# Patient Record
Sex: FEMALE | Race: WHITE | NOT HISPANIC OR LATINO | Employment: OTHER | URBAN - METROPOLITAN AREA
[De-identification: names, ages, dates, MRNs, and addresses within clinical notes are randomized per-mention and may not be internally consistent; named-entity substitution may affect disease eponyms.]

---

## 2017-01-05 ENCOUNTER — TRANSCRIBE ORDERS (OUTPATIENT)
Dept: ADMINISTRATIVE | Facility: HOSPITAL | Age: 82
End: 2017-01-05

## 2017-01-05 DIAGNOSIS — R05.9 COUGH: Primary | ICD-10-CM

## 2017-01-10 ENCOUNTER — HOSPITAL ENCOUNTER (OUTPATIENT)
Dept: CT IMAGING | Facility: HOSPITAL | Age: 82
Discharge: HOME | End: 2017-01-10
Attending: INTERNAL MEDICINE | Admitting: INTERNAL MEDICINE

## 2017-01-10 DIAGNOSIS — R05.9 COUGH: ICD-10-CM

## 2017-01-10 PROCEDURE — 71250 CT THORAX DX C-: CPT

## 2017-03-31 ENCOUNTER — APPOINTMENT (OUTPATIENT)
Dept: WOMENS IMAGING | Facility: HOSPITAL | Age: 82
End: 2017-03-31

## 2017-03-31 PROCEDURE — G0202 SCR MAMMO BI INCL CAD: HCPCS | Performed by: RADIOLOGY

## 2017-03-31 PROCEDURE — 77063 BREAST TOMOSYNTHESIS BI: CPT | Performed by: RADIOLOGY

## 2017-04-20 ENCOUNTER — OFFICE VISIT (OUTPATIENT)
Dept: ONCOLOGY | Facility: CLINIC | Age: 82
End: 2017-04-20
Attending: INTERNAL MEDICINE

## 2017-04-20 ENCOUNTER — LAB (OUTPATIENT)
Dept: LAB | Facility: HOSPITAL | Age: 82
End: 2017-04-20
Attending: INTERNAL MEDICINE

## 2017-04-20 VITALS
HEIGHT: 64 IN | DIASTOLIC BLOOD PRESSURE: 70 MMHG | BODY MASS INDEX: 28.85 KG/M2 | RESPIRATION RATE: 16 BRPM | WEIGHT: 169 LBS | SYSTOLIC BLOOD PRESSURE: 122 MMHG | HEART RATE: 76 BPM | TEMPERATURE: 98.6 F

## 2017-04-20 DIAGNOSIS — C91.10 CLL (CHRONIC LYMPHOCYTIC LEUKEMIA) (HCC): Primary | ICD-10-CM

## 2017-04-20 LAB
BASOPHILS # BLD AUTO: 0.05 10*3/MM3 (ref 0–0.1)
BASOPHILS NFR BLD AUTO: 0.3 % (ref 0–1.1)
DEPRECATED RDW RBC AUTO: 49.7 FL (ref 37–49)
EOSINOPHIL # BLD AUTO: 0.15 10*3/MM3 (ref 0–0.36)
EOSINOPHIL NFR BLD AUTO: 1 % (ref 1–5)
ERYTHROCYTE [DISTWIDTH] IN BLOOD BY AUTOMATED COUNT: 14.3 % (ref 11.7–14.5)
HCT VFR BLD AUTO: 39.5 % (ref 34–45)
HGB BLD-MCNC: 12.6 G/DL (ref 11.5–14.9)
IMM GRANULOCYTES # BLD: 0.07 10*3/MM3 (ref 0–0.03)
IMM GRANULOCYTES NFR BLD: 0.4 % (ref 0–0.5)
LYMPHOCYTES # BLD AUTO: 7.55 10*3/MM3 (ref 1–3.5)
LYMPHOCYTES NFR BLD AUTO: 48.4 % (ref 20–49)
MCH RBC QN AUTO: 30.1 PG (ref 27–33)
MCHC RBC AUTO-ENTMCNC: 31.9 G/DL (ref 32–35)
MCV RBC AUTO: 94.5 FL (ref 83–97)
MONOCYTES # BLD AUTO: 1.34 10*3/MM3 (ref 0.25–0.8)
MONOCYTES NFR BLD AUTO: 8.6 % (ref 4–12)
NEUTROPHILS # BLD AUTO: 6.43 10*3/MM3 (ref 1.5–7)
NEUTROPHILS NFR BLD AUTO: 41.3 % (ref 39–75)
NRBC BLD MANUAL-RTO: 0 /100 WBC (ref 0–0)
PLATELET # BLD AUTO: 224 10*3/MM3 (ref 150–375)
PMV BLD AUTO: 10.4 FL (ref 8.9–12.1)
RBC # BLD AUTO: 4.18 10*6/MM3 (ref 3.9–5)
WBC NRBC COR # BLD: 15.59 10*3/MM3 (ref 4–10)

## 2017-04-20 PROCEDURE — 85025 COMPLETE CBC W/AUTO DIFF WBC: CPT | Performed by: INTERNAL MEDICINE

## 2017-04-20 PROCEDURE — 36415 COLL VENOUS BLD VENIPUNCTURE: CPT | Performed by: INTERNAL MEDICINE

## 2017-04-20 PROCEDURE — 99214 OFFICE O/P EST MOD 30 MIN: CPT | Performed by: INTERNAL MEDICINE

## 2017-04-20 NOTE — PROGRESS NOTES
Subjective .     REASON FOR FOLLOWUP:  New diagnosis of CLL.                             HISTORY OF PRESENT ILLNESS:  The patient is a 83 y.o. year old female  who is here for follow-up with the above-mentioned history.  Denies fever or chills.  Denies unintentional significant weight loss    Denies lymphadenopathy.    Has had 10 or so episodes over the past month of systems sweating only on her chest at night.  These do not sound like drenching night sweats.  But they are new for her.    Past Medical History:   Diagnosis Date   • Asthma    • CKD (chronic kidney disease)    • CLL (chronic lymphocytic leukemia)     Stage 0, trisomy 12   • GERD (gastroesophageal reflux disease)    • H/O Frequent PACs and PVCs    • H/O Lymphocytosis    • H/O Transient amnesia    • HTN (hypertension)    • Hyperlipidemia    • Hypothyroid    • Lumbar stenosis    • Osteopenia    • Pain     H/O diffuse chest, epigastric and lower abdominal pain   • Ulcerative colitis        HEMATOLOGIC/ONCOLOGIC HISTORY:  (History from previous dates can be found in the separate document.)    MEDICATIONS    Current Outpatient Prescriptions:   •  Albuterol (PROVENTIL IN), Inhale As Needed., Disp: , Rfl:   •  amLODIPine (NORVASC) 2.5 MG tablet, Take 2.5 mg by mouth daily., Disp: , Rfl:   •  amLODIPine-valsartan (EXFORGE) 5-320 MG per tablet, Take 0.5 tablets by mouth Daily., Disp: , Rfl:   •  BABY ASPIRIN PO, Take 81 mg by mouth daily., Disp: , Rfl:   •  celecoxib (CELEBREX) 200 MG capsule, Take  by mouth Daily., Disp: , Rfl:   •  Cholecalciferol (VITAMIN D-3 PO), Take 2,000 Units by mouth daily., Disp: , Rfl:   •  dextromethorphan-guaifenesin (ROBITUSSIN-DM)  MG/5ML syrup, Take 5 mL by mouth Every 4 (Four) Hours As Needed., Disp: , Rfl:   •  esomeprazole (NEXIUM) 40 MG capsule, Take  by mouth Daily., Disp: , Rfl:   •  folic acid (FOLVITE) 1 MG tablet, Take 1 mg by mouth daily., Disp: , Rfl:   •  levothyroxine (SYNTHROID, LEVOTHROID) 50 MCG tablet,  Take 50 mcg by mouth daily., Disp: , Rfl:   •  Loratadine 10 MG capsule, Take 10 mg by mouth daily., Disp: , Rfl:   •  meloxicam (MOBIC) 15 MG tablet, Take 15 mg by mouth daily., Disp: , Rfl:   •  metoprolol succinate XL (TOPROL-XL) 25 MG 24 hr tablet, TAKE ONE TABLET BY MOUTH ONE TIME DAILY , Disp: 30 tablet, Rfl: 0  •  Multiple Vitamins-Minerals (MULTI COMPLETE) capsule, Take  by mouth Daily., Disp: , Rfl:   •  omeprazole (PriLOSEC) 20 MG capsule, Take 20 mg by mouth daily., Disp: , Rfl:   •  rosuvastatin (CRESTOR) 20 MG tablet, Take  by mouth Daily., Disp: , Rfl:   •  sulfaSALAzine (AZULFIDINE) 500 MG tablet, Take 500 mg by mouth 4 (four) times a day., Disp: , Rfl:   •  valsartan (DIOVAN) 320 MG tablet, Take 320 mg by mouth daily., Disp: , Rfl:   •  zolpidem (AMBIEN) 5 MG tablet, Take  by mouth., Disp: , Rfl:     ALLERGIES:     Allergies   Allergen Reactions   • Penicillins        SOCIAL HISTORY:       Social History     Social History   • Marital status:      Spouse name: N/A   • Number of children: N/A   • Years of education: N/A     Occupational History   •  Retired     Social History Main Topics   • Smoking status: Former Smoker     Packs/day: 1.50     Years: 40.00     Types: Cigarettes     Quit date: 1978   • Smokeless tobacco: Former User   • Alcohol use 4.2 oz/week     7 Glasses of wine per week      Comment: 1 drink daily   • Drug use: Not on file   • Sexual activity: Not on file     Other Topics Concern   • Not on file     Social History Narrative    housewife         FAMILY HISTORY:  Cancer-related family history is not on file.    REVIEW OF SYSTEMS:  GENERAL: No change in appetite or weight;   No fevers, chills.    SKIN: No nonhealing lesions.   No rashes.  HEME/LYMPH: No easy bruising, bleeding.   No swollen nodes.   EYES: No vision changes or diplopia.   ENT: No tinnitus, hearing loss, gum bleeding, epistaxis, hoarseness or dysphagia.   RESPIRATORY: No cough, shortness of breath, hemoptysis  "or wheezing.   CVS: No chest pain, palpitations, orthopnea, dyspnea on exertion or PND.   GI: No melena or hematochezia.   No abdominal pain.  No nausea, vomiting, constipation, diarrhea  : No lower tract obstructive symptoms, dysuria or hematuria.   MUSCULOSKELETAL: No bone pain.  No joint stiffness.   NEUROLOGICAL: No global weakness, loss of consciousness or seizures.   PSYCHIATRIC: No increased nervousness, mood changes or depression.        Objective    Vitals:    04/20/17 1024   BP: 122/70   Pulse: 76   Resp: 16   Temp: 98.6 °F (37 °C)   Weight: 169 lb (76.7 kg)   Height: 63.78\" (162 cm)  Comment: new ht.   PainSc: 0-No pain     Current Status 4/20/2017   ECOG score 0      PHYSICAL EXAM:    GENERAL:  Well-developed, well-nourished in no acute distress.   SKIN:  Warm, dry without rashes, purpura or petechiae.  HEAD:  Normocephalic.  EYES:  Pupils equal, round and reactive to light.  EOMs intact.  Conjunctivae normal.  EARS:  Hearing intact.  NOSE:  Septum midline.  No excoriations or nasal discharge.  MOUTH:  Tongue is well-papillated; no stomatitis or ulcers.  Lips normal.  THROAT:  Oropharynx without lesions or exudates.  NECK:  Supple with good range of motion; no thyromegaly or masses, no JVD.  LYMPHATICS:  No cervical, supraclavicular, axillary or inguinal adenopathy.  CHEST:  Lungs clear to percussion and auscultation. Good airflow.  CARDIAC:  Regular rate and rhythm without murmurs, rubs or gallops. Normal S1,S2.  ABDOMEN:  Soft, nontender with no organomegaly or masses.  EXTREMITIES:  No clubbing, cyanosis or edema.  NEUROLOGICAL:  Cranial Nerves II-XII grossly intact.  No focal neurological deficits.  PSYCHIATRIC:  Normal affect and mood.      RECENT LABS:        WBC   Date/Time Value Ref Range Status   04/20/2017 10:17 AM 15.59 (H) 4.00 - 10.00 10*3/mm3 Final     Hemoglobin   Date/Time Value Ref Range Status   04/20/2017 10:17 AM 12.6 11.5 - 14.9 g/dL Final     Platelets   Date/Time Value Ref Range " Status   04/20/2017 10:17  150 - 375 10*3/mm3 Final       Assessment/Plan     ASSESSMENT:  CLL (chronic lymphocytic leukemia)  - CBC & Differential      1.  CLL, stage 0, trisomy 12 (prognostic significance not clear).  Has not required any treatment.  White blood count 9 in 2012, 11 2013, 15 in 2014. 13-15 2016.  Numbers remain stable.  No indication to begin treatment.    2.  Lymphocytosis.  Due to CLL.  Stable.  Monitor.    3.  Night sweats occurring 10 times or so over the past month.  Only on her chest.  These do not sound like drenching night sweats.  Numbers are stable.  No lymphadenopathy or hepatosplenomegaly on exam.  She feels good.  No indication to begin treatment.  Maintain same follow-up.  The night sweats were new issue for me to address today.      PLAN:  1.  M.D. CBC 6 months  (I offered annual follow-up but she prefers 6 months)

## 2017-10-20 ENCOUNTER — APPOINTMENT (OUTPATIENT)
Dept: LAB | Facility: HOSPITAL | Age: 82
End: 2017-10-20

## 2017-10-20 ENCOUNTER — APPOINTMENT (OUTPATIENT)
Dept: ONCOLOGY | Facility: CLINIC | Age: 82
End: 2017-10-20

## 2017-11-03 ENCOUNTER — LAB (OUTPATIENT)
Dept: LAB | Facility: HOSPITAL | Age: 82
End: 2017-11-03

## 2017-11-03 ENCOUNTER — OFFICE VISIT (OUTPATIENT)
Dept: ONCOLOGY | Facility: CLINIC | Age: 82
End: 2017-11-03

## 2017-11-03 VITALS
TEMPERATURE: 98.1 F | SYSTOLIC BLOOD PRESSURE: 120 MMHG | DIASTOLIC BLOOD PRESSURE: 70 MMHG | BODY MASS INDEX: 28.17 KG/M2 | WEIGHT: 165 LBS | RESPIRATION RATE: 16 BRPM | HEART RATE: 76 BPM | HEIGHT: 64 IN

## 2017-11-03 DIAGNOSIS — C91.10 CLL (CHRONIC LYMPHOCYTIC LEUKEMIA) (HCC): ICD-10-CM

## 2017-11-03 DIAGNOSIS — C91.10 CLL (CHRONIC LYMPHOCYTIC LEUKEMIA) (HCC): Primary | ICD-10-CM

## 2017-11-03 LAB
BASOPHILS # BLD AUTO: 0.04 10*3/MM3 (ref 0–0.1)
BASOPHILS NFR BLD AUTO: 0.3 % (ref 0–1.1)
DEPRECATED RDW RBC AUTO: 47 FL (ref 37–49)
EOSINOPHIL # BLD AUTO: 0.16 10*3/MM3 (ref 0–0.36)
EOSINOPHIL NFR BLD AUTO: 1 % (ref 1–5)
ERYTHROCYTE [DISTWIDTH] IN BLOOD BY AUTOMATED COUNT: 13.1 % (ref 11.7–14.5)
HCT VFR BLD AUTO: 39.2 % (ref 34–45)
HGB BLD-MCNC: 12.8 G/DL (ref 11.5–14.9)
IMM GRANULOCYTES # BLD: 0.06 10*3/MM3 (ref 0–0.03)
IMM GRANULOCYTES NFR BLD: 0.4 % (ref 0–0.5)
LYMPHOCYTES # BLD AUTO: 8.98 10*3/MM3 (ref 1–3.5)
LYMPHOCYTES NFR BLD AUTO: 56.9 % (ref 20–49)
MCH RBC QN AUTO: 31.4 PG (ref 27–33)
MCHC RBC AUTO-ENTMCNC: 32.7 G/DL (ref 32–35)
MCV RBC AUTO: 96.3 FL (ref 83–97)
MONOCYTES # BLD AUTO: 1.2 10*3/MM3 (ref 0.25–0.8)
MONOCYTES NFR BLD AUTO: 7.6 % (ref 4–12)
NEUTROPHILS # BLD AUTO: 5.35 10*3/MM3 (ref 1.5–7)
NEUTROPHILS NFR BLD AUTO: 33.8 % (ref 39–75)
NRBC BLD MANUAL-RTO: 0 /100 WBC (ref 0–0)
PLATELET # BLD AUTO: 220 10*3/MM3 (ref 150–375)
PMV BLD AUTO: 10.6 FL (ref 8.9–12.1)
RBC # BLD AUTO: 4.07 10*6/MM3 (ref 3.9–5)
WBC NRBC COR # BLD: 15.79 10*3/MM3 (ref 4–10)

## 2017-11-03 PROCEDURE — 99214 OFFICE O/P EST MOD 30 MIN: CPT | Performed by: INTERNAL MEDICINE

## 2017-11-03 PROCEDURE — 85025 COMPLETE CBC W/AUTO DIFF WBC: CPT | Performed by: INTERNAL MEDICINE

## 2017-11-03 PROCEDURE — 36416 COLLJ CAPILLARY BLOOD SPEC: CPT | Performed by: INTERNAL MEDICINE

## 2017-11-03 NOTE — PROGRESS NOTES
Subjective .     REASON FOR FOLLOWUP:  CLL.                             HISTORY OF PRESENT ILLNESS:  The patient is a 83 y.o. year old female  who is here for follow-up with the above-mentioned history.    Denies fevers or chills or weight loss.  Has intermittent night sweats, but these are not drenching.  He was having these most nights in August, but none recently.    No infection since last visit.  She has not found any lymphadenopathy.      Past Medical History:   Diagnosis Date   • Arrhythmia    • Arthritis    • Asthma    • CKD (chronic kidney disease)    • CLL (chronic lymphocytic leukemia)     Stage 0, trisomy 12   • GERD (gastroesophageal reflux disease)    • H/O Frequent PACs and PVCs    • H/O Lymphocytosis    • H/O Transient amnesia    • HTN (hypertension)    • Hyperlipidemia    • Hypothyroid    • Lumbar stenosis    • Neuropathy     Legs   • Osteopenia    • Pain     H/O diffuse chest, epigastric and lower abdominal pain   • Seasonal allergies    • Ulcerative colitis        HEMATOLOGIC/ONCOLOGIC HISTORY:  (History from previous dates can be found in the separate document.)    MEDICATIONS    Current Outpatient Prescriptions:   •  Albuterol (PROVENTIL IN), Inhale As Needed., Disp: , Rfl:   •  amLODIPine (NORVASC) 2.5 MG tablet, Take 2.5 mg by mouth daily., Disp: , Rfl:   •  amLODIPine-valsartan (EXFORGE) 5-320 MG per tablet, Take 0.5 tablets by mouth Daily., Disp: , Rfl:   •  BABY ASPIRIN PO, Take 81 mg by mouth daily., Disp: , Rfl:   •  celecoxib (CELEBREX) 200 MG capsule, Take  by mouth Daily., Disp: , Rfl:   •  Cholecalciferol (VITAMIN D-3 PO), Take 2,000 Units by mouth daily., Disp: , Rfl:   •  dextromethorphan-guaifenesin (ROBITUSSIN-DM)  MG/5ML syrup, Take 5 mL by mouth Every 4 (Four) Hours As Needed., Disp: , Rfl:   •  esomeprazole (NEXIUM) 40 MG capsule, Take  by mouth Daily., Disp: , Rfl:   •  folic acid (FOLVITE) 1 MG tablet, Take 1 mg by mouth daily., Disp: , Rfl:   •  levothyroxine  (SYNTHROID, LEVOTHROID) 50 MCG tablet, Take 50 mcg by mouth daily., Disp: , Rfl:   •  Loratadine 10 MG capsule, Take 10 mg by mouth daily., Disp: , Rfl:   •  meloxicam (MOBIC) 15 MG tablet, Take 15 mg by mouth daily., Disp: , Rfl:   •  metoprolol succinate XL (TOPROL-XL) 25 MG 24 hr tablet, TAKE ONE TABLET BY MOUTH ONE TIME DAILY , Disp: 30 tablet, Rfl: 0  •  Multiple Vitamins-Minerals (MULTI COMPLETE) capsule, Take  by mouth Daily., Disp: , Rfl:   •  omeprazole (PriLOSEC) 20 MG capsule, Take 20 mg by mouth daily., Disp: , Rfl:   •  rosuvastatin (CRESTOR) 20 MG tablet, Take  by mouth Daily., Disp: , Rfl:   •  sulfaSALAzine (AZULFIDINE) 500 MG tablet, Take 500 mg by mouth 4 (four) times a day., Disp: , Rfl:   •  valsartan (DIOVAN) 320 MG tablet, Take 320 mg by mouth daily., Disp: , Rfl:   •  zolpidem (AMBIEN) 5 MG tablet, Take  by mouth., Disp: , Rfl:     ALLERGIES:     Allergies   Allergen Reactions   • Penicillins        SOCIAL HISTORY:       Social History     Social History   • Marital status:      Spouse name: N/A   • Number of children: N/A   • Years of education: N/A     Occupational History   •  Retired     Social History Main Topics   • Smoking status: Former Smoker     Packs/day: 1.50     Years: 40.00     Types: Cigarettes     Quit date: 1978   • Smokeless tobacco: Former User   • Alcohol use 4.2 oz/week     7 Glasses of wine per week      Comment: 1 drink daily   • Drug use: Not on file   • Sexual activity: Not on file     Other Topics Concern   • Not on file     Social History Narrative    housewife         FAMILY HISTORY:  Cancer-related family history includes Colon cancer in her mother.    REVIEW OF SYSTEMS:  GENERAL: No change in appetite or weight;   No fevers, chills.    SKIN: No nonhealing lesions.   No rashes.  HEME/LYMPH: No easy bruising, bleeding.   No swollen nodes.   EYES: No vision changes or diplopia.   ENT: No tinnitus, hearing loss, gum bleeding, epistaxis, hoarseness or  "dysphagia.   RESPIRATORY: No cough, shortness of breath, hemoptysis or wheezing.   CVS: No chest pain, palpitations, orthopnea, dyspnea on exertion or PND.   GI: No melena or hematochezia.   No abdominal pain.  No nausea, vomiting, constipation, diarrhea  : No lower tract obstructive symptoms, dysuria or hematuria.   MUSCULOSKELETAL: No bone pain.  No joint stiffness.   NEUROLOGICAL: No global weakness, loss of consciousness or seizures.   PSYCHIATRIC: No increased nervousness, mood changes or depression.        Objective    Vitals:    11/03/17 0852   BP: 120/70   Pulse: 76   Resp: 16   Temp: 98.1 °F (36.7 °C)   Weight: 165 lb (74.8 kg)   Height: 63.78\" (162 cm)  Comment: new ht.   PainSc: 0-No pain     Current Status 11/3/2017   ECOG score 0      PHYSICAL EXAM:    GENERAL:  Well-developed, well-nourished in no acute distress.   SKIN:  Warm, dry without rashes, purpura or petechiae.  HEAD:  Normocephalic.  EYES:  Pupils equal, round and reactive to light.  EOMs intact.  Conjunctivae normal.  EARS:  Hearing intact.  NOSE:  Septum midline.  No excoriations or nasal discharge.  MOUTH:  Tongue is well-papillated; no stomatitis or ulcers.  Lips normal.  THROAT:  Oropharynx without lesions or exudates.  NECK:  Supple with good range of motion; no thyromegaly or masses, no JVD.  LYMPHATICS:  No cervical, supraclavicular, axillary or inguinal adenopathy.  CHEST:  Lungs clear to percussion and auscultation. Good airflow.  CARDIAC:  Regular rate and rhythm without murmurs, rubs or gallops. Normal S1,S2.  ABDOMEN:  Soft, nontender with no organomegaly or masses.  EXTREMITIES:  No clubbing, cyanosis or edema.  NEUROLOGICAL:  Cranial Nerves II-XII grossly intact.  No focal neurological deficits.  PSYCHIATRIC:  Normal affect and mood.      RECENT LABS:        WBC   Date/Time Value Ref Range Status   11/03/2017 08:45 AM 15.79 (H) 4.00 - 10.00 10*3/mm3 Final     Hemoglobin   Date/Time Value Ref Range Status   11/03/2017 08:45 AM " 12.8 11.5 - 14.9 g/dL Final     Platelets   Date/Time Value Ref Range Status   11/03/2017 08:45  150 - 375 10*3/mm3 Final       Assessment/Plan     ASSESSMENT:  CLL (chronic lymphocytic leukemia)  - CBC & Differential    1.  CLL, stage 0, trisomy 12 (prognostic significance not clear).  Has not required any treatment.  White blood count 9 in 2012, 11 2013, 15 in 2014. 13-15 2016.  Numbers remain stable.  No indication to begin treatment.  WBC stable at 15.8.    2.  Lymphocytosis.  Due to CLL.  Stable.  Monitor.    3.  Non-drenching, intermittent, Night sweats.  This does not appear to be due to CLL since everything else looks stable/asymptomatic.     PLAN:  1.  M.D. CBC 6 months  (I offered annual follow-up but she prefers 6 months)

## 2018-04-17 ENCOUNTER — LAB (OUTPATIENT)
Dept: LAB | Facility: HOSPITAL | Age: 83
End: 2018-04-17

## 2018-04-17 ENCOUNTER — OFFICE VISIT (OUTPATIENT)
Dept: ONCOLOGY | Facility: CLINIC | Age: 83
End: 2018-04-17

## 2018-04-17 VITALS
TEMPERATURE: 98.2 F | BODY MASS INDEX: 28.92 KG/M2 | DIASTOLIC BLOOD PRESSURE: 70 MMHG | SYSTOLIC BLOOD PRESSURE: 120 MMHG | OXYGEN SATURATION: 96 % | HEART RATE: 75 BPM | WEIGHT: 169.4 LBS | RESPIRATION RATE: 12 BRPM | HEIGHT: 64 IN

## 2018-04-17 DIAGNOSIS — C91.10 CLL (CHRONIC LYMPHOCYTIC LEUKEMIA) (HCC): Primary | ICD-10-CM

## 2018-04-17 DIAGNOSIS — C91.10 CLL (CHRONIC LYMPHOCYTIC LEUKEMIA) (HCC): ICD-10-CM

## 2018-04-17 LAB
BASOPHILS # BLD AUTO: 0.05 10*3/MM3 (ref 0–0.1)
BASOPHILS NFR BLD AUTO: 0.2 % (ref 0–1.1)
DEPRECATED RDW RBC AUTO: 44.3 FL (ref 37–49)
EOSINOPHIL # BLD AUTO: 0.09 10*3/MM3 (ref 0–0.36)
EOSINOPHIL NFR BLD AUTO: 0.4 % (ref 1–5)
ERYTHROCYTE [DISTWIDTH] IN BLOOD BY AUTOMATED COUNT: 13 % (ref 11.7–14.5)
HCT VFR BLD AUTO: 38.6 % (ref 34–45)
HGB BLD-MCNC: 12.5 G/DL (ref 11.5–14.9)
IMM GRANULOCYTES # BLD: 0.18 10*3/MM3 (ref 0–0.03)
IMM GRANULOCYTES NFR BLD: 0.8 % (ref 0–0.5)
LYMPHOCYTES # BLD AUTO: 10.95 10*3/MM3 (ref 1–3.5)
LYMPHOCYTES NFR BLD AUTO: 51 % (ref 20–49)
MCH RBC QN AUTO: 30.4 PG (ref 27–33)
MCHC RBC AUTO-ENTMCNC: 32.4 G/DL (ref 32–35)
MCV RBC AUTO: 93.9 FL (ref 83–97)
MONOCYTES # BLD AUTO: 1.92 10*3/MM3 (ref 0.25–0.8)
MONOCYTES NFR BLD AUTO: 8.9 % (ref 4–12)
NEUTROPHILS # BLD AUTO: 8.27 10*3/MM3 (ref 1.5–7)
NEUTROPHILS NFR BLD AUTO: 38.7 % (ref 39–75)
NRBC BLD MANUAL-RTO: 0 /100 WBC (ref 0–0)
PLATELET # BLD AUTO: 249 10*3/MM3 (ref 150–375)
PMV BLD AUTO: 10.5 FL (ref 8.9–12.1)
RBC # BLD AUTO: 4.11 10*6/MM3 (ref 3.9–5)
WBC NRBC COR # BLD: 21.46 10*3/MM3 (ref 4–10)

## 2018-04-17 PROCEDURE — 99213 OFFICE O/P EST LOW 20 MIN: CPT | Performed by: INTERNAL MEDICINE

## 2018-04-17 PROCEDURE — 36416 COLLJ CAPILLARY BLOOD SPEC: CPT | Performed by: INTERNAL MEDICINE

## 2018-04-17 PROCEDURE — 85025 COMPLETE CBC W/AUTO DIFF WBC: CPT | Performed by: INTERNAL MEDICINE

## 2018-04-17 NOTE — PROGRESS NOTES
Subjective .     REASON FOR FOLLOWUP:  CLL.                             HISTORY OF PRESENT ILLNESS:  The patient is a 84 y.o. year old female  who is here for follow-up with the above-mentioned history.    Denies fever or chills.  Denies unintentional significant weight loss  Denies drenching night sweats.     Yesterday, developed swelling of the second digit/knuckles right hand.  Painful.  It difficult to sleep.  Try to see her PCP but was unable to do this so went to urgent care.  Urgent care gave her an antibiotic and set her up to see a hand surgeon tomorrow.    Past Medical History:   Diagnosis Date   • Arrhythmia    • Arthritis    • Asthma    • CKD (chronic kidney disease)    • CLL (chronic lymphocytic leukemia)     Stage 0, trisomy 12   • GERD (gastroesophageal reflux disease)    • H/O Frequent PACs and PVCs    • H/O Lymphocytosis    • H/O Transient amnesia    • HTN (hypertension)    • Hyperlipidemia    • Hypothyroid    • Lumbar stenosis    • Neuropathy     Legs   • Osteopenia    • Pain     H/O diffuse chest, epigastric and lower abdominal pain   • Seasonal allergies    • Ulcerative colitis        HEMATOLOGIC/ONCOLOGIC HISTORY:  (History from previous dates can be found in the separate document.)    MEDICATIONS    Current Outpatient Prescriptions:   •  Albuterol (PROVENTIL IN), Inhale As Needed., Disp: , Rfl:   •  amLODIPine (NORVASC) 2.5 MG tablet, Take 2.5 mg by mouth daily., Disp: , Rfl:   •  amLODIPine-valsartan (EXFORGE) 5-320 MG per tablet, Take 0.5 tablets by mouth Daily., Disp: , Rfl:   •  BABY ASPIRIN PO, Take 81 mg by mouth daily., Disp: , Rfl:   •  celecoxib (CELEBREX) 200 MG capsule, Take  by mouth Daily., Disp: , Rfl:   •  Cholecalciferol (VITAMIN D-3 PO), Take 2,000 Units by mouth daily., Disp: , Rfl:   •  clindamycin (CLEOCIN) 300 MG capsule, Take 1 capsule by mouth 3 (Three) Times a Day., Disp: 30 capsule, Rfl: 0  •  dextromethorphan-guaifenesin (ROBITUSSIN-DM)  MG/5ML syrup, Take 5 mL  by mouth Every 4 (Four) Hours As Needed., Disp: , Rfl:   •  esomeprazole (NEXIUM) 40 MG capsule, Take  by mouth Daily., Disp: , Rfl:   •  folic acid (FOLVITE) 1 MG tablet, Take 1 mg by mouth daily., Disp: , Rfl:   •  levothyroxine (SYNTHROID, LEVOTHROID) 50 MCG tablet, Take 50 mcg by mouth daily., Disp: , Rfl:   •  Loratadine 10 MG capsule, Take 10 mg by mouth daily., Disp: , Rfl:   •  meloxicam (MOBIC) 15 MG tablet, Take 15 mg by mouth daily., Disp: , Rfl:   •  metoprolol succinate XL (TOPROL-XL) 25 MG 24 hr tablet, TAKE ONE TABLET BY MOUTH ONE TIME DAILY , Disp: 30 tablet, Rfl: 0  •  Multiple Vitamins-Minerals (MULTI COMPLETE) capsule, Take  by mouth Daily., Disp: , Rfl:   •  omeprazole (PriLOSEC) 20 MG capsule, Take 20 mg by mouth daily., Disp: , Rfl:   •  rosuvastatin (CRESTOR) 20 MG tablet, Take  by mouth Daily., Disp: , Rfl:   •  sulfaSALAzine (AZULFIDINE) 500 MG tablet, Take 500 mg by mouth 4 (four) times a day., Disp: , Rfl:   •  valsartan (DIOVAN) 320 MG tablet, Take 320 mg by mouth daily., Disp: , Rfl:   •  zolpidem (AMBIEN) 5 MG tablet, Take  by mouth., Disp: , Rfl:     ALLERGIES:     Allergies   Allergen Reactions   • Penicillins Anaphylaxis       SOCIAL HISTORY:       Social History     Social History   • Marital status:      Spouse name: N/A   • Number of children: N/A   • Years of education: N/A     Occupational History   •  Retired     Social History Main Topics   • Smoking status: Former Smoker     Packs/day: 1.50     Years: 40.00     Types: Cigarettes     Quit date: 1978   • Smokeless tobacco: Former User   • Alcohol use 4.2 oz/week     7 Glasses of wine per week      Comment: 1 drink daily   • Drug use: Unknown   • Sexual activity: Not on file     Other Topics Concern   • Not on file     Social History Narrative    housewife         FAMILY HISTORY:  Cancer-related family history includes Colon cancer in her mother.    REVIEW OF SYSTEMS:  Review of Systems   Constitutional: Negative for  "activity change.   HENT: Negative for nosebleeds and trouble swallowing.    Respiratory: Negative for shortness of breath and wheezing.    Cardiovascular: Negative for chest pain and palpitations.   Gastrointestinal: Negative for constipation, diarrhea and nausea.   Genitourinary: Negative for dysuria and hematuria.   Musculoskeletal: Negative for arthralgias and myalgias.   Skin: Negative for rash and wound.   Neurological: Negative for seizures and syncope.   Hematological: Negative for adenopathy. Does not bruise/bleed easily.   Psychiatric/Behavioral: Negative for confusion.           Objective    Vitals:    04/17/18 1337   BP: 120/70   Pulse: 75   Resp: 12   Temp: 98.2 °F (36.8 °C)   SpO2: 96%  Comment: at rest   Weight: 76.8 kg (169 lb 6.4 oz)   Height: 162 cm (63.78\")  Comment: new ht.   PainSc: 5  Comment: rt. hand     Current Status 4/17/2018   ECOG score 1      PHYSICAL EXAM:    CONSTITUTIONAL:  Vital signs reviewed.  No distress, looks comfortable.  EYES:  Conjunctiva and lids unremarkable.  PERRLA  EARS,NOSE,MOUTH,THROAT:  Ears and nose appear unremarkable.  Lips, teeth, gums appear unremarkable.  RESPIRATORY:  Normal respiratory effort.  Lungs clear to auscultation bilaterally.  CARDIOVASCULAR:  Normal S1, S2.  No murmurs rubs or gallops.  No significant lower extremity edema.  GASTROINTESTINAL: Abdomen appears unremarkable.  Nontender.  No hepatomegaly.  No splenomegaly.  LYMPHATIC:  No cervical, supraclavicular, axillary lymphadenopathy.  MUSCULOSKELETAL:  Unremarkable gait and station.  Unremarkable digits/nails.  No cyanosis or clubbing.  SKIN:  Warm.  Right hand second digit warm swollen and red.  PSYCHIATRIC:  Normal judgment and insight.  Normal mood and affect.     RECENT LABS:        WBC   Date/Time Value Ref Range Status   04/17/2018 01:05 PM 21.46 (H) 4.00 - 10.00 10*3/mm3 Final     Hemoglobin   Date/Time Value Ref Range Status   04/17/2018 01:05 PM 12.5 11.5 - 14.9 g/dL Final     Platelets "   Date/Time Value Ref Range Status   04/17/2018 01:05  150 - 375 10*3/mm3 Final       Assessment/Plan     ASSESSMENT:  CLL (chronic lymphocytic leukemia)  - CBC & Differential  - CBC & Differential      *CLL, stage 0, trisomy 12 (prognostic significance not clear).  Has not required any treatment.  WBC 9 in 2012, 11 in 2013.   13--15 [id number].  WBC worse today at 21.5.  ANC not low.  Instead, high.  Suspect the elevation in WBC may be related to her hand swelling.  Hb and PLT remain normal.    *Leukocytosis, due to lymphocytosis.  WBC is worse today.  Also has neutrophilia today.  Suspect this is due to hand issues.    *Lymphocytosis.  Due to CLL.  Stable.  Monitor.    *Right second digit swelling/redness/warmth.  Was seen in urgent care yesterday and given an antibiotic and an appointment was made with a hand surgeon tomorrow.  I told her I suspect this is gout.  She is taking ibuprofen.    *Non-drenching, intermittent, Night sweats.  This does not appear to be due to CLL since everything else looks stable/asymptomatic.     PLAN:  1.  M.D. CBC 6 months  (I have previously offered annual follow-up but she prefers 6 months)

## 2018-07-17 ENCOUNTER — TRANSCRIBE ORDERS (OUTPATIENT)
Dept: ADMINISTRATIVE | Facility: HOSPITAL | Age: 83
End: 2018-07-17

## 2018-07-17 DIAGNOSIS — Z12.31 VISIT FOR SCREENING MAMMOGRAM: Primary | ICD-10-CM

## 2018-07-30 ENCOUNTER — APPOINTMENT (OUTPATIENT)
Dept: MAMMOGRAPHY | Facility: HOSPITAL | Age: 83
End: 2018-07-30
Attending: INTERNAL MEDICINE

## 2018-07-30 ENCOUNTER — APPOINTMENT (OUTPATIENT)
Dept: WOMENS IMAGING | Facility: HOSPITAL | Age: 83
End: 2018-07-30

## 2018-07-30 PROCEDURE — 77063 BREAST TOMOSYNTHESIS BI: CPT | Performed by: RADIOLOGY

## 2018-07-30 PROCEDURE — 77067 SCR MAMMO BI INCL CAD: CPT | Performed by: RADIOLOGY

## 2018-10-01 ENCOUNTER — LAB (OUTPATIENT)
Dept: LAB | Facility: HOSPITAL | Age: 83
End: 2018-10-01

## 2018-10-01 ENCOUNTER — OFFICE VISIT (OUTPATIENT)
Dept: ONCOLOGY | Facility: CLINIC | Age: 83
End: 2018-10-01

## 2018-10-01 VITALS
DIASTOLIC BLOOD PRESSURE: 82 MMHG | WEIGHT: 166.2 LBS | SYSTOLIC BLOOD PRESSURE: 132 MMHG | HEART RATE: 60 BPM | RESPIRATION RATE: 14 BRPM | BODY MASS INDEX: 28.38 KG/M2 | HEIGHT: 64 IN | TEMPERATURE: 98.3 F | OXYGEN SATURATION: 95 %

## 2018-10-01 DIAGNOSIS — C91.10 CLL (CHRONIC LYMPHOCYTIC LEUKEMIA) (HCC): ICD-10-CM

## 2018-10-01 DIAGNOSIS — C91.10 CLL (CHRONIC LYMPHOCYTIC LEUKEMIA) (HCC): Primary | ICD-10-CM

## 2018-10-01 LAB
BASOPHILS # BLD AUTO: 0.06 10*3/MM3 (ref 0–0.1)
BASOPHILS NFR BLD AUTO: 0.3 % (ref 0–1.1)
DEPRECATED RDW RBC AUTO: 44.5 FL (ref 37–49)
EOSINOPHIL # BLD AUTO: 0.19 10*3/MM3 (ref 0–0.36)
EOSINOPHIL NFR BLD AUTO: 1 % (ref 1–5)
ERYTHROCYTE [DISTWIDTH] IN BLOOD BY AUTOMATED COUNT: 12.9 % (ref 11.7–14.5)
HCT VFR BLD AUTO: 41.3 % (ref 34–45)
HGB BLD-MCNC: 13.5 G/DL (ref 11.5–14.9)
IMM GRANULOCYTES # BLD: 0.05 10*3/MM3 (ref 0–0.03)
IMM GRANULOCYTES NFR BLD: 0.3 % (ref 0–0.5)
LYMPHOCYTES # BLD AUTO: 11 10*3/MM3 (ref 1–3.5)
LYMPHOCYTES NFR BLD AUTO: 58.6 % (ref 20–49)
MCH RBC QN AUTO: 30.7 PG (ref 27–33)
MCHC RBC AUTO-ENTMCNC: 32.7 G/DL (ref 32–35)
MCV RBC AUTO: 93.9 FL (ref 83–97)
MONOCYTES # BLD AUTO: 1.52 10*3/MM3 (ref 0.25–0.8)
MONOCYTES NFR BLD AUTO: 8.1 % (ref 4–12)
NEUTROPHILS # BLD AUTO: 5.95 10*3/MM3 (ref 1.5–7)
NEUTROPHILS NFR BLD AUTO: 31.7 % (ref 39–75)
NRBC BLD MANUAL-RTO: 0 /100 WBC (ref 0–0)
PLATELET # BLD AUTO: 229 10*3/MM3 (ref 150–375)
PMV BLD AUTO: 10.7 FL (ref 8.9–12.1)
RBC # BLD AUTO: 4.4 10*6/MM3 (ref 3.9–5)
WBC NRBC COR # BLD: 18.77 10*3/MM3 (ref 4–10)

## 2018-10-01 PROCEDURE — 99214 OFFICE O/P EST MOD 30 MIN: CPT | Performed by: INTERNAL MEDICINE

## 2018-10-01 PROCEDURE — 36415 COLL VENOUS BLD VENIPUNCTURE: CPT | Performed by: INTERNAL MEDICINE

## 2018-10-01 PROCEDURE — 85025 COMPLETE CBC W/AUTO DIFF WBC: CPT | Performed by: INTERNAL MEDICINE

## 2018-10-01 NOTE — PROGRESS NOTES
Subjective .     REASON FOR FOLLOWUP:  CLL.                             HISTORY OF PRESENT ILLNESS:  The patient is a 84 y.o. year old female  who is here for follow-up with the above-mentioned history.    Denies fevers, chills, weight loss, night sweats, recurrent or unusual infections.    Past Medical History:   Diagnosis Date   • Arrhythmia    • Arthritis    • Asthma    • CKD (chronic kidney disease)    • CLL (chronic lymphocytic leukemia) (CMS/HCC)     Stage 0, trisomy 12   • GERD (gastroesophageal reflux disease)    • H/O Frequent PACs and PVCs    • H/O Lymphocytosis    • H/O Transient amnesia    • HTN (hypertension)    • Hyperlipidemia    • Hypothyroid    • Lumbar stenosis    • Neuropathy     Legs   • Osteopenia    • Pain     H/O diffuse chest, epigastric and lower abdominal pain   • Seasonal allergies    • Ulcerative colitis (CMS/HCC)        HEMATOLOGIC/ONCOLOGIC HISTORY:  (History from previous dates can be found in the separate document.)    MEDICATIONS    Current Outpatient Prescriptions:   •  Albuterol (PROVENTIL IN), Inhale As Needed., Disp: , Rfl:   •  amLODIPine (NORVASC) 2.5 MG tablet, Take 2.5 mg by mouth daily., Disp: , Rfl:   •  amLODIPine-valsartan (EXFORGE) 5-320 MG per tablet, Take 0.5 tablets by mouth Daily., Disp: , Rfl:   •  BABY ASPIRIN PO, Take 81 mg by mouth daily., Disp: , Rfl:   •  celecoxib (CELEBREX) 200 MG capsule, Take  by mouth Daily., Disp: , Rfl:   •  Cholecalciferol (VITAMIN D-3 PO), Take 2,000 Units by mouth daily., Disp: , Rfl:   •  clindamycin (CLEOCIN) 300 MG capsule, Take 1 capsule by mouth 3 (Three) Times a Day., Disp: 30 capsule, Rfl: 0  •  dextromethorphan-guaifenesin (ROBITUSSIN-DM)  MG/5ML syrup, Take 5 mL by mouth Every 4 (Four) Hours As Needed., Disp: , Rfl:   •  esomeprazole (NEXIUM) 40 MG capsule, Take  by mouth Daily., Disp: , Rfl:   •  folic acid (FOLVITE) 1 MG tablet, Take 1 mg by mouth daily., Disp: , Rfl:   •  levothyroxine (SYNTHROID, LEVOTHROID) 50  MCG tablet, Take 50 mcg by mouth daily., Disp: , Rfl:   •  Loratadine 10 MG capsule, Take 10 mg by mouth daily., Disp: , Rfl:   •  meloxicam (MOBIC) 15 MG tablet, Take 15 mg by mouth daily., Disp: , Rfl:   •  metoprolol succinate XL (TOPROL-XL) 25 MG 24 hr tablet, TAKE ONE TABLET BY MOUTH ONE TIME DAILY , Disp: 30 tablet, Rfl: 0  •  Multiple Vitamins-Minerals (MULTI COMPLETE) capsule, Take  by mouth Daily., Disp: , Rfl:   •  omeprazole (PriLOSEC) 20 MG capsule, Take 20 mg by mouth daily., Disp: , Rfl:   •  rosuvastatin (CRESTOR) 20 MG tablet, Take  by mouth Daily., Disp: , Rfl:   •  sulfaSALAzine (AZULFIDINE) 500 MG tablet, Take 500 mg by mouth 4 (four) times a day., Disp: , Rfl:   •  valsartan (DIOVAN) 320 MG tablet, Take 320 mg by mouth daily., Disp: , Rfl:   •  zolpidem (AMBIEN) 5 MG tablet, Take  by mouth., Disp: , Rfl:     ALLERGIES:     Allergies   Allergen Reactions   • Penicillins Anaphylaxis       SOCIAL HISTORY:       Social History     Social History   • Marital status:      Spouse name: N/A   • Number of children: N/A   • Years of education: N/A     Occupational History   •  Retired     Social History Main Topics   • Smoking status: Former Smoker     Packs/day: 1.50     Years: 40.00     Types: Cigarettes     Quit date: 1978   • Smokeless tobacco: Former User   • Alcohol use 4.2 oz/week     7 Glasses of wine per week      Comment: 1 drink daily   • Drug use: Unknown   • Sexual activity: Not on file     Other Topics Concern   • Not on file     Social History Narrative    housewife         FAMILY HISTORY:  Cancer-related family history includes Colon cancer in her mother.    REVIEW OF SYSTEMS:  Review of Systems   Constitutional: Negative for activity change.   HENT: Negative for nosebleeds and trouble swallowing.    Respiratory: Negative for shortness of breath and wheezing.    Cardiovascular: Negative for chest pain and palpitations.   Gastrointestinal: Negative for constipation, diarrhea and  "nausea.   Genitourinary: Negative for dysuria and hematuria.   Musculoskeletal: Negative for arthralgias and myalgias.   Skin: Negative for rash and wound.   Neurological: Negative for seizures and syncope.   Hematological: Negative for adenopathy. Does not bruise/bleed easily.   Psychiatric/Behavioral: Negative for confusion.           Objective    Vitals:    10/01/18 1133   BP: 132/82   Pulse: 60   Resp: 14   Temp: 98.3 °F (36.8 °C)   SpO2: 95%  Comment: at rest   Weight: 75.4 kg (166 lb 3.2 oz)   Height: 163 cm (64.17\")  Comment: new ht.   PainSc: 0-No pain     Current Status 10/1/2018   ECOG score 0      PHYSICAL EXAM:    CONSTITUTIONAL:  Vital signs reviewed.  No distress, looks comfortable.  EYES:  Conjunctiva and lids unremarkable.  PERRLA  EARS,NOSE,MOUTH,THROAT:  Ears and nose appear unremarkable.  Lips, teeth, gums appear unremarkable.  RESPIRATORY:  Normal respiratory effort.  Lungs clear to auscultation bilaterally.  CARDIOVASCULAR:  Normal S1, S2.  No murmurs rubs or gallops.  No significant lower extremity edema.  GASTROINTESTINAL: Abdomen appears unremarkable.  Nontender.  No hepatomegaly.  No splenomegaly.  LYMPHATIC:  No cervical, supraclavicular, axillary lymphadenopathy.  SKIN:  Warm.  No rashes.  PSYCHIATRIC:  Normal judgment and insight.  Normal mood and affect.    RECENT LABS:        WBC   Date/Time Value Ref Range Status   10/01/2018 11:18 AM 18.77 (H) 4.00 - 10.00 10*3/mm3 Final     Hemoglobin   Date/Time Value Ref Range Status   10/01/2018 11:18 AM 13.5 11.5 - 14.9 g/dL Final     Platelets   Date/Time Value Ref Range Status   10/01/2018 11:18  150 - 375 10*3/mm3 Final       Assessment/Plan     ASSESSMENT:  CLL (chronic lymphocytic leukemia) (CMS/[facility])  - CBC & Differential      *CLL, stage 0, trisomy 12 (prognostic significance not clear).  Has not required any treatment.  WBC 9 in 2012, 11 in 2013.   13--15 [id number].  WBC relatively stable at 18.8.  Hb and PLT remain " normal.    *Leukocytosis, due to lymphocytosis.    *Lymphocytosis.  Due to CLL.  Stable.  Monitor.    *Non-drenching, intermittent, Night sweats.  This does not appear to be due to CLL since everything else looks stable/asymptomatic.   States these non-drenching night sweats have gotten better recently.    PLAN:  1.  M.D. CBC 6 months  (I have previously offered annual follow-up but she prefers 6 months)

## 2019-03-18 ENCOUNTER — LAB (OUTPATIENT)
Dept: LAB | Facility: HOSPITAL | Age: 84
End: 2019-03-18

## 2019-03-18 ENCOUNTER — OFFICE VISIT (OUTPATIENT)
Dept: ONCOLOGY | Facility: CLINIC | Age: 84
End: 2019-03-18

## 2019-03-18 VITALS
OXYGEN SATURATION: 94 % | DIASTOLIC BLOOD PRESSURE: 66 MMHG | SYSTOLIC BLOOD PRESSURE: 158 MMHG | HEIGHT: 64 IN | HEART RATE: 66 BPM | WEIGHT: 167 LBS | RESPIRATION RATE: 20 BRPM | BODY MASS INDEX: 28.51 KG/M2 | TEMPERATURE: 98.2 F

## 2019-03-18 DIAGNOSIS — C91.10 CLL (CHRONIC LYMPHOCYTIC LEUKEMIA) (HCC): ICD-10-CM

## 2019-03-18 DIAGNOSIS — C91.10 CLL (CHRONIC LYMPHOCYTIC LEUKEMIA) (HCC): Primary | ICD-10-CM

## 2019-03-18 LAB
BASOPHILS # BLD AUTO: 0.05 10*3/MM3 (ref 0–0.2)
BASOPHILS NFR BLD AUTO: 0.3 % (ref 0–1.5)
DEPRECATED RDW RBC AUTO: 47.1 FL (ref 37–54)
EOSINOPHIL # BLD AUTO: 0.15 10*3/MM3 (ref 0–0.4)
EOSINOPHIL NFR BLD AUTO: 0.9 % (ref 0.3–6.2)
ERYTHROCYTE [DISTWIDTH] IN BLOOD BY AUTOMATED COUNT: 13.6 % (ref 12.3–15.4)
HCT VFR BLD AUTO: 39.5 % (ref 34–46.6)
HGB BLD-MCNC: 12.7 G/DL (ref 12–15.9)
IMM GRANULOCYTES # BLD AUTO: 0.07 10*3/MM3 (ref 0–0.05)
IMM GRANULOCYTES NFR BLD AUTO: 0.4 % (ref 0–0.5)
LYMPHOCYTES # BLD AUTO: 8.64 10*3/MM3 (ref 0.7–3.1)
LYMPHOCYTES NFR BLD AUTO: 53.2 % (ref 19.6–45.3)
MCH RBC QN AUTO: 30.4 PG (ref 26.6–33)
MCHC RBC AUTO-ENTMCNC: 32.2 G/DL (ref 31.5–35.7)
MCV RBC AUTO: 94.5 FL (ref 79–97)
MONOCYTES # BLD AUTO: 1.69 10*3/MM3 (ref 0.1–0.9)
MONOCYTES NFR BLD AUTO: 10.4 % (ref 5–12)
NEUTROPHILS # BLD AUTO: 5.64 10*3/MM3 (ref 1.4–7)
NEUTROPHILS NFR BLD AUTO: 34.8 % (ref 42.7–76)
NRBC BLD AUTO-RTO: 0 /100 WBC (ref 0–0)
PLATELET # BLD AUTO: 236 10*3/MM3 (ref 140–450)
PMV BLD AUTO: 10.7 FL (ref 6–12)
RBC # BLD AUTO: 4.18 10*6/MM3 (ref 3.77–5.28)
WBC NRBC COR # BLD: 16.24 10*3/MM3 (ref 3.4–10.8)

## 2019-03-18 PROCEDURE — 99214 OFFICE O/P EST MOD 30 MIN: CPT | Performed by: INTERNAL MEDICINE

## 2019-03-18 PROCEDURE — 85025 COMPLETE CBC W/AUTO DIFF WBC: CPT | Performed by: INTERNAL MEDICINE

## 2019-03-18 PROCEDURE — 36415 COLL VENOUS BLD VENIPUNCTURE: CPT | Performed by: INTERNAL MEDICINE

## 2019-03-18 RX ORDER — AMLODIPINE BESYLATE 5 MG/1
TABLET ORAL
COMMUNITY
End: 2019-12-18 | Stop reason: HOSPADM

## 2019-03-18 NOTE — PROGRESS NOTES
Subjective .     REASON FOR FOLLOWUP:  CLL.                             HISTORY OF PRESENT ILLNESS:  The patient is a 85 y.o. year old female  who is here for follow-up with the above-mentioned history.    No new problems since last visit.  Denies fever, chills, weight loss, night sweats.  No complaints of unusual or recurrent infections.  Past Medical History:   Diagnosis Date   • Arrhythmia    • Arthritis    • Asthma    • CKD (chronic kidney disease)    • CLL (chronic lymphocytic leukemia) (CMS/HCC)     Stage 0, trisomy 12   • GERD (gastroesophageal reflux disease)    • H/O Frequent PACs and PVCs    • H/O Lymphocytosis    • H/O Transient amnesia    • HTN (hypertension)    • Hyperlipidemia    • Hypothyroid    • Lumbar stenosis    • Neuropathy     Legs   • Osteopenia    • Pain     H/O diffuse chest, epigastric and lower abdominal pain   • Seasonal allergies    • Ulcerative colitis (CMS/HCC)        HEMATOLOGIC/ONCOLOGIC HISTORY:  (History from previous dates can be found in the separate document.)    MEDICATIONS    Current Outpatient Medications:   •  Albuterol (PROVENTIL IN), Inhale As Needed., Disp: , Rfl:   •  amLODIPine (NORVASC) 5 MG tablet, Norvasc 5 mg tablet  Take 1 tablet every day by oral route., Disp: , Rfl:   •  BABY ASPIRIN PO, Take 81 mg by mouth daily., Disp: , Rfl:   •  celecoxib (CELEBREX) 200 MG capsule, Take  by mouth Daily., Disp: , Rfl:   •  Cholecalciferol (VITAMIN D-3 PO), Take 2,000 Units by mouth daily., Disp: , Rfl:   •  clindamycin (CLEOCIN) 300 MG capsule, Take 1 capsule by mouth 3 (Three) Times a Day., Disp: 30 capsule, Rfl: 0  •  dextromethorphan-guaifenesin (ROBITUSSIN-DM)  MG/5ML syrup, Take 5 mL by mouth Every 4 (Four) Hours As Needed., Disp: , Rfl:   •  esomeprazole (NEXIUM) 40 MG capsule, Take  by mouth Daily., Disp: , Rfl:   •  folic acid (FOLVITE) 1 MG tablet, Take 1 mg by mouth daily., Disp: , Rfl:   •  levothyroxine (SYNTHROID, LEVOTHROID) 50 MCG tablet, Take 50 mcg by  mouth daily., Disp: , Rfl:   •  Loratadine 10 MG capsule, Take 10 mg by mouth daily., Disp: , Rfl:   •  meloxicam (MOBIC) 15 MG tablet, Take 15 mg by mouth daily., Disp: , Rfl:   •  metoprolol succinate XL (TOPROL-XL) 25 MG 24 hr tablet, TAKE ONE TABLET BY MOUTH ONE TIME DAILY , Disp: 30 tablet, Rfl: 0  •  Multiple Vitamins-Minerals (MULTI COMPLETE) capsule, Take  by mouth Daily., Disp: , Rfl:   •  omeprazole (PriLOSEC) 20 MG capsule, Take 20 mg by mouth daily., Disp: , Rfl:   •  rosuvastatin (CRESTOR) 20 MG tablet, Take  by mouth Daily., Disp: , Rfl:   •  sulfaSALAzine (AZULFIDINE) 500 MG tablet, Take 500 mg by mouth 4 (four) times a day., Disp: , Rfl:   •  valsartan (DIOVAN) 320 MG tablet, Take 320 mg by mouth daily., Disp: , Rfl:   •  zolpidem (AMBIEN) 5 MG tablet, Take  by mouth., Disp: , Rfl:     ALLERGIES:     Allergies   Allergen Reactions   • Penicillins Anaphylaxis       SOCIAL HISTORY:       Social History     Socioeconomic History   • Marital status:      Spouse name: Not on file   • Number of children: Not on file   • Years of education: Not on file   • Highest education level: Not on file   Social Needs   • Financial resource strain: Not on file   • Food insecurity - worry: Not on file   • Food insecurity - inability: Not on file   • Transportation needs - medical: Not on file   • Transportation needs - non-medical: Not on file   Occupational History     Employer: RETIRED   Tobacco Use   • Smoking status: Former Smoker     Packs/day: 1.50     Years: 40.00     Pack years: 60.00     Types: Cigarettes     Last attempt to quit:      Years since quittin.2   • Smokeless tobacco: Former User   Substance and Sexual Activity   • Alcohol use: Yes     Alcohol/week: 4.2 oz     Types: 7 Glasses of wine per week     Comment: 1 drink daily   • Drug use: Not on file   • Sexual activity: Not on file   Other Topics Concern   • Not on file   Social History Narrative    housewife         FAMILY  "HISTORY:  Cancer-related family history includes Colon cancer in her mother.    REVIEW OF SYSTEMS:  Review of Systems   Constitutional: Negative for activity change.   HENT: Negative for nosebleeds and trouble swallowing.    Respiratory: Negative for shortness of breath and wheezing.    Cardiovascular: Negative for chest pain and palpitations.   Gastrointestinal: Negative for constipation, diarrhea and nausea.   Genitourinary: Negative for dysuria and hematuria.   Musculoskeletal: Negative for arthralgias and myalgias.   Skin: Negative for rash and wound.   Neurological: Negative for seizures and syncope.   Hematological: Negative for adenopathy. Does not bruise/bleed easily.   Psychiatric/Behavioral: Negative for confusion.           Objective    Vitals:    03/18/19 1105   BP: 158/66   Pulse: 66   Resp: 20   Temp: 98.2 °F (36.8 °C)   TempSrc: Oral   SpO2: 94%   Weight: 75.8 kg (167 lb)   Height: 163 cm (64.17\")  Comment: new ht needed OCT 2019   PainSc: 0-No pain     Current Status 3/18/2019   ECOG score 1      PHYSICAL EXAM:    CONSTITUTIONAL:  Vital signs reviewed.  No distress, looks comfortable.  EYES:  Conjunctiva and lids unremarkable.  PERRLA  EARS,NOSE,MOUTH,THROAT:  Ears and nose appear unremarkable.  Lips, teeth, gums appear unremarkable.  RESPIRATORY:  Normal respiratory effort.  Lungs clear to auscultation bilaterally.  CARDIOVASCULAR:  Normal S1, S2.  No murmurs rubs or gallops.  No significant lower extremity edema.  GASTROINTESTINAL: Abdomen appears unremarkable.  Nontender.  No hepatomegaly.  No splenomegaly.  LYMPHATIC:  No cervical, supraclavicular, axillary lymphadenopathy.  SKIN:  Warm.  No rashes.  PSYCHIATRIC:  Normal judgment and insight.  Normal mood and affect.    RECENT LABS:        WBC   Date/Time Value Ref Range Status   03/18/2019 10:53 AM 16.24 (H) 3.40 - 10.80 10*3/mm3 Final     Hemoglobin   Date/Time Value Ref Range Status   03/18/2019 10:53 AM 12.7 12.0 - 15.9 g/dL Final "     Platelets   Date/Time Value Ref Range Status   03/18/2019 10:53  140 - 450 10*3/mm3 Final       Assessment/Plan     ASSESSMENT:  CLL (chronic lymphocytic leukemia) (CMS/[facility])  - CBC & Differential      *CLL, stage 0, trisomy 12 (prognostic significance not clear).  Has not required any treatment.  WBC 9 in 2012, 11 in 2013.   13--15 [id number].  WBC relatively stable at 18.8.  Hb and PLT remain normal.    *Leukocytosis, due to lymphocytosis.  WBC 16.2.    *Lymphocytosis.  Due to CLL.  Stable.  Monitor.  ALC 8640    *Non-drenching, intermittent, Night sweats.  This does not appear to be due to CLL since everything else looks stable/asymptomatic.   No current issues with night sweats.    PLAN:  1.  M.D. CBC 6 months  (I have previously offered annual follow-up but she prefers 6 months)

## 2019-07-31 ENCOUNTER — APPOINTMENT (OUTPATIENT)
Dept: WOMENS IMAGING | Facility: HOSPITAL | Age: 84
End: 2019-07-31

## 2019-07-31 PROCEDURE — 77063 BREAST TOMOSYNTHESIS BI: CPT | Performed by: RADIOLOGY

## 2019-07-31 PROCEDURE — 77067 SCR MAMMO BI INCL CAD: CPT | Performed by: RADIOLOGY

## 2019-09-09 ENCOUNTER — LAB (OUTPATIENT)
Dept: LAB | Facility: HOSPITAL | Age: 84
End: 2019-09-09

## 2019-09-09 ENCOUNTER — OFFICE VISIT (OUTPATIENT)
Dept: ONCOLOGY | Facility: CLINIC | Age: 84
End: 2019-09-09

## 2019-09-09 VITALS
OXYGEN SATURATION: 95 % | BODY MASS INDEX: 28.73 KG/M2 | HEART RATE: 60 BPM | DIASTOLIC BLOOD PRESSURE: 66 MMHG | RESPIRATION RATE: 16 BRPM | TEMPERATURE: 98 F | SYSTOLIC BLOOD PRESSURE: 133 MMHG | WEIGHT: 168.3 LBS | HEIGHT: 64 IN

## 2019-09-09 DIAGNOSIS — C91.10 CLL (CHRONIC LYMPHOCYTIC LEUKEMIA) (HCC): Primary | ICD-10-CM

## 2019-09-09 DIAGNOSIS — C91.10 CLL (CHRONIC LYMPHOCYTIC LEUKEMIA) (HCC): ICD-10-CM

## 2019-09-09 LAB
BASOPHILS # BLD AUTO: 0.06 10*3/MM3 (ref 0–0.2)
BASOPHILS NFR BLD AUTO: 0.3 % (ref 0–1.5)
DEPRECATED RDW RBC AUTO: 45.8 FL (ref 37–54)
EOSINOPHIL # BLD AUTO: 0.16 10*3/MM3 (ref 0–0.4)
EOSINOPHIL NFR BLD AUTO: 0.9 % (ref 0.3–6.2)
ERYTHROCYTE [DISTWIDTH] IN BLOOD BY AUTOMATED COUNT: 12.6 % (ref 12.3–15.4)
HCT VFR BLD AUTO: 39.5 % (ref 34–46.6)
HGB BLD-MCNC: 12.5 G/DL (ref 12–15.9)
IMM GRANULOCYTES # BLD AUTO: 0.09 10*3/MM3 (ref 0–0.05)
IMM GRANULOCYTES NFR BLD AUTO: 0.5 % (ref 0–0.5)
LYMPHOCYTES # BLD AUTO: 10.76 10*3/MM3 (ref 0.7–3.1)
LYMPHOCYTES NFR BLD AUTO: 57.7 % (ref 19.6–45.3)
MCH RBC QN AUTO: 31.3 PG (ref 26.6–33)
MCHC RBC AUTO-ENTMCNC: 31.6 G/DL (ref 31.5–35.7)
MCV RBC AUTO: 99 FL (ref 79–97)
MONOCYTES # BLD AUTO: 1.66 10*3/MM3 (ref 0.1–0.9)
MONOCYTES NFR BLD AUTO: 8.9 % (ref 5–12)
NEUTROPHILS # BLD AUTO: 5.93 10*3/MM3 (ref 1.7–7)
NEUTROPHILS NFR BLD AUTO: 31.7 % (ref 42.7–76)
NRBC BLD AUTO-RTO: 0 /100 WBC (ref 0–0.2)
PLATELET # BLD AUTO: 222 10*3/MM3 (ref 140–450)
PMV BLD AUTO: 10.4 FL (ref 6–12)
RBC # BLD AUTO: 3.99 10*6/MM3 (ref 3.77–5.28)
WBC NRBC COR # BLD: 18.66 10*3/MM3 (ref 3.4–10.8)

## 2019-09-09 PROCEDURE — 99214 OFFICE O/P EST MOD 30 MIN: CPT | Performed by: INTERNAL MEDICINE

## 2019-09-09 PROCEDURE — G0463 HOSPITAL OUTPT CLINIC VISIT: HCPCS | Performed by: INTERNAL MEDICINE

## 2019-09-09 PROCEDURE — 85025 COMPLETE CBC W/AUTO DIFF WBC: CPT | Performed by: INTERNAL MEDICINE

## 2019-09-09 PROCEDURE — 36415 COLL VENOUS BLD VENIPUNCTURE: CPT | Performed by: INTERNAL MEDICINE

## 2019-09-09 NOTE — PROGRESS NOTES
Subjective .     REASON FOR FOLLOWUP:  CLL.                             HISTORY OF PRESENT ILLNESS:  The patient is a 85 y.o. year old female  who is here for follow-up with the above-mentioned history.    Doing fine.  No new problems.  Denies fever, chills, weight loss, night sweats, any infection since last visit.    Past Medical History:   Diagnosis Date   • Arrhythmia    • Arthritis    • Asthma    • CKD (chronic kidney disease)    • CLL (chronic lymphocytic leukemia) (CMS/HCC)     Stage 0, trisomy 12   • GERD (gastroesophageal reflux disease)    • H/O Frequent PACs and PVCs    • H/O Lymphocytosis    • H/O Transient amnesia    • HTN (hypertension)    • Hyperlipidemia    • Hypothyroid    • Lumbar stenosis    • Neuropathy     Legs   • Osteopenia    • Pain     H/O diffuse chest, epigastric and lower abdominal pain   • Seasonal allergies    • Ulcerative colitis (CMS/HCC)        HEMATOLOGIC/ONCOLOGIC HISTORY:  (History from previous dates can be found in the separate document.)    MEDICATIONS    Current Outpatient Medications:   •  Albuterol (PROVENTIL IN), Inhale As Needed., Disp: , Rfl:   •  amLODIPine (NORVASC) 5 MG tablet, Norvasc 5 mg tablet  Take 1 tablet every day by oral route., Disp: , Rfl:   •  Cholecalciferol (VITAMIN D-3 PO), Take 2,000 Units by mouth daily., Disp: , Rfl:   •  dextromethorphan-guaifenesin (ROBITUSSIN-DM)  MG/5ML syrup, Take 5 mL by mouth Every 4 (Four) Hours As Needed., Disp: , Rfl:   •  folic acid (FOLVITE) 1 MG tablet, Take 1 mg by mouth daily., Disp: , Rfl:   •  levothyroxine (SYNTHROID, LEVOTHROID) 50 MCG tablet, Take 50 mcg by mouth daily., Disp: , Rfl:   •  Loratadine 10 MG capsule, Take 10 mg by mouth daily., Disp: , Rfl:   •  meloxicam (MOBIC) 15 MG tablet, Take 15 mg by mouth daily., Disp: , Rfl:   •  metoprolol succinate XL (TOPROL-XL) 25 MG 24 hr tablet, TAKE ONE TABLET BY MOUTH ONE TIME DAILY , Disp: 30 tablet, Rfl: 0  •  omeprazole (PriLOSEC) 20 MG capsule, Take 20 mg  by mouth daily., Disp: , Rfl:   •  rosuvastatin (CRESTOR) 20 MG tablet, Take  by mouth Daily., Disp: , Rfl:   •  sulfaSALAzine (AZULFIDINE) 500 MG tablet, Take 500 mg by mouth 4 (four) times a day., Disp: , Rfl:   •  valsartan (DIOVAN) 320 MG tablet, Take 320 mg by mouth daily., Disp: , Rfl:     ALLERGIES:     Allergies   Allergen Reactions   • Penicillins Anaphylaxis       SOCIAL HISTORY:       Social History     Socioeconomic History   • Marital status:      Spouse name: Not on file   • Number of children: Not on file   • Years of education: Not on file   • Highest education level: Not on file   Occupational History     Employer: RETIRED   Tobacco Use   • Smoking status: Former Smoker     Packs/day: 1.50     Years: 40.00     Pack years: 60.00     Types: Cigarettes     Last attempt to quit:      Years since quittin.7   • Smokeless tobacco: Former User   Substance and Sexual Activity   • Alcohol use: Yes     Alcohol/week: 4.2 oz     Types: 7 Glasses of wine per week     Comment: 1 drink daily   Social History Narrative    housewife         FAMILY HISTORY:  Cancer-related family history includes Colon cancer in her mother.    REVIEW OF SYSTEMS:  Review of Systems   Constitutional: Negative for activity change.   HENT: Negative for nosebleeds and trouble swallowing.    Respiratory: Negative for shortness of breath and wheezing.    Cardiovascular: Negative for chest pain and palpitations.   Gastrointestinal: Negative for constipation, diarrhea and nausea.   Genitourinary: Negative for dysuria and hematuria.   Musculoskeletal: Negative for arthralgias and myalgias.   Skin: Negative for rash and wound.   Neurological: Negative for seizures and syncope.   Hematological: Negative for adenopathy. Does not bruise/bleed easily.   Psychiatric/Behavioral: Negative for confusion.           Objective    Vitals:    19 1055   BP: 133/66   Pulse: 60   Resp: 16   Temp: 98 °F (36.7 °C)   SpO2: 95%   Weight: 76.3  "kg (168 lb 4.8 oz)   Height: 163 cm (64.17\")   PainSc: 0-No pain     Current Status 9/9/2019   ECOG score 1      PHYSICAL EXAM:    CONSTITUTIONAL:  Vital signs reviewed.  No distress, looks comfortable.  EYES:  Conjunctiva and lids unremarkable.  PERRLA  EARS,NOSE,MOUTH,THROAT:  Ears and nose appear unremarkable.  Lips, teeth, gums appear unremarkable.  RESPIRATORY:  Normal respiratory effort.  Lungs clear to auscultation bilaterally.  CARDIOVASCULAR:  Normal S1, S2.  No murmurs rubs or gallops.  No significant lower extremity edema.  GASTROINTESTINAL: Abdomen appears unremarkable.  Nontender.  No hepatomegaly.  No splenomegaly.  LYMPHATIC:  No cervical, supraclavicular, axillary lymphadenopathy.  SKIN:  Warm.  No rashes.  PSYCHIATRIC:  Normal judgment and insight.  Normal mood and affect.      RECENT LABS:        WBC   Date/Time Value Ref Range Status   09/09/2019 10:49 AM 18.66 (H) 3.40 - 10.80 10*3/mm3 Final     Hemoglobin   Date/Time Value Ref Range Status   09/09/2019 10:49 AM 12.5 12.0 - 15.9 g/dL Final     Platelets   Date/Time Value Ref Range Status   09/09/2019 10:49  140 - 450 10*3/mm3 Final       Assessment/Plan     ASSESSMENT:  CLL (chronic lymphocytic leukemia) (CMS/[facility])  - CBC & Differential      *CLL, stage 0, trisomy 12 (prognostic significance not clear).  Has not required any treatment.  WBC 9 in 2012, 11 in 2013.   13--15 [id number].  Remains stable with no signs of progression today.  Continue observation.    *Leukocytosis, due to lymphocytosis.  WBC 18.7, stable    *Lymphocytosis.  Due to CLL.  Stable.  Monitor.  ALC 10,760, stable    *Macrocytosis.  Mild.  MCV 99.    *Non-drenching, intermittent, Night sweats.  This does not appear to be due to CLL since everything else looks stable/asymptomatic.   No current issues with night sweats.    PLAN:  1.  M.D. CBC 6 months  (I have previously offered annual follow-up but she prefers 6 months)  "

## 2019-12-16 ENCOUNTER — HOSPITAL ENCOUNTER (OUTPATIENT)
Facility: HOSPITAL | Age: 84
Setting detail: OBSERVATION
Discharge: HOME | End: 2019-12-18
Attending: EMERGENCY MEDICINE | Admitting: INTERNAL MEDICINE

## 2019-12-16 ENCOUNTER — APPOINTMENT (OUTPATIENT)
Dept: GENERAL RADIOLOGY | Facility: HOSPITAL | Age: 84
End: 2019-12-16

## 2019-12-16 ENCOUNTER — APPOINTMENT (OUTPATIENT)
Dept: CT IMAGING | Facility: HOSPITAL | Age: 84
End: 2019-12-16

## 2019-12-16 DIAGNOSIS — R07.81 PLEURITIC CHEST PAIN: Primary | ICD-10-CM

## 2019-12-16 DIAGNOSIS — E03.9 ACQUIRED HYPOTHYROIDISM: ICD-10-CM

## 2019-12-16 LAB
ALBUMIN SERPL-MCNC: 4 G/DL (ref 3.5–5.2)
ALBUMIN/GLOB SERPL: 1.6 G/DL
ALP SERPL-CCNC: 71 U/L (ref 39–117)
ALT SERPL W P-5'-P-CCNC: 15 U/L (ref 1–33)
ANION GAP SERPL CALCULATED.3IONS-SCNC: 10.8 MMOL/L (ref 5–15)
AST SERPL-CCNC: 19 U/L (ref 1–32)
B PARAPERT DNA SPEC QL NAA+PROBE: NOT DETECTED
B PERT DNA SPEC QL NAA+PROBE: NOT DETECTED
BILIRUB SERPL-MCNC: 0.4 MG/DL (ref 0.2–1.2)
BUN BLD-MCNC: 20 MG/DL (ref 8–23)
BUN/CREAT SERPL: 22 (ref 7–25)
C PNEUM DNA NPH QL NAA+NON-PROBE: NOT DETECTED
CALCIUM SPEC-SCNC: 8.9 MG/DL (ref 8.6–10.5)
CHLORIDE SERPL-SCNC: 103 MMOL/L (ref 98–107)
CO2 SERPL-SCNC: 28.2 MMOL/L (ref 22–29)
CREAT BLD-MCNC: 0.91 MG/DL (ref 0.57–1)
CRP SERPL-MCNC: 9.91 MG/DL (ref 0–0.5)
DEPRECATED RDW RBC AUTO: 42.3 FL (ref 37–54)
EOSINOPHIL # BLD MANUAL: 0.2 10*3/MM3 (ref 0–0.4)
EOSINOPHIL NFR BLD MANUAL: 1 % (ref 0.3–6.2)
ERYTHROCYTE [DISTWIDTH] IN BLOOD BY AUTOMATED COUNT: 12.4 % (ref 12.3–15.4)
ERYTHROCYTE [SEDIMENTATION RATE] IN BLOOD: 29 MM/HR (ref 0–30)
FLUAV H1 2009 PAND RNA NPH QL NAA+PROBE: NOT DETECTED
FLUAV H1 HA GENE NPH QL NAA+PROBE: NOT DETECTED
FLUAV H3 RNA NPH QL NAA+PROBE: NOT DETECTED
FLUAV SUBTYP SPEC NAA+PROBE: NOT DETECTED
FLUBV RNA ISLT QL NAA+PROBE: NOT DETECTED
GFR SERPL CREATININE-BSD FRML MDRD: 59 ML/MIN/1.73
GLOBULIN UR ELPH-MCNC: 2.5 GM/DL
GLUCOSE BLD-MCNC: 116 MG/DL (ref 65–99)
HADV DNA SPEC NAA+PROBE: NOT DETECTED
HCOV 229E RNA SPEC QL NAA+PROBE: NOT DETECTED
HCOV HKU1 RNA SPEC QL NAA+PROBE: NOT DETECTED
HCOV NL63 RNA SPEC QL NAA+PROBE: NOT DETECTED
HCOV OC43 RNA SPEC QL NAA+PROBE: NOT DETECTED
HCT VFR BLD AUTO: 34.8 % (ref 34–46.6)
HGB BLD-MCNC: 11.1 G/DL (ref 12–15.9)
HMPV RNA NPH QL NAA+NON-PROBE: NOT DETECTED
HPIV1 RNA SPEC QL NAA+PROBE: NOT DETECTED
HPIV2 RNA SPEC QL NAA+PROBE: NOT DETECTED
HPIV3 RNA NPH QL NAA+PROBE: NOT DETECTED
HPIV4 P GENE NPH QL NAA+PROBE: NOT DETECTED
LYMPHOCYTES # BLD MANUAL: 3.92 10*3/MM3 (ref 0.7–3.1)
LYMPHOCYTES NFR BLD MANUAL: 10 % (ref 5–12)
LYMPHOCYTES NFR BLD MANUAL: 20 % (ref 19.6–45.3)
M PNEUMO IGG SER IA-ACNC: NOT DETECTED
MCH RBC QN AUTO: 29.6 PG (ref 26.6–33)
MCHC RBC AUTO-ENTMCNC: 31.9 G/DL (ref 31.5–35.7)
MCV RBC AUTO: 92.8 FL (ref 79–97)
MONOCYTES # BLD AUTO: 1.96 10*3/MM3 (ref 0.1–0.9)
NEUTROPHILS # BLD AUTO: 13.33 10*3/MM3 (ref 1.7–7)
NEUTROPHILS NFR BLD MANUAL: 68 % (ref 42.7–76)
NT-PROBNP SERPL-MCNC: 2378 PG/ML (ref 5–1800)
PLAT MORPH BLD: NORMAL
PLATELET # BLD AUTO: 254 10*3/MM3 (ref 140–450)
PMV BLD AUTO: 10.7 FL (ref 6–12)
POTASSIUM BLD-SCNC: 4 MMOL/L (ref 3.5–5.2)
PROCALCITONIN SERPL-MCNC: 0.44 NG/ML (ref 0.1–0.25)
PROT SERPL-MCNC: 6.5 G/DL (ref 6–8.5)
RBC # BLD AUTO: 3.75 10*6/MM3 (ref 3.77–5.28)
RBC MORPH BLD: NORMAL
RHINOVIRUS RNA SPEC NAA+PROBE: NOT DETECTED
RSV RNA NPH QL NAA+NON-PROBE: NOT DETECTED
SODIUM BLD-SCNC: 142 MMOL/L (ref 136–145)
TROPONIN T SERPL-MCNC: <0.01 NG/ML (ref 0–0.03)
TROPONIN T SERPL-MCNC: <0.01 NG/ML (ref 0–0.03)
TSH SERPL DL<=0.05 MIU/L-ACNC: 1.47 UIU/ML (ref 0.27–4.2)
VARIANT LYMPHS NFR BLD MANUAL: 1 % (ref 0–5)
WBC MORPH BLD: NORMAL
WBC NRBC COR # BLD: 19.6 10*3/MM3 (ref 3.4–10.8)

## 2019-12-16 PROCEDURE — 84484 ASSAY OF TROPONIN QUANT: CPT | Performed by: PHYSICIAN ASSISTANT

## 2019-12-16 PROCEDURE — 93010 ELECTROCARDIOGRAM REPORT: CPT | Performed by: INTERNAL MEDICINE

## 2019-12-16 PROCEDURE — 83520 IMMUNOASSAY QUANT NOS NONAB: CPT | Performed by: INTERNAL MEDICINE

## 2019-12-16 PROCEDURE — 0 IOPAMIDOL PER 1 ML: Performed by: EMERGENCY MEDICINE

## 2019-12-16 PROCEDURE — 87641 MR-STAPH DNA AMP PROBE: CPT | Performed by: INTERNAL MEDICINE

## 2019-12-16 PROCEDURE — 84145 PROCALCITONIN (PCT): CPT | Performed by: INTERNAL MEDICINE

## 2019-12-16 PROCEDURE — G0378 HOSPITAL OBSERVATION PER HR: HCPCS

## 2019-12-16 PROCEDURE — 86256 FLUORESCENT ANTIBODY TITER: CPT | Performed by: INTERNAL MEDICINE

## 2019-12-16 PROCEDURE — 96375 TX/PRO/DX INJ NEW DRUG ADDON: CPT

## 2019-12-16 PROCEDURE — 86160 COMPLEMENT ANTIGEN: CPT | Performed by: INTERNAL MEDICINE

## 2019-12-16 PROCEDURE — 0100U HC BIOFIRE FILMARRAY RESP PANEL 2: CPT | Performed by: EMERGENCY MEDICINE

## 2019-12-16 PROCEDURE — 94640 AIRWAY INHALATION TREATMENT: CPT

## 2019-12-16 PROCEDURE — 96365 THER/PROPH/DIAG IV INF INIT: CPT

## 2019-12-16 PROCEDURE — 99285 EMERGENCY DEPT VISIT HI MDM: CPT

## 2019-12-16 PROCEDURE — 93005 ELECTROCARDIOGRAM TRACING: CPT | Performed by: EMERGENCY MEDICINE

## 2019-12-16 PROCEDURE — 25010000002 ONDANSETRON PER 1 MG: Performed by: EMERGENCY MEDICINE

## 2019-12-16 PROCEDURE — 71045 X-RAY EXAM CHEST 1 VIEW: CPT

## 2019-12-16 PROCEDURE — 86038 ANTINUCLEAR ANTIBODIES: CPT | Performed by: INTERNAL MEDICINE

## 2019-12-16 PROCEDURE — 86140 C-REACTIVE PROTEIN: CPT | Performed by: INTERNAL MEDICINE

## 2019-12-16 PROCEDURE — 83880 ASSAY OF NATRIURETIC PEPTIDE: CPT | Performed by: INTERNAL MEDICINE

## 2019-12-16 PROCEDURE — 84443 ASSAY THYROID STIM HORMONE: CPT | Performed by: INTERNAL MEDICINE

## 2019-12-16 PROCEDURE — 25010000002 MORPHINE PER 10 MG: Performed by: EMERGENCY MEDICINE

## 2019-12-16 PROCEDURE — 85025 COMPLETE CBC W/AUTO DIFF WBC: CPT | Performed by: PHYSICIAN ASSISTANT

## 2019-12-16 PROCEDURE — 80053 COMPREHEN METABOLIC PANEL: CPT | Performed by: PHYSICIAN ASSISTANT

## 2019-12-16 PROCEDURE — 94799 UNLISTED PULMONARY SVC/PX: CPT

## 2019-12-16 PROCEDURE — 25010000002 LEVOFLOXACIN PER 250 MG: Performed by: INTERNAL MEDICINE

## 2019-12-16 PROCEDURE — 96374 THER/PROPH/DIAG INJ IV PUSH: CPT

## 2019-12-16 PROCEDURE — 85007 BL SMEAR W/DIFF WBC COUNT: CPT | Performed by: PHYSICIAN ASSISTANT

## 2019-12-16 PROCEDURE — 85652 RBC SED RATE AUTOMATED: CPT | Performed by: INTERNAL MEDICINE

## 2019-12-16 PROCEDURE — 86431 RHEUMATOID FACTOR QUANT: CPT | Performed by: INTERNAL MEDICINE

## 2019-12-16 PROCEDURE — 93005 ELECTROCARDIOGRAM TRACING: CPT

## 2019-12-16 PROCEDURE — 87040 BLOOD CULTURE FOR BACTERIA: CPT | Performed by: INTERNAL MEDICINE

## 2019-12-16 PROCEDURE — 25010000002 METHYLPREDNISOLONE PER 40 MG: Performed by: INTERNAL MEDICINE

## 2019-12-16 PROCEDURE — 71275 CT ANGIOGRAPHY CHEST: CPT

## 2019-12-16 RX ORDER — ROSUVASTATIN CALCIUM 20 MG/1
20 TABLET, COATED ORAL NIGHTLY
Status: DISCONTINUED | OUTPATIENT
Start: 2019-12-16 | End: 2019-12-18 | Stop reason: HOSPADM

## 2019-12-16 RX ORDER — METOPROLOL SUCCINATE 50 MG/1
50 TABLET, EXTENDED RELEASE ORAL
Status: DISCONTINUED | OUTPATIENT
Start: 2019-12-16 | End: 2019-12-18 | Stop reason: HOSPADM

## 2019-12-16 RX ORDER — SODIUM CHLORIDE 0.9 % (FLUSH) 0.9 %
10 SYRINGE (ML) INJECTION AS NEEDED
Status: DISCONTINUED | OUTPATIENT
Start: 2019-12-16 | End: 2019-12-18 | Stop reason: HOSPADM

## 2019-12-16 RX ORDER — ALUMINA, MAGNESIA, AND SIMETHICONE 2400; 2400; 240 MG/30ML; MG/30ML; MG/30ML
15 SUSPENSION ORAL ONCE
Status: COMPLETED | OUTPATIENT
Start: 2019-12-16 | End: 2019-12-16

## 2019-12-16 RX ORDER — LEVOTHYROXINE SODIUM 0.07 MG/1
75 TABLET ORAL
Status: DISCONTINUED | OUTPATIENT
Start: 2019-12-17 | End: 2019-12-18 | Stop reason: HOSPADM

## 2019-12-16 RX ORDER — LIDOCAINE HYDROCHLORIDE 20 MG/ML
15 SOLUTION OROPHARYNGEAL ONCE
Status: COMPLETED | OUTPATIENT
Start: 2019-12-16 | End: 2019-12-16

## 2019-12-16 RX ORDER — SODIUM CHLORIDE 0.9 % (FLUSH) 0.9 %
10 SYRINGE (ML) INJECTION EVERY 12 HOURS SCHEDULED
Status: DISCONTINUED | OUTPATIENT
Start: 2019-12-16 | End: 2019-12-18 | Stop reason: HOSPADM

## 2019-12-16 RX ORDER — LEVOFLOXACIN 5 MG/ML
500 INJECTION, SOLUTION INTRAVENOUS EVERY 24 HOURS
Status: COMPLETED | OUTPATIENT
Start: 2019-12-16 | End: 2019-12-17

## 2019-12-16 RX ORDER — ASPIRIN 325 MG
325 TABLET ORAL DAILY
Status: DISCONTINUED | OUTPATIENT
Start: 2019-12-16 | End: 2019-12-18 | Stop reason: HOSPADM

## 2019-12-16 RX ORDER — ONDANSETRON 2 MG/ML
4 INJECTION INTRAMUSCULAR; INTRAVENOUS ONCE
Status: COMPLETED | OUTPATIENT
Start: 2019-12-16 | End: 2019-12-16

## 2019-12-16 RX ORDER — IPRATROPIUM BROMIDE AND ALBUTEROL SULFATE 2.5; .5 MG/3ML; MG/3ML
3 SOLUTION RESPIRATORY (INHALATION)
Status: DISCONTINUED | OUTPATIENT
Start: 2019-12-16 | End: 2019-12-18 | Stop reason: HOSPADM

## 2019-12-16 RX ORDER — MORPHINE SULFATE 2 MG/ML
2 INJECTION, SOLUTION INTRAMUSCULAR; INTRAVENOUS ONCE
Status: COMPLETED | OUTPATIENT
Start: 2019-12-16 | End: 2019-12-16

## 2019-12-16 RX ORDER — NITROGLYCERIN 0.4 MG/1
0.4 TABLET SUBLINGUAL
Status: DISCONTINUED | OUTPATIENT
Start: 2019-12-16 | End: 2019-12-18 | Stop reason: HOSPADM

## 2019-12-16 RX ORDER — SUCRALFATE ORAL 1 G/10ML
1 SUSPENSION ORAL
Status: DISCONTINUED | OUTPATIENT
Start: 2019-12-16 | End: 2019-12-18 | Stop reason: HOSPADM

## 2019-12-16 RX ORDER — PANTOPRAZOLE SODIUM 40 MG/1
40 TABLET, DELAYED RELEASE ORAL
Status: DISCONTINUED | OUTPATIENT
Start: 2019-12-17 | End: 2019-12-18 | Stop reason: HOSPADM

## 2019-12-16 RX ORDER — SULFASALAZINE 500 MG/1
500 TABLET ORAL EVERY 6 HOURS SCHEDULED
Status: DISCONTINUED | OUTPATIENT
Start: 2019-12-16 | End: 2019-12-18 | Stop reason: HOSPADM

## 2019-12-16 RX ORDER — METHYLPREDNISOLONE SODIUM SUCCINATE 40 MG/ML
20 INJECTION, POWDER, LYOPHILIZED, FOR SOLUTION INTRAMUSCULAR; INTRAVENOUS EVERY 8 HOURS
Status: DISCONTINUED | OUTPATIENT
Start: 2019-12-16 | End: 2019-12-16

## 2019-12-16 RX ADMIN — IPRATROPIUM BROMIDE AND ALBUTEROL SULFATE 3 ML: 2.5; .5 SOLUTION RESPIRATORY (INHALATION) at 21:21

## 2019-12-16 RX ADMIN — METOPROLOL SUCCINATE 50 MG: 50 TABLET, FILM COATED, EXTENDED RELEASE ORAL at 17:35

## 2019-12-16 RX ADMIN — SULFASALAZINE 500 MG: 500 TABLET ORAL at 23:27

## 2019-12-16 RX ADMIN — ASPIRIN 325 MG: 325 TABLET ORAL at 17:35

## 2019-12-16 RX ADMIN — SULFASALAZINE 500 MG: 500 TABLET ORAL at 17:34

## 2019-12-16 RX ADMIN — SUCRALFATE 1 G: 1 SUSPENSION ORAL at 17:34

## 2019-12-16 RX ADMIN — SODIUM CHLORIDE, PRESERVATIVE FREE 10 ML: 5 INJECTION INTRAVENOUS at 20:59

## 2019-12-16 RX ADMIN — METHYLPREDNISOLONE SODIUM SUCCINATE 20 MG: 40 INJECTION, POWDER, FOR SOLUTION INTRAMUSCULAR; INTRAVENOUS at 17:35

## 2019-12-16 RX ADMIN — ALUMINUM HYDROXIDE, MAGNESIUM HYDROXIDE, AND DIMETHICONE 15 ML: 400; 400; 40 SUSPENSION ORAL at 10:26

## 2019-12-16 RX ADMIN — ONDANSETRON 4 MG: 2 INJECTION INTRAMUSCULAR; INTRAVENOUS at 08:41

## 2019-12-16 RX ADMIN — MORPHINE SULFATE 2 MG: 2 INJECTION, SOLUTION INTRAMUSCULAR; INTRAVENOUS at 08:41

## 2019-12-16 RX ADMIN — SUCRALFATE 1 G: 1 SUSPENSION ORAL at 20:58

## 2019-12-16 RX ADMIN — LEVOFLOXACIN 500 MG: 5 INJECTION, SOLUTION INTRAVENOUS at 22:22

## 2019-12-16 RX ADMIN — LIDOCAINE HYDROCHLORIDE 15 ML: 20 SOLUTION ORAL; TOPICAL at 10:25

## 2019-12-16 RX ADMIN — ROSUVASTATIN CALCIUM 20 MG: 20 TABLET, FILM COATED ORAL at 20:58

## 2019-12-16 RX ADMIN — IOPAMIDOL 95 ML: 755 INJECTION, SOLUTION INTRAVENOUS at 07:33

## 2019-12-16 NOTE — ED PROVIDER NOTES
"Pt presents to the ED c/o  chest pain onset around 445 this morning.  She initially felt that it may be gas.  She is now complaining of pain that is worse with breathing across her chest and also across her back.  She reports a history of \"irregular heartbeat\".  She does take metoprolol but it does not appear that she takes anticoagulation.     On exam,   Lungs are clear bilaterally, slightly irregular heartbeat with frequent PVCs noted on cardiac monitor.  No murmur.     Plan: Follow-up cardiac biomarkers and CTA chest.     Attestation:  The JUAN and I have discussed this patient's history, physical exam, and treatment plan.  I have reviewed the documentation and personally had a face to face interaction with the patient. I affirm the documentation and agree with the treatment and plan.  The attached note describes my personal findings.          Kojo Cantu MD  12/16/19 0708    "

## 2019-12-16 NOTE — ED TRIAGE NOTES
"Pt to ED from home with c/o CP starting at 0445.  Pt states, \"I thought it was gas because I had a kale soup with 3 different kinds of beans.  I drank a coke to try and burp.\"  Pt reports CP increases lying down or on either side.  Pt also reports SOA.   "

## 2019-12-16 NOTE — H&P
Admission note: Observation stay    Chief complaint: Chest pain.    HPI: 86-year-old white female with history of CLL, UIP, hypothyroidism, ulcerative colitis, GERD was doing well until the middle of the night last night.  She woke up at 3:45 with severe left-sided chest pain.  The pain is worse when she takes a deep breath.  Is also severe if she is supine.  She denies any fever or chills.  She is not bringing up any sputum.  The pain persisted so she came to the emergency room.    In the emergency room her white blood cell count was 19,000, which is her baseline.  Her vital signs were stable.  She underwent CT scan of the chest with pulmonary embolus protocol.  This showed no pulmonary embolus.  Findings of pulmonary fibrosis have mildly progressed since January 2017.  Mediastinal and hilar adenopathy which are stable compared with 2017.  Viral respiratory panel was negative.  EKG was unremarkable and serial troponins were negative.    Patient was in so much pain that she required morphine IV.  She claims the pain was 8/10 in intensity arrival in the emergency room.  Right now on the floor the pain is 3/10.  However she claims the pain is worse when she takes a deep breath.  It involves her left anterior chest and goes to her neck and her jaw.  Times it feels like a pressure sensation.  The pain is severe if she lays back.  She sitting in the chair next to the bed because she cannot lay down.    Patient has a chronic cough associated with her UIP.  She is followed by Dr. Carty, pulmonary.  He saw her early November and gave her his course of prednisone.  While on the steroids her cough got better, but wanted was discontinued her cough returned unabated.  Patient notes over the last 3 to 4 months her breathing has gotten worse.  She gets more short of breath with less activity.  She is not short of breath at rest.  She denies orthopnea or PND.  She denies ankle swelling.  Just exertional dyspnea.        Allergies  Penicillins    MEDICATIONS:  Edarbyclor 40/12.5 1/2 tablet p.o. Daily.  Flonase 1 puff each nostril daily.  Robitussin with codeine 10 mL every 4 hours as needed cough  Levothyroxine 75 mcg p.o. Daily.  Lidoderm patch as needed.  Loratadine 10 mg p.o. Daily.  Prilosec 20 mg p.o. Daily.  Proventil inhaler 2 puffs every 4 hours as needed  Crestor 40 mg p.o. Daily.  Sulfasalazine 500 mg p.o. 4 times daily  Toprol-XL 25 mg p.o. Daily  Triamcinolone cream.  Vitamin D 3 1000 units p.o. Daily.      MEDICAL PROBLEMS  Hypothyroidism    Interstitial lung disease; followed by Dr. Carty, pulmonary.  Patient underwent bronchoscopy in 2017.  Also had high resolution CT scan of the chest at that time.  Patient has not been treated with any remitting agents.  She has received periodic courses of steroids, with the last one about a month ago.    Allergic rhinitis; followed by Dr. Cottrell, allergist.    Osteoarthritis; knee and hip    Atypical nevi; patient sees Dr. Pedroza dermatology annually.    Contractor ectopy; patient has history of multiple PVCs, 17,000 per 24 hours on Holter monitor in October 2012.  Echocardiogram at that time showed no valvular disease and normal LV function.  Patient was seen by Dr. Verde, cardiology, and treated with Toprol.    Lumbar spinal stenosis managed conservatively.    Chronic lymphoid leukemia; followed by Dr. Rodney, [facility].  Patient has not required any treatment.    Asthma    Osteopenia; DEXA scan 2010.    GERD    Hyperlipidemia    Ulcerative colitis; followed by Dr. Echevarria.  Last colonoscopy March 2015.  Treated with sulfasalazine.    Hypertension      SURGERIES:  Eye surgery, to improve ptosis, 2015.  Mohs procedure on the bridge of the nose for basal cell carcinoma March 2015.  Dr. Link.  Bilateral cataract surgery September and October 2010.  Tonsillectomy as a child.      INTERVENTIONS:  Hepatitis A vaccine; September 2018 with the second shot April  2019.  High-dose influenza vaccine October 2018.  Prevnar vaccine March 2015.  Pneumovax January 2012.    FAMILY HISTORY:  Diabetes mellitus/brother.  Hypertension/mother.  Colon cancer/mother at age 78.  Thyroid disease/daughter.  Rheumatoid arthritis/daughter.  Alcoholism/father.  Heart disease/maternal grandfather.  Ulcerative colitis/age 50.    SOCIAL HISTORY:  Patient is  and lives alone.  She has a 30-pack-year smoking quit at 45 years of age.  She drinks 1-2 alcoholic beverages per night.  She is retired seamstress.  Drinks 2 cups of coffee a day.    REVIEW OF SYSTEMS:  General: Patient denies any fever, chills, sweats.  She has been sleeping fair until last night.  Her energy levels been fair.  Her appetite is been good.  Head: Patient had a little bit of sinus drainage, but this is chronic.  No headache.  No sore throat.  Cardiac: Patient complains of left-sided chest pain that is pleuritic in nature and worse when she is supine.  Radiates from her left breast substernal area and up to her jaw and neck.  There is 7/10 intensity on admission is 3/10 currently.  She denies any palpitations.  No ankle swelling, she has not had any orthopnea or PND, but cannot lay flat now because of pain.  Respiratory: Patient has had chronic cough.  She notes she has been getting more dyspnea on exertion of the last 3 months.  She has not been coughing up any sputum but mostly dry cough.  Gastrointestinal: Patient has been able to eat fine.  She has had normal bowel movements.  She rarely has indigestion.  This a.m. she thought it was indigestion and drank some coke, but it did not seem to get better.  She was given a GI cocktail in the emergency room.  This did not seem to help the pain much either.  : No burning with urination.  Musculoskeletal: Patient has not had much in the way of arthritic pain.  Dermatologic: Patient denies rash.  Neurologic: Patient denies headache focal weakness or slurred  "speech.      Psychiatric: Patient denies depression.           /59 (BP Location: Left arm, Patient Position: Sitting)   Pulse 86   Temp 98.4 °F (36.9 °C) (Oral)   Resp 20   Ht 162 cm (63.78\")   Wt 73.7 kg (162 lb 8 oz)   SpO2 93%   BMI 28.09 kg/m²     Physical Exam     General: Elderly white female who is awake and alert and recognizes me.  She is sitting in a chair because she claims she cannot lay back.  He complains of pain that is worse with inspiration the left side of the chest.  Head: No evidence of trauma.  Thinning hair.  Ears: TMs are clear bilaterally.  Nose: Turbinates are little swollen, slight serous drainage.  Mouth: Tongue is normal, pharynx is clear.  Neck: JVD is physiologic.  No lymphadenopathy.  Heart: Distant heart sounds.  Regular rate and rhythm.  I am unable to hear friction rub.  Chest: Palpation at costochondral junction does not reproduce the pain.  Lungs: Clear to auscultation.  Slightly coarse breath sounds.  No wheezes or crackles.  Abdomen: Bowel sounds present, soft, nontender.  Extremities: No edema.  Pedal pulses are palpable.  His knees have full range of motion.  Neurologic exam: Nonfocal.    Lab Results (last 24 hours)     Procedure Component Value Units Date/Time    Respiratory Panel, PCR - Swab, Nasopharynx [[id number]]  (Normal) Collected:  12/16/19 1127    Specimen:  Swab from Nasopharynx Updated:  12/16/19 1323     ADENOVIRUS, PCR Not Detected     Coronavirus 229E Not Detected     Coronavirus HKU1 Not Detected     Coronavirus NL63 Not Detected     Coronavirus OC43 Not Detected     Human Metapneumovirus Not Detected     Human Rhinovirus/Enterovirus Not Detected     Influenza B PCR Not Detected     Parainfluenza Virus 1 Not Detected     Parainfluenza Virus 2 Not Detected     Parainfluenza Virus 3 Not Detected     Parainfluenza Virus 4 Not Detected     Bordetella pertussis pcr Not Detected     Influenza A H1 2009 PCR Not Detected     Chlamydophila pneumoniae PCR Not " Detected     Mycoplasma pneumo by PCR Not Detected     Influenza A PCR Not Detected     Influenza A H3 Not Detected     Influenza A H1 Not Detected     RSV, PCR Not Detected     Bordetella parapertussis PCR Not Detected    Troponin [[id number]]  (Normal) Collected:  12/16/19 0913    Specimen:  Blood Updated:  12/16/19 0949     Troponin T <0.010 ng/mL     Narrative:       Troponin T Reference Range:  <= 0.03 ng/mL-   Negative for AMI  >0.03 ng/mL-     Abnormal for myocardial necrosis.  Clinicians would have to utilize clinical acumen, EKG, Troponin and serial changes to determine if it is an Acute Myocardial Infarction or myocardial injury due to an underlying chronic condition.     CBC & Differential [[id number]] Collected:  12/16/19 0610    Specimen:  Blood Updated:  12/16/19 0706    Narrative:       The following orders were created for panel order CBC & Differential.  Procedure                               Abnormality         Status                     ---------                               -----------         ------                     CBC Auto Differential[[id number]]        Abnormal            Final result                 Please view results for these tests on the individual orders.    CBC Auto Differential [[id number]]  (Abnormal) Collected:  12/16/19 0610    Specimen:  Blood Updated:  12/16/19 0706     WBC 19.60 10*3/mm3      RBC 3.75 10*6/mm3      Hemoglobin 11.1 g/dL      Hematocrit 34.8 %      MCV 92.8 fL      MCH 29.6 pg      MCHC 31.9 g/dL      RDW 12.4 %      RDW-SD 42.3 fl      MPV 10.7 fL      Platelets 254 10*3/mm3     Manual Differential [[id number]]  (Abnormal) Collected:  12/16/19 0610    Specimen:  Blood Updated:  12/16/19 0706     Neutrophil % 68.0 %      Lymphocyte % 20.0 %      Monocyte % 10.0 %      Eosinophil % 1.0 %      Atypical Lymphocyte % 1.0 %      Neutrophils Absolute 13.33 10*3/mm3      Lymphocytes Absolute 3.92 10*3/mm3      Monocytes Absolute 1.96 10*3/mm3      Eosinophils Absolute  0.20 10*3/mm3      RBC Morphology Normal     WBC Morphology Normal     Platelet Morphology Normal    Comprehensive Metabolic Panel [[id number]]  (Abnormal) Collected:  12/16/19 0610    Specimen:  Blood Updated:  12/16/19 0643     Glucose 116 mg/dL      BUN 20 mg/dL      Creatinine 0.91 mg/dL      Sodium 142 mmol/L      Potassium 4.0 mmol/L      Chloride 103 mmol/L      CO2 28.2 mmol/L      Calcium 8.9 mg/dL      Total Protein 6.5 g/dL      Albumin 4.00 g/dL      ALT (SGPT) 15 U/L      AST (SGOT) 19 U/L      Alkaline Phosphatase 71 U/L      Total Bilirubin 0.4 mg/dL      eGFR Non African Amer 59 mL/min/1.73      Globulin 2.5 gm/dL      A/G Ratio 1.6 g/dL      BUN/Creatinine Ratio 22.0     Anion Gap 10.8 mmol/L     Narrative:       GFR Normal >60  Chronic Kidney Disease <60  Kidney Failure <15      Troponin [[id number]]  (Normal) Collected:  12/16/19 0610    Specimen:  Blood Updated:  12/16/19 0643     Troponin T <0.010 ng/mL     Narrative:       Troponin T Reference Range:  <= 0.03 ng/mL-   Negative for AMI  >0.03 ng/mL-     Abnormal for myocardial necrosis.  Clinicians would have to utilize clinical acumen, EKG, Troponin and serial changes to determine if it is an Acute Myocardial Infarction or myocardial injury due to an underlying chronic condition.           Imp:  Left-sided pleuritic chest pain; etiology?  By history seems consistent with pericarditis.  However EKG does not show signs of pericarditis and I am unable to hear a friction rub (although patient's heart sounds are quite diminished).  Could this be an atypical presentation of angina?  Generally interstitial lung disease does not cause chest pain.  This could represent pleurisy.  CT does not mention an aneurysm.  This would be an atypical presentation of GERD I will treat with aspirin.  Also use some Solu-Medrol which will treat pleurisy and pericarditis for now.  I will order an echocardiogram.  Will involve cardiology and pulmonary.  We will also treat  with Protonix and Carafate for now.    Interstitial lung disease; patient's breathing has been getting worse over the last several months.  I will appreciate pulmonary's opinion.    Hypothyroidism; patient was markedly hypothyroid about a month ago because she had been off her thyroid medication while on vacation.  She is back on Synthroid now and I will repeat TSH.    Hypertension; too well controlled.  We will hold the Edarbyclor and continue metoprolol for now.    Ulcerative colitis; will continue sulfasalazine.    DVT prophylaxis; I will use SCDs and stockings for now.        It is of note patient has requested no heroic life-saving measures in the event of cardiopulmonary arrest.      Plan:  Solu-Medrol IV  Aspirin.  Protonix and Carafate.  Check echocardiogram.  Involve pulmonary and cardiology.      Parviz Navarro Jr., MD  12/16/2019  4:12 PM

## 2019-12-16 NOTE — ED PROVIDER NOTES
"EMERGENCY DEPARTMENT ENCOUNTER    Room Number:  19/19  Date seen:  12/16/2019  Time seen: 6:15 AM  PCP: Parviz Navarro Jr., MD  Historian: Pt      HPI:  Chief complaint: Chest pain  Context: Dionne Kaiser is a 86 y.o. female who presents to the ED c/o constant \"sharp\" left sided chest pain that woke her up at 0445 this morning. The pain was initially an 8-9/10 but is now improved. She states it is worse with a deep breath and will become a 4/10. It is also worse laying flat. She states the pain radiates to her back. She reports associated SOA but denies any fever, chills, or abdominal pain. Pt states she was initially laying on her left side. She tried switching positions but continued to have pain. Pt states she also ate kale and beans last night but states this typically doesn't cause her problems. She reports a Hx of acid reflux.         ALLERGIES  Penicillins    PAST MEDICAL HISTORY  Active Ambulatory Problems     Diagnosis Date Noted   • CLL (chronic lymphocytic leukemia) (CMS/[facility]) 04/21/2016     Resolved Ambulatory Problems     Diagnosis Date Noted   • Lymphocytosis 04/21/2016     Past Medical History:   Diagnosis Date   • Arrhythmia    • Arthritis    • Asthma    • CKD (chronic kidney disease)    • GERD (gastroesophageal reflux disease)    • H/O Frequent PACs and PVCs    • H/O Transient amnesia    • HTN (hypertension)    • Hyperlipidemia    • Hypothyroid    • Lumbar stenosis    • Neuropathy    • Osteopenia    • Pain    • Seasonal allergies    • Ulcerative colitis (CMS/[facility])        PAST SURGICAL HISTORY  Past Surgical History:   Procedure Laterality Date   • SKIN CANCER EXCISION  02/2015   • TONSILLECTOMY         FAMILY HISTORY  Family History   Problem Relation Age of Onset   • Colon cancer Mother    • Alcohol abuse Father    • Other Neg Hx         Negative for premature coronary disease       SOCIAL HISTORY  Social History     Socioeconomic History   • Marital status:      Spouse name: Not on file "   • Number of children: Not on file   • Years of education: Not on file   • Highest education level: Not on file   Occupational History     Employer: RETIRED   Tobacco Use   • Smoking status: Former Smoker     Packs/day: 1.50     Years: 40.00     Pack years: 60.00     Types: Cigarettes     Last attempt to quit:      Years since quittin.9   • Smokeless tobacco: Former User   Substance and Sexual Activity   • Alcohol use: Yes     Alcohol/week: 7.0 standard drinks     Types: 7 Glasses of wine per week     Comment: 1 drink daily   Social History Narrative    housewife           REVIEW OF SYSTEMS  Review of Systems   Constitutional: Negative for chills and fever.   HENT: Negative for sore throat.    Eyes: Negative.    Respiratory: Positive for shortness of breath. Negative for cough.    Cardiovascular: Positive for chest pain.   Gastrointestinal: Negative for abdominal pain, diarrhea and vomiting.   Genitourinary: Negative for dysuria.   Musculoskeletal: Negative for neck pain.   Skin: Negative for rash.   Neurological: Negative for weakness, numbness and headaches.   Psychiatric/Behavioral: Negative.    All other systems reviewed and are negative.          PHYSICAL EXAM  ED Triage Vitals   Temp Heart Rate Resp BP SpO2   19 0600 19 0535 19 0536 19 0547 19 0535   99.9 °F (37.7 °C) 54 18 146/86 94 %      Temp src Heart Rate Source Patient Position BP Location FiO2 (%)   -- -- -- -- --            Physical Exam   Constitutional: She is oriented to person, place, and time and well-developed, well-nourished, and in no distress. No distress.   HENT:   Head: Normocephalic and atraumatic.   Mouth/Throat: Mucous membranes are normal.   Eyes: Pupils are equal, round, and reactive to light.   Neck: Normal range of motion. Neck supple.   Cardiovascular: Normal rate, regular rhythm and normal heart sounds.   Pulmonary/Chest: Effort normal and breath sounds normal. No respiratory distress. She has  no wheezes. She has no rales. She exhibits no tenderness.   Abdominal: Soft. There is no tenderness. There is no rebound and no guarding.   Neurological: She is alert and oriented to person, place, and time. She has normal strength.   Skin: Skin is warm, dry and intact.   Psychiatric: Mood, affect and judgment normal.   Nursing note and vitals reviewed.        LAB RESULTS  Recent Results (from the past 24 hour(s))   Troponin    Collection Time: 12/16/19  6:10 AM   Result Value Ref Range    Troponin T <0.010 0.000 - 0.030 ng/mL   Comprehensive Metabolic Panel    Collection Time: 12/16/19  6:10 AM   Result Value Ref Range    Glucose 116 (H) 65 - 99 mg/dL    BUN 20 8 - 23 mg/dL    Creatinine 0.91 0.57 - 1.00 mg/dL    Sodium 142 136 - 145 mmol/L    Potassium 4.0 3.5 - 5.2 mmol/L    Chloride 103 98 - 107 mmol/L    CO2 28.2 22.0 - 29.0 mmol/L    Calcium 8.9 8.6 - 10.5 mg/dL    Total Protein 6.5 6.0 - 8.5 g/dL    Albumin 4.00 3.50 - 5.20 g/dL    ALT (SGPT) 15 1 - 33 U/L    AST (SGOT) 19 1 - 32 U/L    Alkaline Phosphatase 71 39 - 117 U/L    Total Bilirubin 0.4 0.2 - 1.2 mg/dL    eGFR Non African Amer 59 (L) >60 mL/min/1.73    Globulin 2.5 gm/dL    A/G Ratio 1.6 g/dL    BUN/Creatinine Ratio 22.0 7.0 - 25.0    Anion Gap 10.8 5.0 - 15.0 mmol/L   CBC Auto Differential    Collection Time: 12/16/19  6:10 AM   Result Value Ref Range    WBC 19.60 (H) 3.40 - 10.80 10*3/mm3    RBC 3.75 (L) 3.77 - 5.28 10*6/mm3    Hemoglobin 11.1 (L) 12.0 - 15.9 g/dL    Hematocrit 34.8 34.0 - 46.6 %    MCV 92.8 79.0 - 97.0 fL    MCH 29.6 26.6 - 33.0 pg    MCHC 31.9 31.5 - 35.7 g/dL    RDW 12.4 12.3 - 15.4 %    RDW-SD 42.3 37.0 - 54.0 fl    MPV 10.7 6.0 - 12.0 fL    Platelets 254 140 - 450 10*3/mm3   Manual Differential    Collection Time: 12/16/19  6:10 AM   Result Value Ref Range    Neutrophil % 68.0 42.7 - 76.0 %    Lymphocyte % 20.0 19.6 - 45.3 %    Monocyte % 10.0 5.0 - 12.0 %    Eosinophil % 1.0 0.3 - 6.2 %    Atypical Lymphocyte % 1.0 0.0 - 5.0  %    Neutrophils Absolute 13.33 (H) 1.70 - 7.00 10*3/mm3    Lymphocytes Absolute 3.92 (H) 0.70 - 3.10 10*3/mm3    Monocytes Absolute 1.96 (H) 0.10 - 0.90 10*3/mm3    Eosinophils Absolute 0.20 0.00 - 0.40 10*3/mm3    RBC Morphology Normal Normal    WBC Morphology Normal Normal    Platelet Morphology Normal Normal   Troponin    Collection Time: 12/16/19  9:13 AM   Result Value Ref Range    Troponin T <0.010 0.000 - 0.030 ng/mL       I ordered the above labs and reviewed the results        RADIOLOGY  CT Angiogram Chest   Preliminary Result   1.  No findings of pulmonary embolism.   2.  Findings of pulmonary fibrosis which have mildly progressed since   01/10/2017. While the pattern is most suggestive of UIP, current   evaluation is incomplete and a UIP pattern can likely be confirmed with   high-resolution chest CT if clinically indicated.   3.  Mediastinal and hilar adenopathy which is stable since at least   2017.   4.  Other findings as above.       The above findings were discussed with Wilfredo Ortiz by telephone by Silvino Cat at 8:29 AM on 12/16/2019.          XR Chest 1 View   Final Result          I ordered the above noted radiological studies and reviewed the images on the PACS system.          MEDICATIONS GIVEN IN ER  Medications   sodium chloride 0.9 % flush 10 mL (has no administration in time range)   iopamidol (ISOVUE-370) 76 % injection 100 mL (95 mL Intravenous Given 12/16/19 0733)   morphine injection 2 mg (2 mg Intravenous Given 12/16/19 0841)   ondansetron (ZOFRAN) injection 4 mg (4 mg Intravenous Given 12/16/19 0841)   aluminum-magnesium hydroxide-simethicone (MAALOX MAX) 400-400-40 MG/5ML suspension 15 mL (15 mL Oral Given 12/16/19 1026)   Lidocaine Viscous HCl (XYLOCAINE) 2 % mouth solution 15 mL (15 mL Mouth/Throat Given 12/16/19 1025)           EKG  EKG          EKG time: 0540  Rhythm/Rate: NSR, 91 frequent PVCs  P waves and NM: nml  QRS, axis: LAD   ST and T waves: no significant abnormalities  "    Interpreted Contemporaneously by me, independently viewed  There are more PVCs compared to prior 12/1/14        PROCEDURES  Procedures        COURSE & MEDICAL DECISION MAKING  Pertinent Labs and Imaging studies that were ordered and reviewed are noted above.  Results were reviewed/discussed with the patient and they were also made aware of online access.  Pt also made aware that some labs, such as cultures, will not be resulted during ER visit and follow up with PMD is necessary.         PROGRESS AND CONSULTS    Progress Notes:    ED Course as of Dec 16 1149   Mon Dec 16, 2019   0931 18.66 3 months ago   WBC(!): 19.60 [EE]      ED Course User Index  [EE] Wilfredo Ortiz PA           0735 Reviewed pt's history and workup with Dr. Cantu (ER physician).  After a bedside evaluation, Dr. Cantu agrees with the plan of care. Ordered CTA chest for further evaluation.     0829 Per Dr. Cat, Radiology, CTA chest shows no PE. There is chronic pulmonary fibrosis.     0832 Pt recheck. Notified pt of lab work results, including negative first troponin and elevated WBC of 19.6k (elevated at 18.66 three months ago), and negative acute CTA chest. Discussed the plan to perform a repeat troponin. Pt is agreeable.    1003 Pt has continued to have pain. Will try giving a GI cocktail.     1106 Discussed the pt's case and findings with Dr. Navarro, who agrees to admit the pt. He asks that we add a viral panel, which I will.     1113 BP = 112/64; HR = 79; O2 sats = 95%  Rechecked pt. Pt denies any improvement in her Sx after the GI cocktail. Notified pt of negative second troponin. Informed the pt that I have no clear source for the cause of her pain. Discussed the plan to admit the pt for further monitoring, treatment, and evaluation. Pt understands and agrees with the plan, all questions answered.        Disposition vitals:  /76   Pulse 90   Temp 99.9 °F (37.7 °C)   Resp 18   Ht 162.6 cm (64\")   Wt 78 kg (171 lb 15.3 " oz)   SpO2 92%   BMI 29.52 kg/m²         DIAGNOSIS  Final diagnoses:   Pleuritic chest pain         DISPOSITION  ADMISSION    Discussed treatment plan and reason for admission with pt/family and admitting physician.  Pt/family voiced understanding of the plan for admission for further testing/treatment as needed.       Documentation assistance provided by isaac Deng for Wilfredo Ortiz PA-C.  Information recorded by the scribe was done at my direction and has been verified and validated by me.            Piyush Deng  12/16/19 1245       Wilfredo Ortiz PA  12/16/19 2424

## 2019-12-17 ENCOUNTER — APPOINTMENT (OUTPATIENT)
Dept: CARDIOLOGY | Facility: HOSPITAL | Age: 84
End: 2019-12-17

## 2019-12-17 LAB
ANA SER QL: NEGATIVE
ANION GAP SERPL CALCULATED.3IONS-SCNC: 16 MMOL/L (ref 5–15)
AORTIC DIMENSIONLESS INDEX: 0.6 (DI)
BASOPHILS # BLD AUTO: 0.05 10*3/MM3 (ref 0–0.2)
BASOPHILS NFR BLD AUTO: 0.2 % (ref 0–1.5)
BH CV ECHO MEAS - AO MAX PG (FULL): 5.9 MMHG
BH CV ECHO MEAS - AO MAX PG: 8.5 MMHG
BH CV ECHO MEAS - AO MEAN PG (FULL): 4 MMHG
BH CV ECHO MEAS - AO MEAN PG: 5 MMHG
BH CV ECHO MEAS - AO ROOT AREA (BSA CORRECTED): 1.7
BH CV ECHO MEAS - AO ROOT AREA: 7.1 CM^2
BH CV ECHO MEAS - AO ROOT DIAM: 3 CM
BH CV ECHO MEAS - AO V2 MAX: 146 CM/SEC
BH CV ECHO MEAS - AO V2 MEAN: 106 CM/SEC
BH CV ECHO MEAS - AO V2 VTI: 32.2 CM
BH CV ECHO MEAS - ASC AORTA: 2.8 CM
BH CV ECHO MEAS - AVA(I,A): 1.9 CM^2
BH CV ECHO MEAS - AVA(I,D): 1.9 CM^2
BH CV ECHO MEAS - AVA(V,A): 1.9 CM^2
BH CV ECHO MEAS - AVA(V,D): 1.9 CM^2
BH CV ECHO MEAS - BSA(HAYCOCK): 1.9 M^2
BH CV ECHO MEAS - BSA: 1.8 M^2
BH CV ECHO MEAS - BZI_BMI: 29.8 KILOGRAMS/M^2
BH CV ECHO MEAS - BZI_METRIC_HEIGHT: 160 CM
BH CV ECHO MEAS - BZI_METRIC_WEIGHT: 76.2 KG
BH CV ECHO MEAS - EDV(CUBED): 132.7 ML
BH CV ECHO MEAS - EDV(MOD-SP2): 69 ML
BH CV ECHO MEAS - EDV(MOD-SP4): 67 ML
BH CV ECHO MEAS - EDV(TEICH): 123.8 ML
BH CV ECHO MEAS - EF(CUBED): 92 %
BH CV ECHO MEAS - EF(MOD-BP): 59 %
BH CV ECHO MEAS - EF(MOD-SP2): 66.7 %
BH CV ECHO MEAS - EF(MOD-SP4): 53.7 %
BH CV ECHO MEAS - EF(TEICH): 86.9 %
BH CV ECHO MEAS - ESV(CUBED): 10.6 ML
BH CV ECHO MEAS - ESV(MOD-SP2): 23 ML
BH CV ECHO MEAS - ESV(MOD-SP4): 31 ML
BH CV ECHO MEAS - ESV(TEICH): 16.2 ML
BH CV ECHO MEAS - FS: 56.9 %
BH CV ECHO MEAS - IVS/LVPW: 1.3
BH CV ECHO MEAS - IVSD: 1 CM
BH CV ECHO MEAS - LAT PEAK E' VEL: 5.7 CM/SEC
BH CV ECHO MEAS - LV DIASTOLIC VOL/BSA (35-75): 37.3 ML/M^2
BH CV ECHO MEAS - LV MASS(C)D: 163.6 GRAMS
BH CV ECHO MEAS - LV MASS(C)DI: 91.1 GRAMS/M^2
BH CV ECHO MEAS - LV MAX PG: 2.6 MMHG
BH CV ECHO MEAS - LV MEAN PG: 1 MMHG
BH CV ECHO MEAS - LV SYSTOLIC VOL/BSA (12-30): 17.3 ML/M^2
BH CV ECHO MEAS - LV V1 MAX: 80.5 CM/SEC
BH CV ECHO MEAS - LV V1 MEAN: 56.4 CM/SEC
BH CV ECHO MEAS - LV V1 VTI: 18 CM
BH CV ECHO MEAS - LVIDD: 5.1 CM
BH CV ECHO MEAS - LVIDS: 2.2 CM
BH CV ECHO MEAS - LVLD AP2: 6.9 CM
BH CV ECHO MEAS - LVLD AP4: 6.9 CM
BH CV ECHO MEAS - LVLS AP2: 5.6 CM
BH CV ECHO MEAS - LVLS AP4: 6 CM
BH CV ECHO MEAS - LVOT AREA (M): 3.5 CM^2
BH CV ECHO MEAS - LVOT AREA: 3.5 CM^2
BH CV ECHO MEAS - LVOT DIAM: 2.1 CM
BH CV ECHO MEAS - LVPWD: 0.8 CM
BH CV ECHO MEAS - MED PEAK E' VEL: 5.1 CM/SEC
BH CV ECHO MEAS - MV A MAX VEL: 78.1 CM/SEC
BH CV ECHO MEAS - MV DEC SLOPE: 317.5 CM/SEC^2
BH CV ECHO MEAS - MV DEC TIME: 0.23 SEC
BH CV ECHO MEAS - MV E MAX VEL: 62.6 CM/SEC
BH CV ECHO MEAS - MV E/A: 0.8
BH CV ECHO MEAS - MV MAX PG: 5 MMHG
BH CV ECHO MEAS - MV MEAN PG: 3 MMHG
BH CV ECHO MEAS - MV P1/2T MAX VEL: 109 CM/SEC
BH CV ECHO MEAS - MV P1/2T: 100.6 MSEC
BH CV ECHO MEAS - MV V2 MAX: 112 CM/SEC
BH CV ECHO MEAS - MV V2 MEAN: 83.5 CM/SEC
BH CV ECHO MEAS - MV V2 VTI: 28.4 CM
BH CV ECHO MEAS - MVA P1/2T LCG: 2 CM^2
BH CV ECHO MEAS - MVA(P1/2T): 2.2 CM^2
BH CV ECHO MEAS - MVA(VTI): 2.2 CM^2
BH CV ECHO MEAS - RAP SYSTOLE: 8 MMHG
BH CV ECHO MEAS - SI(AO): 126.8 ML/M^2
BH CV ECHO MEAS - SI(CUBED): 67.9 ML/M^2
BH CV ECHO MEAS - SI(LVOT): 34.7 ML/M^2
BH CV ECHO MEAS - SI(MOD-SP2): 25.6 ML/M^2
BH CV ECHO MEAS - SI(MOD-SP4): 20 ML/M^2
BH CV ECHO MEAS - SI(TEICH): 59.9 ML/M^2
BH CV ECHO MEAS - SV(AO): 227.6 ML
BH CV ECHO MEAS - SV(CUBED): 122 ML
BH CV ECHO MEAS - SV(LVOT): 62.3 ML
BH CV ECHO MEAS - SV(MOD-SP2): 46 ML
BH CV ECHO MEAS - SV(MOD-SP4): 36 ML
BH CV ECHO MEAS - SV(TEICH): 107.6 ML
BH CV ECHO MEAS - TAPSE (>1.6): 2 CM2
BH CV ECHO MEASUREMENTS AVERAGE E/E' RATIO: 11.59
BH CV XLRA - RV BASE: 3.3 CM
BH CV XLRA - TDI S': 11 CM/SEC
BUN BLD-MCNC: 26 MG/DL (ref 8–23)
BUN/CREAT SERPL: 24.5 (ref 7–25)
C3 SERPL-MCNC: 156 MG/DL (ref 82–167)
C4 SERPL-MCNC: 52 MG/DL (ref 14–44)
CALCIUM SPEC-SCNC: 9.3 MG/DL (ref 8.6–10.5)
CHLORIDE SERPL-SCNC: 97 MMOL/L (ref 98–107)
CO2 SERPL-SCNC: 26 MMOL/L (ref 22–29)
CREAT BLD-MCNC: 1.06 MG/DL (ref 0.57–1)
DEPRECATED RDW RBC AUTO: 40.5 FL (ref 37–54)
EOSINOPHIL # BLD AUTO: 0 10*3/MM3 (ref 0–0.4)
EOSINOPHIL NFR BLD AUTO: 0 % (ref 0.3–6.2)
ERYTHROCYTE [DISTWIDTH] IN BLOOD BY AUTOMATED COUNT: 12.1 % (ref 12.3–15.4)
GFR SERPL CREATININE-BSD FRML MDRD: 49 ML/MIN/1.73
GLUCOSE BLD-MCNC: 129 MG/DL (ref 65–99)
HCT VFR BLD AUTO: 33.5 % (ref 34–46.6)
HGB BLD-MCNC: 11.5 G/DL (ref 12–15.9)
IMM GRANULOCYTES # BLD AUTO: 0.27 10*3/MM3 (ref 0–0.05)
IMM GRANULOCYTES NFR BLD AUTO: 1.2 % (ref 0–0.5)
LEFT ATRIUM VOLUME INDEX: 2 ML/M2
LYMPHOCYTES # BLD AUTO: 2.09 10*3/MM3 (ref 0.7–3.1)
LYMPHOCYTES NFR BLD AUTO: 9.4 % (ref 19.6–45.3)
MCH RBC QN AUTO: 31.7 PG (ref 26.6–33)
MCHC RBC AUTO-ENTMCNC: 34.3 G/DL (ref 31.5–35.7)
MCV RBC AUTO: 92.3 FL (ref 79–97)
MONOCYTES # BLD AUTO: 3.42 10*3/MM3 (ref 0.1–0.9)
MONOCYTES NFR BLD AUTO: 15.4 % (ref 5–12)
MRSA DNA SPEC QL NAA+PROBE: NORMAL
NEUTROPHILS # BLD AUTO: 16.39 10*3/MM3 (ref 1.7–7)
NEUTROPHILS NFR BLD AUTO: 73.8 % (ref 42.7–76)
NRBC BLD AUTO-RTO: 0 /100 WBC (ref 0–0.2)
PLATELET # BLD AUTO: 235 10*3/MM3 (ref 140–450)
PMV BLD AUTO: 10.9 FL (ref 6–12)
POTASSIUM BLD-SCNC: 4.2 MMOL/L (ref 3.5–5.2)
RA LATEX TURBID: 11.7 IU/ML (ref 0–13.9)
RBC # BLD AUTO: 3.63 10*6/MM3 (ref 3.77–5.28)
SODIUM BLD-SCNC: 139 MMOL/L (ref 136–145)
TROPONIN T SERPL-MCNC: <0.01 NG/ML (ref 0–0.03)
WBC NRBC COR # BLD: 22.22 10*3/MM3 (ref 3.4–10.8)

## 2019-12-17 PROCEDURE — 85025 COMPLETE CBC W/AUTO DIFF WBC: CPT | Performed by: INTERNAL MEDICINE

## 2019-12-17 PROCEDURE — 94799 UNLISTED PULMONARY SVC/PX: CPT

## 2019-12-17 PROCEDURE — 25010000002 LEVOFLOXACIN PER 250 MG: Performed by: INTERNAL MEDICINE

## 2019-12-17 PROCEDURE — G0378 HOSPITAL OBSERVATION PER HR: HCPCS

## 2019-12-17 PROCEDURE — 84484 ASSAY OF TROPONIN QUANT: CPT | Performed by: INTERNAL MEDICINE

## 2019-12-17 PROCEDURE — 93306 TTE W/DOPPLER COMPLETE: CPT

## 2019-12-17 PROCEDURE — 93005 ELECTROCARDIOGRAM TRACING: CPT | Performed by: INTERNAL MEDICINE

## 2019-12-17 PROCEDURE — 97161 PT EVAL LOW COMPLEX 20 MIN: CPT

## 2019-12-17 PROCEDURE — 93010 ELECTROCARDIOGRAM REPORT: CPT | Performed by: INTERNAL MEDICINE

## 2019-12-17 PROCEDURE — 93306 TTE W/DOPPLER COMPLETE: CPT | Performed by: INTERNAL MEDICINE

## 2019-12-17 PROCEDURE — 99204 OFFICE O/P NEW MOD 45 MIN: CPT | Performed by: INTERNAL MEDICINE

## 2019-12-17 PROCEDURE — 80048 BASIC METABOLIC PNL TOTAL CA: CPT | Performed by: INTERNAL MEDICINE

## 2019-12-17 RX ORDER — LEVOFLOXACIN 500 MG/1
500 TABLET, FILM COATED ORAL EVERY 24 HOURS
Status: DISCONTINUED | OUTPATIENT
Start: 2019-12-18 | End: 2019-12-18 | Stop reason: HOSPADM

## 2019-12-17 RX ADMIN — LEVOTHYROXINE SODIUM 75 MCG: 75 TABLET ORAL at 05:37

## 2019-12-17 RX ADMIN — SULFASALAZINE 500 MG: 500 TABLET ORAL at 11:36

## 2019-12-17 RX ADMIN — SUCRALFATE 1 G: 1 SUSPENSION ORAL at 20:59

## 2019-12-17 RX ADMIN — PANTOPRAZOLE SODIUM 40 MG: 40 TABLET, DELAYED RELEASE ORAL at 05:37

## 2019-12-17 RX ADMIN — IPRATROPIUM BROMIDE AND ALBUTEROL SULFATE 3 ML: 2.5; .5 SOLUTION RESPIRATORY (INHALATION) at 08:17

## 2019-12-17 RX ADMIN — SULFASALAZINE 500 MG: 500 TABLET ORAL at 05:37

## 2019-12-17 RX ADMIN — SUCRALFATE 1 G: 1 SUSPENSION ORAL at 11:36

## 2019-12-17 RX ADMIN — SODIUM CHLORIDE, PRESERVATIVE FREE 10 ML: 5 INJECTION INTRAVENOUS at 20:59

## 2019-12-17 RX ADMIN — METOPROLOL SUCCINATE 50 MG: 50 TABLET, FILM COATED, EXTENDED RELEASE ORAL at 11:36

## 2019-12-17 RX ADMIN — LEVOFLOXACIN 500 MG: 5 INJECTION, SOLUTION INTRAVENOUS at 21:56

## 2019-12-17 RX ADMIN — IPRATROPIUM BROMIDE AND ALBUTEROL SULFATE 3 ML: 2.5; .5 SOLUTION RESPIRATORY (INHALATION) at 20:27

## 2019-12-17 RX ADMIN — SUCRALFATE 1 G: 1 SUSPENSION ORAL at 17:49

## 2019-12-17 RX ADMIN — ROSUVASTATIN CALCIUM 20 MG: 20 TABLET, FILM COATED ORAL at 20:59

## 2019-12-17 RX ADMIN — ASPIRIN 325 MG: 325 TABLET ORAL at 11:36

## 2019-12-17 RX ADMIN — SUCRALFATE 1 G: 1 SUSPENSION ORAL at 05:37

## 2019-12-17 RX ADMIN — IPRATROPIUM BROMIDE AND ALBUTEROL SULFATE 3 ML: 2.5; .5 SOLUTION RESPIRATORY (INHALATION) at 16:47

## 2019-12-17 RX ADMIN — SODIUM CHLORIDE, PRESERVATIVE FREE 10 ML: 5 INJECTION INTRAVENOUS at 11:37

## 2019-12-17 RX ADMIN — SULFASALAZINE 500 MG: 500 TABLET ORAL at 17:49

## 2019-12-17 RX ADMIN — IPRATROPIUM BROMIDE AND ALBUTEROL SULFATE 3 ML: 2.5; .5 SOLUTION RESPIRATORY (INHALATION) at 12:21

## 2019-12-17 NOTE — PROGRESS NOTES
History:  Patient sitting up in a chair beside the bed this morning.  Her chest pain went away last night.  She was able to sleep supine.  She claims she is not short of breath at the present moment.    Allergies  Penicillins      Current Facility-Administered Medications:   •  aspirin tablet 325 mg, 325 mg, Oral, Daily, Parviz Navarro Jr., MD, 325 mg at 12/16/19 1735  •  ipratropium-albuterol (DUO-NEB) nebulizer solution 3 mL, 3 mL, Nebulization, 4x Daily - RT, Parviz Navarro Jr., MD, 3 mL at 12/16/19 2121  •  levoFLOXacin (LEVAQUIN) 500 mg/100 mL D5W (premix) (LEVAQUIN) 500 mg, 500 mg, Intravenous, Q24H, Rajiv Moore MD, Stopped at 12/16/19 2340  •  levothyroxine (SYNTHROID, LEVOTHROID) tablet 75 mcg, 75 mcg, Oral, Q AM, Parviz Navarro Jr., MD, 75 mcg at 12/17/19 0537  •  metoprolol succinate XL (TOPROL-XL) 24 hr tablet 50 mg, 50 mg, Oral, Q24H, Parviz Navarro Jr., MD, 50 mg at 12/16/19 1735  •  nitroglycerin (NITROSTAT) SL tablet 0.4 mg, 0.4 mg, Sublingual, Q5 Min PRN, Parviz Navarro Jr., MD  •  pantoprazole (PROTONIX) EC tablet 40 mg, 40 mg, Oral, Q AM, Parviz Navarro Jr., MD, 40 mg at 12/17/19 0537  •  rosuvastatin (CRESTOR) tablet 20 mg, 20 mg, Oral, Nightly, Parviz Navarro Jr., MD, 20 mg at 12/16/19 2058  •  [COMPLETED] Insert peripheral IV, , , Once **AND** sodium chloride 0.9 % flush 10 mL, 10 mL, Intravenous, PRN, Parviz Navarro Jr., MD  •  sodium chloride 0.9 % flush 10 mL, 10 mL, Intravenous, Q12H, Parviz Navarro Jr., MD, 10 mL at 12/16/19 2059  •  sodium chloride 0.9 % flush 10 mL, 10 mL, Intravenous, PRN, Parviz Navarro Jr., MD  •  sucralfate (CARAFATE) 1 GM/10ML suspension 1 g, 1 g, Oral, 4x Daily AC & at Bedtime, Parviz Navarro Jr., MD, 1 g at 12/17/19 0537  •  sulfaSALAzine (AZULFIDINE) tablet 500 mg, 500 mg, Oral, Q6H, Parviz Navarro Jr., MD, 500 mg at 12/17/19 0537    /57 (BP Location: Left arm, Patient Position: Lying)   Pulse 90   " Temp 97.7 °F (36.5 °C) (Oral)   Resp 18   Ht 162 cm (63.78\")   Wt 76.4 kg (168 lb 8 oz)   SpO2 92%   BMI 29.12 kg/m²     Physical Exam  General: Elderly white female who is awake and alert and recognizes me.  She is sitting in a chair in no distress  Head: No evidence of trauma.  Thinning hair.  Mouth: Tongue is normal, pharynx is clear.  Neck: JVD is physiologic.  No lymphadenopathy.  Heart: Distant heart sounds.  Regular rate and rhythm.  I am unable to hear friction rub.  Lungs: Coarse breath sounds in the bases.  Abdomen: Bowel sounds present, soft, nontender.  Extremities: No edema.  Pedal pulses are palpable.  His knees have full range of motion.  Neurologic exam: Nonfocal.  Lab Results (last 24 hours)     Procedure Component Value Units Date/Time    MRSA Screen, PCR - Swab, Nares [[id number]]  (Normal) Collected:  12/16/19 2223    Specimen:  Swab from Nares Updated:  12/17/19 0057     MRSA PCR No MRSA Detected    Blood Culture - Blood, Blood, Venous Line [[id number]] Collected:  12/16/19 2143    Specimen:  Blood, Venous Line Updated:  12/16/19 2143    Blood Culture - Blood, Blood, Venous Line [[id number]] Collected:  12/16/19 2142    Specimen:  Blood, Venous Line Updated:  12/16/19 2142    Sedimentation Rate [[id number]]  (Normal) Collected:  12/16/19 1631    Specimen:  Blood Updated:  12/16/19 1837     Sed Rate 29 mm/hr     Procalcitonin [[id number]]  (Abnormal) Collected:  12/16/19 1631    Specimen:  Blood Updated:  12/16/19 1732     Procalcitonin 0.44 ng/mL     Narrative:       As a Marker for Sepsis (Non-Neonates):   1. <0.5 ng/mL represents a low risk of severe sepsis and/or septic shock.  1. >2 ng/mL represents a high risk of severe sepsis and/or septic shock.    As a Marker for Lower Respiratory Tract Infections that require antibiotic therapy:  PCT on Admission     Antibiotic Therapy             6-12 Hrs later  > 0.5                Strongly Recommended            >0.25 - <0.5         " "Recommended  0.1 - 0.25           Discouraged                   Remeasure/reassess PCT  <0.1                 Strongly Discouraged          Remeasure/reassess PCT      As 28 day mortality risk marker: \"Change in Procalcitonin Result\" (> 80 % or <=80 %) if Day 0 (or Day 1) and Day 4 values are available. Refer to http://[email or web address]-pct-calculator.com/   Change in PCT <=80 %   A decrease of PCT levels below or equal to 80 % defines a positive change in PCT test result representing a higher risk for 28-day all-cause mortality of patients diagnosed with severe sepsis or septic shock.  Change in PCT > 80 %   A decrease of PCT levels of more than 80 % defines a negative change in PCT result representing a lower risk for 28-day all-cause mortality of patients diagnosed with severe sepsis or septic shock.                  TSH [[id number]]  (Normal) Collected:  12/16/19 1631    Specimen:  Blood Updated:  12/16/19 1732     TSH 1.470 uIU/mL     BNP [[id number]]  (Abnormal) Collected:  12/16/19 1631    Specimen:  Blood Updated:  12/16/19 1732     proBNP 2,378.0 pg/mL     Narrative:       Among patients with dyspnea, NT-proBNP is highly sensitive for the detection of acute congestive heart failure. In addition NT-proBNP of <300 pg/ml effectively rules out acute congestive heart failure with 99% negative predictive value.      C-reactive Protein [[id number]]  (Abnormal) Collected:  12/16/19 1631    Specimen:  Blood Updated:  12/16/19 1722     C-Reactive Protein 9.91 mg/dL     C3+C4+MAYUR+RA [[id number]] Collected:  12/16/19 1631    Specimen:  Blood Updated:  12/16/19 1635    ANCA Panel [[id number]] Collected:  12/16/19 1631    Specimen:  Blood Updated:  12/16/19 1635    Respiratory Panel, PCR - Swab, Nasopharynx [[id number]]  (Normal) Collected:  12/16/19 1127    Specimen:  Swab from Nasopharynx Updated:  12/16/19 1323     ADENOVIRUS, PCR Not Detected     Coronavirus 229E Not Detected     Coronavirus HKU1 Not Detected     Coronavirus NL63 " Not Detected     Coronavirus OC43 Not Detected     Human Metapneumovirus Not Detected     Human Rhinovirus/Enterovirus Not Detected     Influenza B PCR Not Detected     Parainfluenza Virus 1 Not Detected     Parainfluenza Virus 2 Not Detected     Parainfluenza Virus 3 Not Detected     Parainfluenza Virus 4 Not Detected     Bordetella pertussis pcr Not Detected     Influenza A H1 2009 PCR Not Detected     Chlamydophila pneumoniae PCR Not Detected     Mycoplasma pneumo by PCR Not Detected     Influenza A PCR Not Detected     Influenza A H3 Not Detected     Influenza A H1 Not Detected     RSV, PCR Not Detected     Bordetella parapertussis PCR Not Detected    Troponin [[id number]]  (Normal) Collected:  12/16/19 0913    Specimen:  Blood Updated:  12/16/19 0949     Troponin T <0.010 ng/mL     Narrative:       Troponin T Reference Range:  <= 0.03 ng/mL-   Negative for AMI  >0.03 ng/mL-     Abnormal for myocardial necrosis.  Clinicians would have to utilize clinical acumen, EKG, Troponin and serial changes to determine if it is an Acute Myocardial Infarction or myocardial injury due to an underlying chronic condition.           Imp:  Pleuritic chest pain; appears to have subsided.  Appreciate Dr. Moore's expertise.  Patient currently on Levaquin.  Echocardiogram and cardiology consult pending.    COPD and exacerbation    Interstitial lung disease    GERD; currently on Protonix and Carafate.    CLL; on no treatment.    Hypertension; controlled without the Edarbi chlor at this point.    Elevated inflammatory markers.    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      Plan:  Continue current treatment.  Await echocardiogram and cardiology opinion.    Parviz Navarro Jr., MD  12/17/2019  7:14 AM

## 2019-12-17 NOTE — PROGRESS NOTES
Discharge Planning Assessment  [facility]     Patient Name: Dionne Kaiser  MRN: [id number]  Today's Date: 12/17/2019    Admit Date: 12/16/2019    Discharge Needs Assessment     Row Name 12/17/19 1424       Living Environment    Lives With  alone    Current Living Arrangements  home/apartment/condo    Primary Care Provided by  self    Provides Primary Care For  no one    Family Caregiver if Needed  child(amaris), adult    Family Caregiver Names  Umu Srinivasan [facility] [phone]    Quality of Family Relationships  supportive    Able to Return to Prior Arrangements  yes       Resource/Environmental Concerns    Resource/Environmental Concerns  none    Transportation Concerns  car, none       Transition Planning    Patient/Family Anticipates Transition to  home    Patient/Family Anticipated Services at Transition  none    Transportation Anticipated  family or friend will provide       Discharge Needs Assessment    Readmission Within the Last 30 Days  no previous admission in last 30 days    Concerns to be Addressed  no discharge needs identified    Equipment Currently Used at Home  rollator    Anticipated Changes Related to Illness  none    Current Discharge Risk  lives alone        Discharge Plan     Row Name 12/17/19 1432       Plan    Plan  Home denies needs.     Patient/Family in Agreement with Plan  yes    Plan Comments  ESPINO 12/16/2019.  Met with patient at bedside, face sheet verified. Prior to admission patient was living in her own home and able to perform all of her own ADL's.  She does use a rollator when in public and she still drives.  Patient uses the [facility] on [address], denies issues affording or taking her medications. Explained meds to beds, she is agreeable and understands she is responsible for any co-pays. Umu Craig [phone] is her health care surrogate.  Discharge goal is to return home.  After review and discussion, discharge plan to return home seems appropriate. Family or friend should be able to  transport. Will continue to monitor for new or changing discharge needs.        Destination      Coordination has not been started for this encounter.      Durable Medical Equipment      Coordination has not been started for this encounter.      Dialysis/Infusion      Coordination has not been started for this encounter.      Home Medical Care      Coordination has not been started for this encounter.      Therapy      Coordination has not been started for this encounter.      Community Resources      Coordination has not been started for this encounter.          Demographic Summary     Row Name 12/17/19 1423       General Information    Admission Type  observation    Arrived From  emergency department    Required Notices Provided  Observation Status Notice    Referral Source  admission list    Reason for Consult  discharge planning    Preferred Language  English     Used During This Interaction  no       Contact Information    Permission Granted to Share Info With  family/designee Umu Craig [facility] [phone]        Functional Status     Row Name 12/17/19 1423       Functional Status    Usual Activity Tolerance  good    Current Activity Tolerance  moderate       Functional Status, IADL    Medications  independent    Meal Preparation  independent    Housekeeping  independent    Laundry  independent    Shopping  independent       Mental Status    General Appearance WDL  WDL       Mental Status Summary    Recent Changes in Mental Status/Cognitive Functioning  no changes        Psychosocial    No documentation.       Abuse/Neglect    No documentation.       Legal    No documentation.       Substance Abuse    No documentation.       Patient Forms    No documentation.           Stacy Rush RN

## 2019-12-17 NOTE — PLAN OF CARE
Problem: Patient Care Overview  Goal: Plan of Care Review  Outcome: Ongoing (interventions implemented as appropriate)  Flowsheets (Taken 12/17/2019 0504)  Progress: improving  Plan of Care Reviewed With: patient  Outcome Summary: Admitted yesterday for pleuritic CP. Patient denies any current CP. Pulm and Cardio consulted. Increased PPI dose and started on carafate, IV steroids ordered for possible pleurisy or pericarditis. Possible COPD exac? Blood cultures and IV antibiotics ordered per pulmonary. O2 stable on 2L NC. Ambulating in room with walker and minimal assist. VSS, will ct to closely monitor.

## 2019-12-17 NOTE — THERAPY DISCHARGE NOTE
Patient Name: Dionne Kaiser  : 1933    MRN: [id number]                              Today's Date: 2019       Admit Date: 2019    Visit Dx:     ICD-10-CM ICD-9-CM   1. Pleuritic chest pain R07.81 786.52     Patient Active Problem List   Diagnosis   • CLL (chronic lymphocytic leukemia) (CMS/HCC)   • Pleuritic chest pain     Past Medical History:   Diagnosis Date   • Arrhythmia    • Arthritis    • Asthma    • CKD (chronic kidney disease)    • CLL (chronic lymphocytic leukemia) (CMS/HCC)     Stage 0, trisomy 12   • GERD (gastroesophageal reflux disease)    • H/O Frequent PACs and PVCs    • H/O Lymphocytosis    • H/O Transient amnesia    • HTN (hypertension)    • Hyperlipidemia    • Hypothyroid    • Lumbar stenosis    • Neuropathy     Legs   • Osteopenia    • Pain     H/O diffuse chest, epigastric and lower abdominal pain   • Seasonal allergies    • Ulcerative colitis (CMS/HCC)      Past Surgical History:   Procedure Laterality Date   • SKIN CANCER EXCISION  2015   • TONSILLECTOMY       General Information     Row Name 19 09          PT Evaluation Time/Intention    Document Type  evaluation;discharge treatment  -AR     Mode of Treatment  physical therapy  -AR     Row Name 19 09          General Information    Patient Profile Reviewed?  yes  -AR     Prior Level of Function  independent: uses rollator, no h/o falls,  Ind w/ all ADLs/ groceries/errands.   -AR     Existing Precautions/Restrictions  no known precautions/restrictions up ad al in room, discussed w/ RN  -AR     Barriers to Rehab  none identified  -AR     Row Name 19          Relationship/Environment    Lives With  alone  -AR     Row Name 19 0919          Resource/Environmental Concerns    Current Living Arrangements  home/apartment/condo  -AR     Row Name 19 0919          Home Main Entrance    Number of Stairs, Main Entrance  -- flight of steps to enter home, no issues or concerns  -AR     Row Name  12/17/19 0919          Cognitive Assessment/Intervention- PT/OT    Orientation Status (Cognition)  oriented x 4  -AR       User Key  (r) = Recorded By, (t) = Taken By, (c) = Cosigned By    Initials Name Provider Type    AR Genevieve Abdalla, PT Physical Therapist        Mobility     Row Name 12/17/19 0920          Sit-Stand Transfer    Sit-Stand [place] (Transfers)  conditional independence  -AR     Assistive Device (Sit-Stand Transfers)  -- pts  3 wheeled walker  -AR     Row Name 12/17/19 0920          Gait/Stairs Assessment/Training    Gait/Stairs Assessment/Training  gait/ambulation independence  -AR     [place] Level (Gait)  conditional independence  -AR     Assistive Device (Gait)  -- pts 3 wheeled walker  -AR     Distance in Feet (Gait)  200 w/ 1 standing break to check Sp02   -AR     Pattern (Gait)  swing-through  -AR     Comment (Gait/Stairs)  no LOB or safety concerns.  Pt reports at baseline.   -AR       User Key  (r) = Recorded By, (t) = Taken By, (c) = Cosigned By    Initials Name Provider Type    AR Genevieve Abdalla, PT Physical Therapist        Obj/Interventions     Row Name 12/17/19 0921          General ROM    GENERAL ROM COMMENTS  B LE WFL  -AR     Row Name 12/17/19 0921          MMT (Manual Muscle Testing)    General MMT Comments  B LE WFL  -AR     Row Name 12/17/19 0921          Static Sitting Balance    Level of [place] (Unsupported Sitting, Static Balance)  independent  -AR     Row Name 12/17/19 0921          Dynamic Sitting Balance    Level of [place], Reaches Outside Midline (Sitting, Dynamic Balance)  independent  -AR     Row Name 12/17/19 0921          Static Standing Balance    Level of [place] (Supported Standing, Static Balance)  conditional independence  -AR     Assistive Device Utilized (Supported Standing, Static Balance)  -- pts walker  -AR     Row Name 12/17/19 0921          Dynamic Standing Balance    Level of [place], Reaches Outside Midline (Standing,  Dynamic Balance)  conditional independence  -AR     Assistive Device Utilized (Supported Standing, Dynamic Balance)  -- pts walker  -AR       User Key  (r) = Recorded By, (t) = Taken By, (c) = Cosigned By    Initials Name Provider Type    AR Genevieve Abdalla, PT Physical Therapist        Goals/Plan    No documentation.       Clinical Impression     Row Name 12/17/19 0921          Pain Assessment    Additional Documentation  Pain Scale: Numbers Pre/Post-Treatment (Group)  -AR     Row Name 12/17/19 0921          Pain Scale: Numbers Pre/Post-Treatment    Pain Scale: Numbers, Pretreatment  0/10 - no pain  -AR     Pain Scale: Numbers, Post-Treatment  0/10 - no pain  -AR     Pain Intervention(s)  Repositioned  -AR     Row Name 12/17/19 0921          Plan of Care Review    Plan of Care Reviewed With  patient  -AR     Outcome Summary  Pt admitted 12/16 w/ pleuritic CP.  Pt reports independence living at home alone, no history of falls, keeps 3 wheeled walker in car, never uses in home.  Pt demonstrates independence w/ all transfers and gait.  Pt denies concerns for home.  Educated on activity/walking recom and PT POC to DC PT -no need for inpatient PT, pt in agreement.  RN updated on pt performance.   -AR     Row Name 12/17/19 0921          Physical Therapy Clinical Impression    Patient/Family Goals Statement (PT Clinical Impression)  DC  home  -AR     Criteria for Skilled Interventions Met (PT Clinical Impression)  no;no problems identified which require skilled intervention  -AR     Row Name 12/17/19 0921          Vital Signs    Pre SpO2 (%)  92  -AR     O2 Delivery Pre Treatment  supplemental O2  -AR     Intra SpO2 (%)  87  -AR     O2 Delivery Intra Treatment  room air  -AR     Post SpO2 (%)  91  -AR     O2 Delivery Post Treatment  room air  -AR     Row Name 12/17/19 0921          Positioning and Restraints    Pre-Treatment Position  sitting in chair/recliner  -AR     Post Treatment Position  chair  -AR       User Key   (r) = Recorded By, (t) = Taken By, (c) = Cosigned By    Initials Name Provider Type    Genevieve Stokes PT Physical Therapist        Outcome Measures     Row Name 12/17/19 0923          How much help from another person do you currently need...    Turning from your back to your side while in flat bed without using bedrails?  4  -AR     Moving from lying on back to sitting on the side of a flat bed without bedrails?  4  -AR     Moving to and from a bed to a chair (including a wheelchair)?  4  -AR     Standing up from a chair using your arms (e.g., wheelchair, bedside chair)?  4  -AR     Climbing 3-5 steps with a railing?  4  -AR     To walk in hospital room?  4  -AR     AM-PAC 6 Clicks Score (PT)  24  -AR     Row Name 12/17/19 0923          Functional Assessment    Outcome Measure Options  AM-PAC 6 Clicks Basic Mobility (PT)  -AR       User Key  (r) = Recorded By, (t) = Taken By, (c) = Cosigned By    Initials Name Provider Type    Genevieve Stokes PT Physical Therapist        Physical Therapy Education                 Title: PT OT SLP Therapies (Done)     Topic: Physical Therapy (Done)     Point: Mobility training (Done)     Description:   Instruct learner(s) on safety and technique for assisting patient out of bed, chair or wheelchair.  Instruct in the proper use of assistive devices, such as walker, crutches, cane or brace.              Patient Friendly Description:   It's important to get you on your feet again, but we need to do so in a way that is safe for you. Falling has serious consequences, and your personal safety is the most important thing of all.        When it's time to get out of bed, one of us or a family member will sit next to you on the bed to give you support.     If your doctor or nurse tells you to use a walker, crutches, a cane, or a brace, be sure you use it every time you get out of bed, even if you think you don't need it.    Learning Progress Summary           Patient Acceptance, E,  VU,DU by AR at 12/17/2019 0923                   Point: Home exercise program (Done)     Description:   Instruct learner(s) on appropriate technique for monitoring, assisting and/or progressing patient with therapeutic exercises and activities.              Learning Progress Summary           Patient Acceptance, E, VU,DU by AR at 12/17/2019 0923                   Point: Body mechanics (Done)     Description:   Instruct learner(s) on proper positioning and spine alignment for patient and/or caregiver during mobility tasks and/or exercises.              Learning Progress Summary           Patient Acceptance, E, VU,DU by AR at 12/17/2019 0923                   Point: Precautions (Done)     Description:   Instruct learner(s) on prescribed precautions during mobility and gait tasks              Learning Progress Summary           Patient Acceptance, E, VU,DU by AR at 12/17/2019 0923                               User Key     Initials Effective Dates Name Provider Type Discipline    AR 04/03/18 -  Genevieve Abdalla, PT Physical Therapist PT              PT Recommendation and Plan     Outcome Summary/Treatment Plan (PT)  Anticipated Discharge Disposition (PT): home  Plan of Care Reviewed With: patient  Outcome Summary: Pt admitted 12/16 w/ pleuritic CP.  Pt reports independence living at home alone, no history of falls, keeps 3 wheeled walker in car, never uses in home.  Pt demonstrates independence w/ all transfers and gait.  Pt denies concerns for home.  Educated on activity/walking recom and PT POC to DC PT -no need for inpatient PT, pt in agreement.  RN updated on pt performance.      Time Calculation:   PT Charges     Row Name 12/17/19 0918             Time Calculation    Start Time  0901  -AR      Stop Time  0919  -AR      Time Calculation (min)  18 min  -AR      PT Received On  12/17/19  -AR        User Key  (r) = Recorded By, (t) = Taken By, (c) = Cosigned By    Initials Name Provider Type    Genevieve Stokes, PT  Physical Therapist        Therapy Charges for Today     Code Description Service Date Service Provider Modifiers Qty    09207406808 HC PT EVAL LOW COMPLEXITY 3 12/17/2019 Genevieve Abdalla, PT GP 1          PT G-Codes  Outcome Measure Options: AM-PAC 6 Clicks Basic Mobility (PT)  AM-PAC 6 Clicks Score (PT): 24    PT Discharge Summary  Anticipated Discharge Disposition (PT): home    Genevieve Abdalla PT  12/17/2019

## 2019-12-17 NOTE — PLAN OF CARE
Problem: Patient Care Overview  Goal: Plan of Care Review  Flowsheets (Taken 12/17/2019 0921)  Outcome Summary: Pt admitted 12/16 w/ pleuritic CP.  Pt reports independence living at home alone, no history of falls, keeps 3 wheeled walker in car, never uses in home.  Pt demonstrates independence w/ all transfers and gait.  Pt denies concerns for home.  Educated on activity/walking recom and PT POC to DC PT -no need for inpatient PT, pt in agreement.  RN updated on pt performance.

## 2019-12-17 NOTE — PROGRESS NOTES
[facility]  [phone]  Rajiv Moore MD    Subjective:  LOS: 0    She looks substantially better and is sitting in a chair.  Denies any complaints chest pain gone.  Cough is improved.  No nausea vomiting diarrhea.    Objective   Vital Signs past 24hrs    Temp range: Temp (24hrs), Av °F (36.7 °C), Min:97.7 °F (36.5 °C), Max:98.4 °F (36.9 °C)    BP range: BP: ()/(53-92) 102/53  Pulse range: Heart Rate:  [65-95] 95  Resp rate range: Resp:  [16-20] 18    Device (Oxygen Therapy): room airFlow (L/min):  [1.5-2] 1.5  Oxygen range:SpO2:  [92 %-95 %] 95 %      76.4 kg (168 lb 8 oz); Body mass index is 29.12 kg/m².    Intake/Output Summary (Last 24 hours) at 2019 1635  Last data filed at 2019 1335  Gross per 24 hour   Intake 806 ml   Output 1050 ml   Net -244 ml       Physical Exam   Constitutional: She appears well-developed.   HENT:   Head: Normocephalic.   Eyes: Pupils are equal, round, and reactive to light.   Cardiovascular: Normal rate and regular rhythm.   No murmur heard.  Pulmonary/Chest: Effort normal. No respiratory distress. She has no wheezes. She has rales (few) in the right lower field and the left lower field.   Abdominal: Soft. Bowel sounds are normal. She exhibits no mass. There is no tenderness.   Musculoskeletal: She exhibits no edema.   Skin: No rash noted.     Results Review:    I have reviewed the laboratory and imaging data since the last note by [facility] physician.  My annotations are noted in assessment and plan.    Medication Review:  I have reviewed the current MAR.  My annotations are noted in assessment and plan.       Plan   PCCM Problems  Pleuritic chest pain  Elevated markers for infection  Underlying ILD  CLL not on treatment  CKD  GERD  Likely underlying COPD ex-smoker      Plan of Treatment  She feels substantially better.  Pleuritic chest pain has resolved.  Her cough and shortness of breath are also improved.  She is currently on levofloxacin which can be  switched to p.o.  I would recommend a 7-day course.    Underlying ILD for which she should continue follow-up with [facility] pulmonary group.    Underlying COPD given her smoking history.  Consider long acting beta agonist for maintenance.    No objections to discharge tomorrow with further follow-up by [facility] pulmonary group.  She should be evaluated for oxygen therapy at home prior to discharge with a walking oximetry.    Rajiv Moore MD  12/17/19  4:35 PM    Part of this note may be an electronic transcription/translation of spoken language to printed text using the Dragon Dictation System.

## 2019-12-17 NOTE — CONSULTS
Patient Name: Dionne Kaiser  :1933  86 y.o.    Date of Admission: 2019  Date of Consultation:  19  Encounter Provider: Kya Soler RN  Place of Service: [facility] CARDIOLOGY  Referring Provider: Parviz Navarro Jr.,*  Patient Care Team:  Parviz Navarro Jr., MD as PCP - General (Internal Medicine)  Parviz Navarro Jr., MD as PCP - Claims Attributed  Code, Piyush ERWIN II, MD as Consulting Physician (Hematology and Oncology)  Parviz Navarro Jr., MD as Referring Physician (Internal Medicine)      Chief complaint: chest pain    Reason for Consult: chest pain    History of Present Illness: Ms Kaiser is a 86 year old female patient with history of CLL, ILD, hypothyroidism, UC and GERD who presented to [facility] ED 19 with reports of constant sharp left-sided chest pain that awoken her at 4:45 that morning.  She had worsening chest pain with deep inspiration which awoke her in the middle of the night.  She thought initially it was gas, however it did not improve with belching.  She presented to the emergency room later in the day and continued to have pleuritic chest pain.  It has slowly diminished overnight and this morning she is much more comfortable.  She had no associated symptoms of nausea, vomiting, diaphoresis, shortness of breath or palpitations.  She had a stress test about 5 years ago which was normal.  She has never had an MI, stroke or other abnormal cardiac testing.  [facility].  She states she is never had heart failure and is    Troponin was negative. ProBNP 2,378. WBC 19.60. Follow up CTA chest was negative for any evidence of pulmonary embolism. She received a GI cocktail and was admitted. Repeat troponin was negative. An echocardiogram has been ordered and is pending.     CXR 19  IMPRESSIONS: No evidence of active disease within the chest.    Past  Medical History:   Diagnosis Date   • Arrhythmia    • Arthritis    • Asthma    • CKD (chronic kidney disease)    • CLL (chronic lymphocytic leukemia) (CMS/[facility])     Stage 0, trisomy 12   • GERD (gastroesophageal reflux disease)    • H/O Frequent PACs and PVCs    • H/O Lymphocytosis    • H/O Transient amnesia    • HTN (hypertension)    • Hyperlipidemia    • Hypothyroid    • Lumbar stenosis    • Neuropathy     Legs   • Osteopenia    • Pain     H/O diffuse chest, epigastric and lower abdominal pain   • Seasonal allergies    • Ulcerative colitis (CMS/[facility])        Past Surgical History:   Procedure Laterality Date   • SKIN CANCER EXCISION  02/2015   • TONSILLECTOMY           Prior to Admission medications    Medication Sig Start Date End Date Taking? Authorizing Provider   Albuterol (PROVENTIL IN) Inhale As Needed.   Yes Jose Brunson MD   amLODIPine (NORVASC) 5 MG tablet Norvasc 5 mg tablet   Take 1 tablet every day by oral route.   Yes ProviderJose MD   Azilsartan-Chlorthalidone (EDARBYCLOR) 40-12.5 MG tablet Take  by mouth. Pt takes half dose at night   Yes Jose Brunson MD   dextromethorphan-guaifenesin (ROBITUSSIN-DM)  MG/5ML syrup Take 5 mL by mouth Every 4 (Four) Hours As Needed.   Yes Jose Brunson MD   folic acid (FOLVITE) 1 MG tablet Take 1 mg by mouth daily.   Yes Jose Brunson MD   levothyroxine (SYNTHROID, LEVOTHROID) 50 MCG tablet Take 50 mcg by mouth daily.   Yes Jose Brunson MD   Loratadine 10 MG capsule Take 10 mg by mouth daily.   Yes ProviderJose MD   metoprolol succinate XL (TOPROL-XL) 25 MG 24 hr tablet TAKE ONE TABLET BY MOUTH ONE TIME DAILY  1/29/16  Yes Troy Verde MD   omeprazole (PriLOSEC) 20 MG capsule Take 20 mg by mouth daily.   Yes ProviderJose MD   rosuvastatin (CRESTOR) 20 MG tablet Take  by mouth Daily. 9/6/12  Yes Jose Brunson MD   sulfaSALAzine (AZULFIDINE) 500 MG tablet Take 500 mg by mouth 4 (four)  times a day.   Yes Jose Brunson MD   Cholecalciferol (VITAMIN D-3 PO) Take 2,000 Units by mouth daily.    Jose Brunson MD   meloxicam (MOBIC) 15 MG tablet Take 15 mg by mouth daily.    Jose Brunson MD   valsartan (DIOVAN) 320 MG tablet Take 320 mg by mouth daily.    Jose Brunson MD       Allergies   Allergen Reactions   • Penicillins Anaphylaxis       Social History     Socioeconomic History   • Marital status:      Spouse name: Not on file   • Number of children: Not on file   • Years of education: Not on file   • Highest education level: Not on file   Occupational History     Employer: RETIRED   Tobacco Use   • Smoking status: Former Smoker     Packs/day: 1.50     Years: 40.00     Pack years: 60.00     Types: Cigarettes     Last attempt to quit:      Years since quittin.9   • Smokeless tobacco: Former User   Substance and Sexual Activity   • Alcohol use: Yes     Alcohol/week: 7.0 standard drinks     Types: 7 Glasses of wine per week     Comment: 1 drink daily   • Drug use: Never   • Sexual activity: Defer   Social History Narrative    housewife       Family History   Problem Relation Age of Onset   • Colon cancer Mother    • Alcohol abuse Father    • Other Neg Hx         Negative for premature coronary disease       REVIEW OF SYSTEMS:   All systems reviewed.  Pertinent positives identified in HPI.  All other systems are negative.      Objective:     Vitals:    19 2121 19 2128 19 2334 19 0540   BP:   118/57    BP Location:   Left arm    Patient Position:   Lying    Pulse: 76  90    Resp: 18 18 18    Temp:   97.7 °F (36.5 °C)    TempSrc:   Oral    SpO2: 95%  92%    Weight:    76.4 kg (168 lb 8 oz)   Height:         Body mass index is 29.12 kg/m².    General Appearance:    Alert, cooperative, in no acute distress   Head:    Normocephalic, without obvious abnormality, atraumatic   Eyes:            Lids and lashes normal, conjunctivae and  sclerae normal, no icterus, no pallor, corneas clear, PERRLA   Ears:    Ears appear intact with no abnormalities noted   Throat:   No oral lesions, no thrush, oral mucosa moist   Neck:   No adenopathy, supple, trachea midline, no thyromegaly, no carotid bruit, no JVD   Back:     No kyphosis present, no scoliosis present, no skin lesions, erythema or scars, no tenderness to percussion or palpation, range of motion normal   Lungs:     Clear to auscultation, respirations regular, even and unlabored    Heart:    Regular rhythm and normal rate, normal S1 and S2, no murmur, no gallop, no rub, no click   Chest Wall:    No abnormalities observed   Abdomen:     Normal bowel sounds, no masses, no organomegaly, soft, nontender, nondistended, no guarding, no rebound  tenderness   Extremities:   Moves all extremities well, no edema, no cyanosis, no redness   Pulses:   Pulses palpable and equal bilaterally. Normal radial, carotid, femoral, dorsalis pedis and posterior tibial pulses bilaterally. Normal abdominal aorta   Skin:  Psychiatric:   No bleeding, bruising or rash    Alert and oriented x 3, normal mood and affect   Lab Review:     Results from last 7 days   Lab Units 12/16/19  0610   SODIUM mmol/L 142   POTASSIUM mmol/L 4.0   CHLORIDE mmol/L 103   CO2 mmol/L 28.2   BUN mg/dL 20   CREATININE mg/dL 0.91   CALCIUM mg/dL 8.9   BILIRUBIN mg/dL 0.4   ALK PHOS U/L 71   ALT (SGPT) U/L 15   AST (SGOT) U/L 19   GLUCOSE mg/dL 116*     Results from last 7 days   Lab Units 12/16/19  0913 12/16/19  0610   TROPONIN T ng/mL <0.010 <0.010     Results from last 7 days   Lab Units 12/16/19  0610   WBC 10*3/mm3 19.60*   HEMOGLOBIN g/dL 11.1*   HEMATOCRIT % 34.8   PLATELETS 10*3/mm3 254                         EKG 12/16/19 @ 0751      Admit EKG 12/16/19 @ 0540      I personally viewed and interpreted the patient's EKG/Telemetry data.  EKG 1217: Normal sinus rhythm, left axis, no ischemic changes.  EKG 12/16: Normal sinus rhythm, ventricular  arianna.    Assessment and Plan:       1.  Pleuritic chest pain: Symptoms do not sound ischemic, troponin negative x 3.  There is no obvious pericarditis on EKG.  Agree with echocardiogram to assess for pericardial effusion. Normal nuclear in 2015. Could consider repeat ischemic eval if no other revealing causes.   2.  Elevated BNP, shortness of breath: Echocardiogram as above.  Will get any right heart cath records from Dr. Carty's office; I am assuming she has some degree of pulmonary hypertension with a diagnosis of interstitial lung disease.  3.  Pneumonia: Antibiotics per pulmonary team  4.  Leukocytosis: Likely related to CLL  5.  COPD    Liv Vizcaino MD  [facility]  12/17/19

## 2019-12-17 NOTE — CONSULTS
[facility]  Phone: [phone]  Rajiv Moore MD      Subjective   LOS: 0 days     Thank you for this consultation.  86-year-old female with known interstitial lung disease and followed by [facility] pulmonary group.  She has been a smoker in the past from age 16 to age 40.  Unclear if she has a diagnosis of COPD.  However daughter informs me that she gets pulse dose of steroids intermittently.  She has a chronic cough which she states she has had all her life.  She has significant reflux disease.  She has to take acid suppressants to prevent the reflux from flaring up.  She came in with chest pain of acute onset.  This was all across the chest and occurring with deep breaths.  She had a CTA which was negative for PE.  It did not show any acute appearing infiltrates though she does show some progression of her ILD.  We were asked to comment on the same.    Patient significantly also has CLL which is in remission.  She has not required treatment for this.  She has chronic kidney disease and a GFR is 59.  She denies any fever or chills or rigors.  She denies any phlegm of any significance.  She denies any recent sick contacts.  Patient was short of breath and has been placed on oxygen.  She is not on oxygen at home.    Dionne Kaiser  reports that she drinks about 7.0 standard drinks of alcohol per week.,  reports that she quit smoking about 41 years ago. Her smoking use included cigarettes. She has a 60.00 pack-year smoking history. She has quit using smokeless tobacco.     Past Hx:  has a past medical history of Arrhythmia, Arthritis, Asthma, CKD (chronic kidney disease), CLL (chronic lymphocytic leukemia) (CMS/[facility]), GERD (gastroesophageal reflux disease), H/O Frequent PACs and PVCs, H/O Lymphocytosis, H/O Transient amnesia, HTN (hypertension), Hyperlipidemia, Hypothyroid, Lumbar stenosis, Neuropathy, Osteopenia, Pain, Seasonal allergies, and Ulcerative colitis (CMS/[facility]).  Surg Hx:  has a past surgical  history that includes Skin cancer excision (02/2015) and Tonsillectomy.  FH: family history includes Alcohol abuse in her father; Colon cancer in her mother.  SH:  reports that she quit smoking about 41 years ago. Her smoking use included cigarettes. She has a 60.00 pack-year smoking history. She has quit using smokeless tobacco. She reports that she drinks about 7.0 standard drinks of alcohol per week. She reports that she does not use drugs.    Medications Prior to Admission   Medication Sig Dispense Refill Last Dose   • Albuterol (PROVENTIL IN) Inhale As Needed.   Taking   • amLODIPine (NORVASC) 5 MG tablet Norvasc 5 mg tablet   Take 1 tablet every day by oral route.   Taking   • Azilsartan-Chlorthalidone (EDARBYCLOR) 40-12.5 MG tablet Take  by mouth. Pt takes half dose at night      • dextromethorphan-guaifenesin (ROBITUSSIN-DM)  MG/5ML syrup Take 5 mL by mouth Every 4 (Four) Hours As Needed.   Taking   • folic acid (FOLVITE) 1 MG tablet Take 1 mg by mouth daily.   Taking   • levothyroxine (SYNTHROID, LEVOTHROID) 50 MCG tablet Take 50 mcg by mouth daily.   Taking   • Loratadine 10 MG capsule Take 10 mg by mouth daily.   Taking   • metoprolol succinate XL (TOPROL-XL) 25 MG 24 hr tablet TAKE ONE TABLET BY MOUTH ONE TIME DAILY  30 tablet 0 Taking   • omeprazole (PriLOSEC) 20 MG capsule Take 20 mg by mouth daily.   Taking   • rosuvastatin (CRESTOR) 20 MG tablet Take  by mouth Daily.   Taking   • sulfaSALAzine (AZULFIDINE) 500 MG tablet Take 500 mg by mouth 4 (four) times a day.   Taking   • Cholecalciferol (VITAMIN D-3 PO) Take 2,000 Units by mouth daily.   Taking   • meloxicam (MOBIC) 15 MG tablet Take 15 mg by mouth daily.   Taking   • valsartan (DIOVAN) 320 MG tablet Take 320 mg by mouth daily.   Taking     Allergies   Allergen Reactions   • Penicillins Anaphylaxis       Review of Systems   Constitutional: Negative for chills and fever.   HENT: Negative for congestion, postnasal drip, sore throat and  trouble swallowing.    Respiratory: Positive for cough and shortness of breath. Negative for wheezing.    Cardiovascular: Positive for chest pain. Negative for leg swelling.   Gastrointestinal: Negative for abdominal pain, diarrhea, nausea and vomiting.   Endocrine: Negative for cold intolerance and heat intolerance.   Genitourinary: Negative for frequency and urgency.   Musculoskeletal: Negative for arthralgias and back pain.   Skin: Negative for pallor and rash.   Neurological: Negative for seizures and headaches.   Psychiatric/Behavioral: Negative for confusion. The patient is not nervous/anxious.      Vital Signs past 24hrs  BP range: BP: (100-152)/(55-86) 100/59  Pulse range: Heart Rate:  [54-91] 86  Resp rate range: Resp:  [16-20] 20  Temp range: Temp (24hrs), Av.2 °F (37.3 °C), Min:98.4 °F (36.9 °C), Max:99.9 °F (37.7 °C)    Oxygen range: SpO2:  [92 %-97 %] 93 %; Flow (L/min):  [2] 2;   Device (Oxygen Therapy): nasal cannula  73.7 kg (162 lb 8 oz); Body mass index is 28.09 kg/m².  No intake/output data recorded.    Adult female who looks about his stated age.  Sitting in a chair.  Pupils equal react light.  Oropharynx moist class III Mallampati airway.  No posterior pharyngeal discharge.  Nasopharynx without discharge septum midline.  JVP unable to assess.  Trachea midline thyroid not enlarged.  Lungs reveal bilateral air entry.  Fine rales heard in lung bases and fever in the mid and upper lung fields.  No wheezing.  Percussion note resonant chest expansion equal no chest wall deformity or tenderness.  Heart examination S1-S2 present rhythm regular no murmurs.  No edema in the lower extremities.  Abdomen is soft nontender bowel sounds present no liver spleen enlargement.  No peripheral cyanosis clubbing.  Moves all 4 extremities sensorimotor intact.  No cervical, axillary, inguinal adenopathy.    Results Review:    I have reviewed the laboratory and imaging data from current admission. My annotations are  as noted in assessment and plan.    Medication Review:  I have reviewed the current MAR. My annotations are as noted in assessment and plan.    Plan   PCCM Problems  Pleuritic chest pain  Elevated markers for infection  Underlying ILD  CLL not on treatment  CKD  GERD  Likely underlying COPD ex-smoker          Plan of Treatment  With pleuritic chest pain elevated white count and positive pro calcitonin it would be prudent to treat with antibiotics despite clear chest x-ray.  She also has CLL and is a immunocompromise host.  I will start her on some ceftriaxone and azithromycin.  This can later be switched to p.o. antibiotics.  Since she is allergic to penicillin I will give her levofloxacin.    Underlying ILD which is slowly progressive.  She follows with a [facility] pulmonary group and will keep her appointments with them.  Possible etiologies include UIP, GERD related or other etiologies.  Acute work-up in the hospital is not required.  I would advise against treatment with steroids unless necessary for some other purpose.    She likely has underlying COPD given her smoking history.  She does not appear to be having an exacerbation.  Continue duo nebs 4 times daily.    She has a chronic cough again likely from her ILD.  She does not feel it is much worse.  Symptomatic treatment with Hycodan can be tried.  Effective treatment of GERD may also help.      Part of this note may be an electronic transcription/translation of spoken language to printed text using the Dragon Dictation System.

## 2019-12-18 VITALS
TEMPERATURE: 97.8 F | RESPIRATION RATE: 18 BRPM | SYSTOLIC BLOOD PRESSURE: 102 MMHG | DIASTOLIC BLOOD PRESSURE: 57 MMHG | BODY MASS INDEX: 28.58 KG/M2 | WEIGHT: 167.4 LBS | OXYGEN SATURATION: 94 % | HEIGHT: 64 IN | HEART RATE: 118 BPM

## 2019-12-18 PROBLEM — R07.81 PLEURITIC CHEST PAIN: Status: RESOLVED | Noted: 2019-12-16 | Resolved: 2019-12-18

## 2019-12-18 LAB
C-ANCA TITR SER IF: NORMAL TITER
MYELOPEROXIDASE AB SER-ACNC: <9 U/ML (ref 0–9)
P-ANCA ATYPICAL TITR SER IF: NORMAL TITER
P-ANCA TITR SER IF: NORMAL TITER
PROTEINASE3 AB SER IA-ACNC: <3.5 U/ML (ref 0–3.5)

## 2019-12-18 PROCEDURE — G0378 HOSPITAL OBSERVATION PER HR: HCPCS

## 2019-12-18 PROCEDURE — 94799 UNLISTED PULMONARY SVC/PX: CPT

## 2019-12-18 PROCEDURE — 94618 PULMONARY STRESS TESTING: CPT

## 2019-12-18 RX ORDER — LEVOTHYROXINE SODIUM 0.07 MG/1
75 TABLET ORAL DAILY
Qty: 30 TABLET | Refills: 11 | Status: SHIPPED | OUTPATIENT
Start: 2019-12-18 | End: 2020-01-17

## 2019-12-18 RX ORDER — LEVOFLOXACIN 500 MG/1
500 TABLET, FILM COATED ORAL EVERY 24 HOURS
Qty: 5 TABLET | Refills: 0 | Status: SHIPPED | OUTPATIENT
Start: 2019-12-18 | End: 2019-12-23

## 2019-12-18 RX ADMIN — LEVOFLOXACIN 500 MG: 500 TABLET, FILM COATED ORAL at 09:40

## 2019-12-18 RX ADMIN — SODIUM CHLORIDE, PRESERVATIVE FREE 10 ML: 5 INJECTION INTRAVENOUS at 09:41

## 2019-12-18 RX ADMIN — SULFASALAZINE 500 MG: 500 TABLET ORAL at 11:38

## 2019-12-18 RX ADMIN — PANTOPRAZOLE SODIUM 40 MG: 40 TABLET, DELAYED RELEASE ORAL at 06:14

## 2019-12-18 RX ADMIN — LEVOTHYROXINE SODIUM 75 MCG: 75 TABLET ORAL at 06:14

## 2019-12-18 RX ADMIN — SULFASALAZINE 500 MG: 500 TABLET ORAL at 06:14

## 2019-12-18 RX ADMIN — ASPIRIN 325 MG: 325 TABLET ORAL at 09:40

## 2019-12-18 RX ADMIN — METOPROLOL SUCCINATE 50 MG: 50 TABLET, FILM COATED, EXTENDED RELEASE ORAL at 09:40

## 2019-12-18 RX ADMIN — IPRATROPIUM BROMIDE AND ALBUTEROL SULFATE 3 ML: 2.5; .5 SOLUTION RESPIRATORY (INHALATION) at 08:48

## 2019-12-18 NOTE — DISCHARGE SUMMARY
Discharge summary: Observation status    Date of admission: December 16, 2019.    Date of discharge: December 18, 2019.    Discharge diagnoses:  Pleuritic chest pain  Elevated markers for infection  Underlying interstitial lung disease  Chronic lymphocytic leukemia, not requiring treatment.  Chronic kidney disease  GERD  COPD  Paroxysmal sinus tachycardia with PVCs  Hypothyroidism  Allergic rhinitis  Lumbar spinal stenosis  Ulcerative colitis  Osteopenia  Hypertension      Brief history: 86-year-old white female was doing well until she woke up on the morning of admission at 3:45 with severe substernal left-sided chest pain.  The pain was pleuritic.  It was associated with some shortness of breath.  She therefore presented to the emergency room.    In the emergency room EKG and troponin were negative.  Chest x-ray showed no infiltrate.  CT scan of the chest showed no pulmonary embolus.  Findings of pulmonary fibrosis had mildly progressed since January 2017.  The pattern is most consistent with UIP.  CBC showed elevated white count, consistent with her CLL, but with numerous neutrophils (which was different than her baseline).  Chest pain was pleuritic and worse when she would lay supine.  She required morphine sulfate to control the pain.  It was felt best to hospitalize the patient.        Hospital course: Patient was admitted to the monitored floor.  Pro calcitonin was done and returned elevated (0.44).  The possibility of pericarditis was entertained because of the pleuritic chest pain which was worse with supine position.  However EKG did not show findings consistent with pericarditis and there is no audible friction rub.    Patient was seen in consultation by Dr. Moore, pulmonary.  He felt in light of the elevated white blood cell count with left shift and the elevated pro calcitonin that this should be treated as infection.  Patient was begun on Levaquin.  Patient was also treated with duo nebs.    Patient was  seen in consultation by cardiology, Dr. Vizcaino.  She doubted that the chest pain was cardiac in etiology.  Patient did have a slightly elevated BNP.  An echocardiogram was performed.  This revealed normal LV function with ejection fraction of 59%.  There is grade 1 diastolic dysfunction.  This was felt to be abnormal but consistent with age.  There is no significant valvular disease.  There is a trivial pericardial effusion.    Patient responded well to the Levaquin.  Her chest pain subsided.  Yesterday she ambulated with physical therapy and did well.  We are going to check her with exercise oximetry this morning.  If she requires home O2 we will arrange this.  If not she will be discharged without the home O2.  She will finish up her course of Levaquin.    This patient is followed by Dr. Carty, pulmonary, for her interstitial lung disease and COPD.  She will follow-up with him soon after discharge.  I will see if we can get patient an Anoro inhaler to use as maintenance along with her rescue Proventil inhaler.    At time of discharge sodium 139, potassium 4.2, CO2 26, chloride 97, creatinine 1.06, BUN 26, calcium 9.3, glucose 129, estimated GFR 49 cc/min.  White blood cell count 22,000 with hemoglobin 11.5 hematocrit 33.5 and platelets 235,000 differential cells to 74 polys and 9 lymphs.  EKG shows normal sinus rhythm with prolonged ME interval and slightly decreased voltage in limb leads.  Blood cultures have shown no growth thus far.  Viral respiratory panel was negative.  MRSA screen was negative.    At time of discharge blood pressure is 110/60.  Pulse was 105, respirations 20, and temperature is 97.8.  Heart rapid regular rhythm with occasional irregular beats.  Lungs clear to auscultation.  Abdomen backslash present nontender extremities without edema.    Discharge medications:  Crestor 20 mg p.o. Daily  Robitussin with codeine.  Folic acid 1 mg p.o. Daily  Levaquin 500 mg p.o. daily x5  doses.  Levothyroxine 75 mcg p.o. Daily.  Loratadine 10 mg p.o. Daily  Toprol-XL 25 mg p.o. Daily  Omeprazole 20 mg p.o. Daily  Proventil inhaler 2 puffs every 4 hours as needed  Sulfasalazine 500 mg 4 times daily  Vitamin D3 2000 units p.o. Daily  Anoro inhaler; 1 puff daily.    Diet: Healthy heart.    Activity: Ad al.    Follow-up with me within the next 2 weeks.  Patient will follow-up with Dr. Carty within the next month.      Fabien Navarro MD

## 2019-12-18 NOTE — PROGRESS NOTES
Case Management Discharge Note      Final Note: Patient has been DC'd home              Transportation Services  Private: Car    Final Discharge Disposition Code: 01 - home or self-care

## 2019-12-18 NOTE — PROGRESS NOTES
Exercise Oximetry    Patient Name:Dionne Kaiser   MRN: [id number]   Date: 12/18/19             ROOM AIR BASELINE   SpO2% 99   Heart Rate 118   Blood Pressure 102/57     EXERCISE ON ROOM AIR SpO2% EXERCISE ON O2 @  LPM SpO2%   1 MINUTE 97 1 MINUTE    2 MINUTES 96 2 MINUTES    3 MINUTES 96 3 MINUTES    4 MINUTES 95 4 MINUTES    5 MINUTES 96 5 MINUTES    6 MINUTES 95 6 MINUTES               Distance Walked  Multiple laps around nursing unit Distance Walked   Dyspnea (Janae Scale)  1 Dyspnea (Janae Scale)   Fatigue (Janae Scale)   Fatigue (Janae Scale)   SpO2% Post Exercise  96 SpO2% Post Exercise   HR Post Exercise  145 HR Post Exercise   Time to Recovery   Time to Recovery     Comments: Pt ambulated briskly around nursing unit for 6 minutes.02 saturation was 99-97% during walk. Pt used rolling walker and denied SOA and fatigue. HR was elevated during walk in the 140-150's but decreased to 120's once back in room. Forehead probe was used during walk.

## 2019-12-21 LAB
BACTERIA SPEC AEROBE CULT: NORMAL
BACTERIA SPEC AEROBE CULT: NORMAL

## 2020-01-02 ENCOUNTER — TRANSCRIBE ORDERS (OUTPATIENT)
Dept: ADMINISTRATIVE | Facility: HOSPITAL | Age: 85
End: 2020-01-02

## 2020-01-02 ENCOUNTER — HOSPITAL ENCOUNTER (OUTPATIENT)
Dept: GENERAL RADIOLOGY | Facility: HOSPITAL | Age: 85
Discharge: HOME | End: 2020-01-02
Admitting: INTERNAL MEDICINE

## 2020-01-02 ENCOUNTER — LAB (OUTPATIENT)
Dept: LAB | Facility: HOSPITAL | Age: 85
End: 2020-01-02

## 2020-01-02 DIAGNOSIS — R05.9 COUGH: ICD-10-CM

## 2020-01-02 DIAGNOSIS — R05.9 COUGH: Primary | ICD-10-CM

## 2020-01-02 DIAGNOSIS — R11.2 NAUSEA AND VOMITING, INTRACTABILITY OF VOMITING NOT SPECIFIED, UNSPECIFIED VOMITING TYPE: ICD-10-CM

## 2020-01-02 LAB
ALBUMIN SERPL-MCNC: 4.2 G/DL (ref 3.5–5.2)
ALBUMIN/GLOB SERPL: 1.2 G/DL
ALP SERPL-CCNC: 146 U/L (ref 39–117)
ALT SERPL W P-5'-P-CCNC: 45 U/L (ref 1–33)
ANION GAP SERPL CALCULATED.3IONS-SCNC: 17.5 MMOL/L (ref 5–15)
AST SERPL-CCNC: 56 U/L (ref 1–32)
BASOPHILS # BLD AUTO: 0.06 10*3/MM3 (ref 0–0.2)
BASOPHILS NFR BLD AUTO: 0.3 % (ref 0–1.5)
BILIRUB SERPL-MCNC: 0.7 MG/DL (ref 0.2–1.2)
BUN BLD-MCNC: 22 MG/DL (ref 8–23)
BUN/CREAT SERPL: 15.4 (ref 7–25)
CALCIUM SPEC-SCNC: 9.6 MG/DL (ref 8.6–10.5)
CHLORIDE SERPL-SCNC: 93 MMOL/L (ref 98–107)
CO2 SERPL-SCNC: 25.5 MMOL/L (ref 22–29)
CREAT BLD-MCNC: 1.43 MG/DL (ref 0.57–1)
DEPRECATED RDW RBC AUTO: 40.7 FL (ref 37–54)
EOSINOPHIL # BLD AUTO: 0.01 10*3/MM3 (ref 0–0.4)
EOSINOPHIL NFR BLD AUTO: 0.1 % (ref 0.3–6.2)
ERYTHROCYTE [DISTWIDTH] IN BLOOD BY AUTOMATED COUNT: 12.4 % (ref 12.3–15.4)
GFR SERPL CREATININE-BSD FRML MDRD: 35 ML/MIN/1.73
GLOBULIN UR ELPH-MCNC: 3.5 GM/DL
GLUCOSE BLD-MCNC: 134 MG/DL (ref 65–99)
HCT VFR BLD AUTO: 34.7 % (ref 34–46.6)
HGB BLD-MCNC: 11.7 G/DL (ref 12–15.9)
IMM GRANULOCYTES # BLD AUTO: 0.17 10*3/MM3 (ref 0–0.05)
IMM GRANULOCYTES NFR BLD AUTO: 0.9 % (ref 0–0.5)
LYMPHOCYTES # BLD AUTO: 3.18 10*3/MM3 (ref 0.7–3.1)
LYMPHOCYTES NFR BLD AUTO: 17.1 % (ref 19.6–45.3)
MCH RBC QN AUTO: 30.7 PG (ref 26.6–33)
MCHC RBC AUTO-ENTMCNC: 33.7 G/DL (ref 31.5–35.7)
MCV RBC AUTO: 91.1 FL (ref 79–97)
MONOCYTES # BLD AUTO: 3.22 10*3/MM3 (ref 0.1–0.9)
MONOCYTES NFR BLD AUTO: 17.3 % (ref 5–12)
NEUTROPHILS # BLD AUTO: 11.98 10*3/MM3 (ref 1.7–7)
NEUTROPHILS NFR BLD AUTO: 64.3 % (ref 42.7–76)
NRBC BLD AUTO-RTO: 0.1 /100 WBC (ref 0–0.2)
PLATELET # BLD AUTO: 387 10*3/MM3 (ref 140–450)
PMV BLD AUTO: 9.7 FL (ref 6–12)
POTASSIUM BLD-SCNC: 4 MMOL/L (ref 3.5–5.2)
PROCALCITONIN SERPL-MCNC: 0.17 NG/ML (ref 0.1–0.25)
PROT SERPL-MCNC: 7.7 G/DL (ref 6–8.5)
RBC # BLD AUTO: 3.81 10*6/MM3 (ref 3.77–5.28)
SODIUM BLD-SCNC: 136 MMOL/L (ref 136–145)
WBC NRBC COR # BLD: 18.62 10*3/MM3 (ref 3.4–10.8)

## 2020-01-02 PROCEDURE — 36415 COLL VENOUS BLD VENIPUNCTURE: CPT

## 2020-01-02 PROCEDURE — 80053 COMPREHEN METABOLIC PANEL: CPT

## 2020-01-02 PROCEDURE — 85025 COMPLETE CBC W/AUTO DIFF WBC: CPT

## 2020-01-02 PROCEDURE — 84145 PROCALCITONIN (PCT): CPT

## 2020-01-02 PROCEDURE — 71046 X-RAY EXAM CHEST 2 VIEWS: CPT

## 2020-01-15 ENCOUNTER — TRANSCRIBE ORDERS (OUTPATIENT)
Dept: ADMINISTRATIVE | Facility: HOSPITAL | Age: 85
End: 2020-01-15

## 2020-01-15 DIAGNOSIS — R06.00 DYSPNEA, UNSPECIFIED TYPE: Primary | ICD-10-CM

## 2020-01-24 ENCOUNTER — OFFICE (OUTPATIENT)
Dept: URBAN - METROPOLITAN AREA CLINIC 75 | Facility: CLINIC | Age: 85
End: 2020-01-24

## 2020-01-24 VITALS
RESPIRATION RATE: 16 BRPM | HEIGHT: 64 IN | DIASTOLIC BLOOD PRESSURE: 68 MMHG | SYSTOLIC BLOOD PRESSURE: 126 MMHG | WEIGHT: 160 LBS | HEART RATE: 78 BPM

## 2020-01-24 DIAGNOSIS — R05 COUGH: ICD-10-CM

## 2020-01-24 DIAGNOSIS — K51.90 ULCERATIVE COLITIS, UNSPECIFIED, WITHOUT COMPLICATIONS: ICD-10-CM

## 2020-01-24 DIAGNOSIS — K21.9 GASTRO-ESOPHAGEAL REFLUX DISEASE WITHOUT ESOPHAGITIS: ICD-10-CM

## 2020-01-24 PROCEDURE — 99204 OFFICE O/P NEW MOD 45 MIN: CPT | Performed by: INTERNAL MEDICINE

## 2020-01-24 NOTE — SERVICENOTES
records reviewed.  CT chest x-ray shows mild cardiomegaly.  No active disease.  CT scan diverticulosis.  Mediastinal hilar adenopathy.  Negative for PE.  Pulmonary fibrosis.

## 2020-01-24 NOTE — SERVICEHPINOTES
Thank you very much for referring Ms. Starkey for evaluation. She know she is a pleasant 86-year-old white female who does have a history of reflux, she is on PPI therapy and does have occasional breakthrough. She had some chest pain over the holiday and was in the hospital, cardiac workup was negative. She's also had a persistent cough. She's had this cough for about a year. It does come and go, she did not cough during my exam. She's had extensive workup for pulmonary and causes an for allergies. She doesn't smoke. She doesn't drink. There is no dysphagia, odynophagia nausea or vomiting. There is no melena or hematemesis. She had an upper endoscopy but it's been 6 her years or more. She is in no distress, she does not look acutely ill.She also has a history of colitis. She's stable on sulfasalazine, her last colonoscopy was less than 5 years ago according to the patient. She believes it was in 2018, her daughter thinks it was a little longer than that but she is up-to-date. Her mother primary colon cancer but she was 75 years old so that's not a risk factor for the patient. No diarrhea, constipation or bleeding. There is no abdominal pain or weight loss. She is in no distress, she does not look acutely ill.

## 2020-01-27 VITALS
RESPIRATION RATE: 16 BRPM | DIASTOLIC BLOOD PRESSURE: 54 MMHG | OXYGEN SATURATION: 95 % | OXYGEN SATURATION: 97 % | TEMPERATURE: 98.6 F | DIASTOLIC BLOOD PRESSURE: 68 MMHG | DIASTOLIC BLOOD PRESSURE: 57 MMHG | OXYGEN SATURATION: 94 % | HEART RATE: 81 BPM | RESPIRATION RATE: 20 BRPM | SYSTOLIC BLOOD PRESSURE: 161 MMHG | SYSTOLIC BLOOD PRESSURE: 130 MMHG | RESPIRATION RATE: 14 BRPM | HEIGHT: 64 IN | OXYGEN SATURATION: 99 % | HEART RATE: 80 BPM | SYSTOLIC BLOOD PRESSURE: 123 MMHG | HEART RATE: 89 BPM | HEART RATE: 87 BPM | SYSTOLIC BLOOD PRESSURE: 104 MMHG | DIASTOLIC BLOOD PRESSURE: 76 MMHG | DIASTOLIC BLOOD PRESSURE: 71 MMHG | SYSTOLIC BLOOD PRESSURE: 152 MMHG | TEMPERATURE: 98.5 F | HEART RATE: 82 BPM | SYSTOLIC BLOOD PRESSURE: 109 MMHG | WEIGHT: 164 LBS

## 2020-01-29 ENCOUNTER — AMBULATORY SURGICAL CENTER (OUTPATIENT)
Dept: URBAN - METROPOLITAN AREA SURGERY 17 | Facility: SURGERY | Age: 85
End: 2020-01-29

## 2020-01-29 ENCOUNTER — OFFICE (OUTPATIENT)
Dept: URBAN - METROPOLITAN AREA PATHOLOGY 4 | Facility: PATHOLOGY | Age: 85
End: 2020-01-29
Payer: MEDICARE

## 2020-01-29 DIAGNOSIS — K29.50 UNSPECIFIED CHRONIC GASTRITIS WITHOUT BLEEDING: ICD-10-CM

## 2020-01-29 DIAGNOSIS — K44.9 DIAPHRAGMATIC HERNIA WITHOUT OBSTRUCTION OR GANGRENE: ICD-10-CM

## 2020-01-29 DIAGNOSIS — R05 COUGH: ICD-10-CM

## 2020-01-29 DIAGNOSIS — K29.70 GASTRITIS, UNSPECIFIED, WITHOUT BLEEDING: ICD-10-CM

## 2020-01-29 DIAGNOSIS — R07.89 OTHER CHEST PAIN: ICD-10-CM

## 2020-01-29 DIAGNOSIS — K21.9 GASTRO-ESOPHAGEAL REFLUX DISEASE WITHOUT ESOPHAGITIS: ICD-10-CM

## 2020-01-29 DIAGNOSIS — K31.7 POLYP OF STOMACH AND DUODENUM: ICD-10-CM

## 2020-01-29 LAB
GI HISTOLOGY: A. SELECT: (no result)
GI HISTOLOGY: B. UNSPECIFIED: (no result)
GI HISTOLOGY: C. UNSPECIFIED: (no result)
GI HISTOLOGY: PDF REPORT: (no result)

## 2020-01-29 PROCEDURE — 88305 TISSUE EXAM BY PATHOLOGIST: CPT | Performed by: INTERNAL MEDICINE

## 2020-01-29 PROCEDURE — 43239 EGD BIOPSY SINGLE/MULTIPLE: CPT | Performed by: INTERNAL MEDICINE

## 2020-01-29 RX ORDER — OMEPRAZOLE 20 MG/1
40 CAPSULE, DELAYED RELEASE ORAL
Qty: 60 | Refills: 5 | Status: ACTIVE

## 2020-03-16 ENCOUNTER — APPOINTMENT (OUTPATIENT)
Dept: LAB | Facility: HOSPITAL | Age: 85
End: 2020-03-16

## 2020-05-04 ENCOUNTER — OFFICE VISIT (OUTPATIENT)
Dept: ONCOLOGY | Facility: CLINIC | Age: 85
End: 2020-05-04

## 2020-05-04 ENCOUNTER — APPOINTMENT (OUTPATIENT)
Dept: LAB | Facility: HOSPITAL | Age: 85
End: 2020-05-04

## 2020-05-04 DIAGNOSIS — C91.10 CLL (CHRONIC LYMPHOCYTIC LEUKEMIA) (HCC): Primary | ICD-10-CM

## 2020-05-04 PROCEDURE — 99441 PR PHYS/QHP TELEPHONE EVALUATION 5-10 MIN: CPT | Performed by: INTERNAL MEDICINE

## 2020-05-08 ENCOUNTER — OFFICE (OUTPATIENT)
Dept: URBAN - METROPOLITAN AREA CLINIC 75 | Facility: CLINIC | Age: 85
End: 2020-05-08

## 2020-05-08 VITALS — WEIGHT: 150 LBS | HEIGHT: 64 IN

## 2020-05-08 DIAGNOSIS — K51.90 ULCERATIVE COLITIS, UNSPECIFIED, WITHOUT COMPLICATIONS: ICD-10-CM

## 2020-05-08 DIAGNOSIS — R05 COUGH: ICD-10-CM

## 2020-05-08 DIAGNOSIS — K31.7 POLYP OF STOMACH AND DUODENUM: ICD-10-CM

## 2020-05-08 DIAGNOSIS — R07.89 OTHER CHEST PAIN: ICD-10-CM

## 2020-05-08 DIAGNOSIS — K29.70 GASTRITIS, UNSPECIFIED, WITHOUT BLEEDING: ICD-10-CM

## 2020-05-08 DIAGNOSIS — K44.9 DIAPHRAGMATIC HERNIA WITHOUT OBSTRUCTION OR GANGRENE: ICD-10-CM

## 2020-05-08 DIAGNOSIS — K21.9 GASTRO-ESOPHAGEAL REFLUX DISEASE WITHOUT ESOPHAGITIS: ICD-10-CM

## 2020-05-08 PROCEDURE — 99213 OFFICE O/P EST LOW 20 MIN: CPT | Mod: 95 | Performed by: INTERNAL MEDICINE

## 2020-10-19 ENCOUNTER — LAB (OUTPATIENT)
Dept: LAB | Facility: HOSPITAL | Age: 85
End: 2020-10-19

## 2020-10-19 ENCOUNTER — OFFICE VISIT (OUTPATIENT)
Dept: ONCOLOGY | Facility: CLINIC | Age: 85
End: 2020-10-19

## 2020-10-19 VITALS
TEMPERATURE: 97.8 F | RESPIRATION RATE: 18 BRPM | HEART RATE: 69 BPM | DIASTOLIC BLOOD PRESSURE: 88 MMHG | BODY MASS INDEX: 27 KG/M2 | OXYGEN SATURATION: 94 % | SYSTOLIC BLOOD PRESSURE: 179 MMHG | WEIGHT: 156.2 LBS

## 2020-10-19 DIAGNOSIS — C91.10 CLL (CHRONIC LYMPHOCYTIC LEUKEMIA) (HCC): ICD-10-CM

## 2020-10-19 DIAGNOSIS — C91.10 CLL (CHRONIC LYMPHOCYTIC LEUKEMIA) (HCC): Primary | ICD-10-CM

## 2020-10-19 LAB
BASOPHILS # BLD AUTO: 0.06 10*3/MM3 (ref 0–0.2)
BASOPHILS NFR BLD AUTO: 0.4 % (ref 0–1.5)
DEPRECATED RDW RBC AUTO: 44.8 FL (ref 37–54)
EOSINOPHIL # BLD AUTO: 0.17 10*3/MM3 (ref 0–0.4)
EOSINOPHIL NFR BLD AUTO: 1.1 % (ref 0.3–6.2)
ERYTHROCYTE [DISTWIDTH] IN BLOOD BY AUTOMATED COUNT: 13.1 % (ref 12.3–15.4)
HCT VFR BLD AUTO: 41.5 % (ref 34–46.6)
HGB BLD-MCNC: 13.4 G/DL (ref 12–15.9)
IMM GRANULOCYTES # BLD AUTO: 0.09 10*3/MM3 (ref 0–0.05)
IMM GRANULOCYTES NFR BLD AUTO: 0.6 % (ref 0–0.5)
LYMPHOCYTES # BLD AUTO: 9.09 10*3/MM3 (ref 0.7–3.1)
LYMPHOCYTES NFR BLD AUTO: 59.4 % (ref 19.6–45.3)
MCH RBC QN AUTO: 30.2 PG (ref 26.6–33)
MCHC RBC AUTO-ENTMCNC: 32.3 G/DL (ref 31.5–35.7)
MCV RBC AUTO: 93.7 FL (ref 79–97)
MONOCYTES # BLD AUTO: 1.33 10*3/MM3 (ref 0.1–0.9)
MONOCYTES NFR BLD AUTO: 8.7 % (ref 5–12)
NEUTROPHILS NFR BLD AUTO: 29.8 % (ref 42.7–76)
NEUTROPHILS NFR BLD AUTO: 4.57 10*3/MM3 (ref 1.7–7)
NRBC BLD AUTO-RTO: 0 /100 WBC (ref 0–0.2)
PLATELET # BLD AUTO: 242 10*3/MM3 (ref 140–450)
PMV BLD AUTO: 10.6 FL (ref 6–12)
RBC # BLD AUTO: 4.43 10*6/MM3 (ref 3.77–5.28)
WBC # BLD AUTO: 15.31 10*3/MM3 (ref 3.4–10.8)

## 2020-10-19 PROCEDURE — 99214 OFFICE O/P EST MOD 30 MIN: CPT | Performed by: INTERNAL MEDICINE

## 2020-10-19 PROCEDURE — 36415 COLL VENOUS BLD VENIPUNCTURE: CPT

## 2020-10-19 PROCEDURE — 85025 COMPLETE CBC W/AUTO DIFF WBC: CPT

## 2020-11-19 ENCOUNTER — OFFICE (OUTPATIENT)
Dept: URBAN - METROPOLITAN AREA CLINIC 75 | Facility: CLINIC | Age: 85
End: 2020-11-19

## 2020-11-19 VITALS — WEIGHT: 155 LBS | HEIGHT: 64 IN

## 2020-11-19 DIAGNOSIS — K31.7 POLYP OF STOMACH AND DUODENUM: ICD-10-CM

## 2020-11-19 DIAGNOSIS — K21.9 GASTRO-ESOPHAGEAL REFLUX DISEASE WITHOUT ESOPHAGITIS: ICD-10-CM

## 2020-11-19 DIAGNOSIS — K44.9 DIAPHRAGMATIC HERNIA WITHOUT OBSTRUCTION OR GANGRENE: ICD-10-CM

## 2020-11-19 DIAGNOSIS — K51.90 ULCERATIVE COLITIS, UNSPECIFIED, WITHOUT COMPLICATIONS: ICD-10-CM

## 2020-11-19 DIAGNOSIS — K29.70 GASTRITIS, UNSPECIFIED, WITHOUT BLEEDING: ICD-10-CM

## 2020-11-19 DIAGNOSIS — R05 COUGH: ICD-10-CM

## 2020-11-19 DIAGNOSIS — R07.89 OTHER CHEST PAIN: ICD-10-CM

## 2020-11-19 PROCEDURE — 99214 OFFICE O/P EST MOD 30 MIN: CPT | Performed by: INTERNAL MEDICINE

## 2021-01-06 ENCOUNTER — APPOINTMENT (OUTPATIENT)
Dept: WOMENS IMAGING | Facility: HOSPITAL | Age: 86
End: 2021-01-06

## 2021-01-06 PROCEDURE — 77063 BREAST TOMOSYNTHESIS BI: CPT | Performed by: RADIOLOGY

## 2021-01-06 PROCEDURE — 77067 SCR MAMMO BI INCL CAD: CPT | Performed by: RADIOLOGY

## 2021-01-08 VITALS
DIASTOLIC BLOOD PRESSURE: 88 MMHG | HEART RATE: 66 BPM | OXYGEN SATURATION: 94 % | SYSTOLIC BLOOD PRESSURE: 166 MMHG | OXYGEN SATURATION: 95 % | OXYGEN SATURATION: 98 % | RESPIRATION RATE: 23 BRPM | HEART RATE: 65 BPM | SYSTOLIC BLOOD PRESSURE: 192 MMHG | TEMPERATURE: 97 F | HEART RATE: 68 BPM | DIASTOLIC BLOOD PRESSURE: 84 MMHG | HEART RATE: 71 BPM | SYSTOLIC BLOOD PRESSURE: 153 MMHG | RESPIRATION RATE: 20 BRPM | HEART RATE: 70 BPM | OXYGEN SATURATION: 97 % | TEMPERATURE: 95.1 F | SYSTOLIC BLOOD PRESSURE: 178 MMHG | SYSTOLIC BLOOD PRESSURE: 172 MMHG | DIASTOLIC BLOOD PRESSURE: 65 MMHG | RESPIRATION RATE: 19 BRPM | OXYGEN SATURATION: 96 % | DIASTOLIC BLOOD PRESSURE: 64 MMHG | SYSTOLIC BLOOD PRESSURE: 163 MMHG | DIASTOLIC BLOOD PRESSURE: 80 MMHG | DIASTOLIC BLOOD PRESSURE: 90 MMHG | WEIGHT: 155 LBS | HEART RATE: 77 BPM | DIASTOLIC BLOOD PRESSURE: 86 MMHG | SYSTOLIC BLOOD PRESSURE: 144 MMHG | DIASTOLIC BLOOD PRESSURE: 81 MMHG | HEART RATE: 72 BPM | SYSTOLIC BLOOD PRESSURE: 155 MMHG | RESPIRATION RATE: 10 BRPM | HEIGHT: 64 IN | DIASTOLIC BLOOD PRESSURE: 79 MMHG | RESPIRATION RATE: 24 BRPM | RESPIRATION RATE: 18 BRPM | RESPIRATION RATE: 16 BRPM | SYSTOLIC BLOOD PRESSURE: 162 MMHG

## 2021-01-12 ENCOUNTER — AMBULATORY SURGICAL CENTER (OUTPATIENT)
Dept: URBAN - METROPOLITAN AREA SURGERY 17 | Facility: SURGERY | Age: 86
End: 2021-01-12

## 2021-01-12 ENCOUNTER — OFFICE (OUTPATIENT)
Dept: URBAN - METROPOLITAN AREA PATHOLOGY 4 | Facility: PATHOLOGY | Age: 86
End: 2021-01-12
Payer: MEDICARE

## 2021-01-12 DIAGNOSIS — K52.9 NONINFECTIVE GASTROENTERITIS AND COLITIS, UNSPECIFIED: ICD-10-CM

## 2021-01-12 DIAGNOSIS — D12.3 BENIGN NEOPLASM OF TRANSVERSE COLON: ICD-10-CM

## 2021-01-12 DIAGNOSIS — K57.30 DIVERTICULOSIS OF LARGE INTESTINE WITHOUT PERFORATION OR ABS: ICD-10-CM

## 2021-01-12 DIAGNOSIS — K51.90 ULCERATIVE COLITIS, UNSPECIFIED, WITHOUT COMPLICATIONS: ICD-10-CM

## 2021-01-12 LAB
GI HISTOLOGY: A. UNSPECIFIED: (no result)
GI HISTOLOGY: B. UNSPECIFIED: (no result)
GI HISTOLOGY: C. UNSPECIFIED: (no result)
GI HISTOLOGY: D. UNSPECIFIED: (no result)
GI HISTOLOGY: E. UNSPECIFIED: (no result)
GI HISTOLOGY: F. UNSPECIFIED: (no result)
GI HISTOLOGY: G. UNSPECIFIED: (no result)
GI HISTOLOGY: H. UNSPECIFIED: (no result)
GI HISTOLOGY: I. UNSPECIFIED: (no result)
GI HISTOLOGY: J. UNSPECIFIED: (no result)
GI HISTOLOGY: K. UNSPECIFIED: (no result)
GI HISTOLOGY: PDF REPORT: (no result)

## 2021-01-12 PROCEDURE — 45385 COLONOSCOPY W/LESION REMOVAL: CPT | Performed by: INTERNAL MEDICINE

## 2021-01-12 PROCEDURE — 45380 COLONOSCOPY AND BIOPSY: CPT | Mod: 59 | Performed by: INTERNAL MEDICINE

## 2021-01-12 PROCEDURE — 88305 TISSUE EXAM BY PATHOLOGIST: CPT | Performed by: INTERNAL MEDICINE

## 2021-01-12 NOTE — SERVICEHPINOTES
Ms. Starkey is here for follow-up, from an upper GI standpoint she is doing well. She's had no issues with chronic cough. Symptoms improved on twice a day Prilosec. She says she'll take it once or twice a day. Her reflux is controlled. There is no dysphagia, odynophagia nausea or vomiting. There is no melena or hematemesis. She also has a history of noncardiac chest pain but she's had no recurrence of her pain. Upper endoscopy showed a hiatal hernia and gastritis. She doesn't smoke. She does have one cocktail in the evening.She also has a history of ulcerative colitis. She's stable on sulfasalazine but it's been over 2 years since her last colonoscopy. Due to her chronic ulcerative colitis she can be at increased risk for dysplasia and colon cancer. We therefore discussed follow-up colonoscopy. She is agreeable but wants to wait until after the holidays. She again is on sulfasalazine. She does takes 2 a day. She's not on folic acid her folate supplement. Sulfasalazine can deplete folate stores are recommended she take folic acid.She is complaining of gas. She passes a lot of gas but says she eats a lot of fruits and vegetables and I suspect that is the primary cause. It also could be related to bacteria. She has lost about 20 pounds, she says that since she moved back in March she's much more active and again does eat healthy. She did have recent blood work done and I will get a copy of those results. Otherwise there is no change in her past medical or past surgical history. She is in no distress. She does not look acutely ill.

## 2021-04-12 ENCOUNTER — LAB (OUTPATIENT)
Dept: LAB | Facility: HOSPITAL | Age: 86
End: 2021-04-12

## 2021-04-12 ENCOUNTER — OFFICE VISIT (OUTPATIENT)
Dept: ONCOLOGY | Facility: CLINIC | Age: 86
End: 2021-04-12

## 2021-04-12 VITALS
HEART RATE: 55 BPM | DIASTOLIC BLOOD PRESSURE: 71 MMHG | HEIGHT: 65 IN | BODY MASS INDEX: 26.74 KG/M2 | RESPIRATION RATE: 18 BRPM | OXYGEN SATURATION: 95 % | TEMPERATURE: 97.1 F | SYSTOLIC BLOOD PRESSURE: 128 MMHG | WEIGHT: 160.5 LBS

## 2021-04-12 DIAGNOSIS — C91.10 CLL (CHRONIC LYMPHOCYTIC LEUKEMIA) (HCC): Primary | ICD-10-CM

## 2021-04-12 DIAGNOSIS — C91.10 CLL (CHRONIC LYMPHOCYTIC LEUKEMIA) (HCC): ICD-10-CM

## 2021-04-12 LAB
BASOPHILS # BLD AUTO: 0.05 10*3/MM3 (ref 0–0.2)
BASOPHILS NFR BLD AUTO: 0.3 % (ref 0–1.5)
DEPRECATED RDW RBC AUTO: 44.9 FL (ref 37–54)
EOSINOPHIL # BLD AUTO: 0.16 10*3/MM3 (ref 0–0.4)
EOSINOPHIL NFR BLD AUTO: 1 % (ref 0.3–6.2)
ERYTHROCYTE [DISTWIDTH] IN BLOOD BY AUTOMATED COUNT: 12.9 % (ref 12.3–15.4)
HCT VFR BLD AUTO: 41.2 % (ref 34–46.6)
HGB BLD-MCNC: 13.4 G/DL (ref 12–15.9)
IMM GRANULOCYTES # BLD AUTO: 0.1 10*3/MM3 (ref 0–0.05)
IMM GRANULOCYTES NFR BLD AUTO: 0.6 % (ref 0–0.5)
LYMPHOCYTES # BLD AUTO: 8.49 10*3/MM3 (ref 0.7–3.1)
LYMPHOCYTES NFR BLD AUTO: 50.7 % (ref 19.6–45.3)
MCH RBC QN AUTO: 30.9 PG (ref 26.6–33)
MCHC RBC AUTO-ENTMCNC: 32.5 G/DL (ref 31.5–35.7)
MCV RBC AUTO: 95.2 FL (ref 79–97)
MONOCYTES # BLD AUTO: 1.43 10*3/MM3 (ref 0.1–0.9)
MONOCYTES NFR BLD AUTO: 8.5 % (ref 5–12)
NEUTROPHILS NFR BLD AUTO: 38.9 % (ref 42.7–76)
NEUTROPHILS NFR BLD AUTO: 6.51 10*3/MM3 (ref 1.7–7)
NRBC BLD AUTO-RTO: 0.2 /100 WBC (ref 0–0.2)
PLATELET # BLD AUTO: 242 10*3/MM3 (ref 140–450)
PMV BLD AUTO: 10.7 FL (ref 6–12)
RBC # BLD AUTO: 4.33 10*6/MM3 (ref 3.77–5.28)
WBC # BLD AUTO: 16.74 10*3/MM3 (ref 3.4–10.8)

## 2021-04-12 PROCEDURE — 99213 OFFICE O/P EST LOW 20 MIN: CPT | Performed by: INTERNAL MEDICINE

## 2021-04-12 PROCEDURE — 36415 COLL VENOUS BLD VENIPUNCTURE: CPT

## 2021-04-12 PROCEDURE — 85025 COMPLETE CBC W/AUTO DIFF WBC: CPT

## 2021-04-12 RX ORDER — FUROSEMIDE 40 MG/1
40 TABLET ORAL DAILY
COMMUNITY

## 2021-04-12 RX ORDER — BENZONATATE 100 MG/1
100 CAPSULE ORAL 3 TIMES DAILY PRN
COMMUNITY

## 2021-04-12 RX ORDER — LEVOTHYROXINE SODIUM 0.15 MG/1
150 TABLET ORAL DAILY
COMMUNITY

## 2021-04-12 NOTE — PROGRESS NOTES
Subjective .     REASON FOR FOLLOWUP:  CLL.                             HISTORY OF PRESENT ILLNESS:  The patient is a 87 y.o. year old female  who is here for follow-up with the above-mentioned history.    Doing well.  No new issues.  Denies fever, chills, drenching night sweats, weight loss, infection since last visit        Past Medical History:   Diagnosis Date   • Arrhythmia    • Arthritis    • Asthma    • CKD (chronic kidney disease)    • CLL (chronic lymphocytic leukemia) (CMS/HCC)     Stage 0, trisomy 12   • GERD (gastroesophageal reflux disease)    • H/O Frequent PACs and PVCs    • H/O Lymphocytosis    • H/O Transient amnesia    • HTN (hypertension)    • Hyperlipidemia    • Hypothyroid    • Lumbar stenosis    • Neuropathy     Legs   • Osteopenia    • Pain     H/O diffuse chest, epigastric and lower abdominal pain   • Seasonal allergies    • Ulcerative colitis (CMS/HCC)        HEMATOLOGIC/ONCOLOGIC HISTORY:  (History from previous dates can be found in the separate document.)    MEDICATIONS    Current Outpatient Medications:   •  Albuterol (PROVENTIL IN), Inhale As Needed., Disp: , Rfl:   •  benzonatate (TESSALON) 100 MG capsule, Take 100 mg by mouth 3 (Three) Times a Day As Needed for Cough., Disp: , Rfl:   •  Cholecalciferol (VITAMIN D-3 PO), Take 2,000 Units by mouth daily., Disp: , Rfl:   •  dextromethorphan-guaifenesin (ROBITUSSIN-DM)  MG/5ML syrup, Take 5 mL by mouth Every 4 (Four) Hours As Needed., Disp: , Rfl:   •  folic acid (FOLVITE) 1 MG tablet, Take 1 mg by mouth daily., Disp: , Rfl:   •  furosemide (LASIX) 40 MG tablet, Take 40 mg by mouth 2 (Two) Times a Day., Disp: , Rfl:   •  levothyroxine (SYNTHROID, LEVOTHROID) 150 MCG tablet, Take 150 mcg by mouth Daily., Disp: , Rfl:   •  Loratadine 10 MG capsule, Take 10 mg by mouth daily., Disp: , Rfl:   •  metoprolol succinate XL (TOPROL-XL) 25 MG 24 hr tablet, TAKE ONE TABLET BY MOUTH ONE TIME DAILY , Disp: 30 tablet, Rfl: 0  •  omeprazole  "(PriLOSEC) 20 MG capsule, Take 20 mg by mouth daily., Disp: , Rfl:   •  rosuvastatin (CRESTOR) 20 MG tablet, Take  by mouth Daily., Disp: , Rfl:   •  sulfaSALAzine (AZULFIDINE) 500 MG tablet, Take 500 mg by mouth 4 (four) times a day., Disp: , Rfl:     ALLERGIES:     Allergies   Allergen Reactions   • Penicillins Anaphylaxis       SOCIAL HISTORY:       Social History     Socioeconomic History   • Marital status:      Spouse name: Not on file   • Number of children: Not on file   • Years of education: Not on file   • Highest education level: Not on file   Tobacco Use   • Smoking status: Former Smoker     Packs/day: 1.50     Years: 40.00     Pack years: 60.00     Types: Cigarettes     Quit date:      Years since quittin.3   • Smokeless tobacco: Former User   Substance and Sexual Activity   • Alcohol use: Yes     Alcohol/week: 7.0 standard drinks     Types: 7 Glasses of wine per week     Comment: 1 drink daily   • Drug use: Never   • Sexual activity: Defer         FAMILY HISTORY:  Cancer-related family history includes Colon cancer in her mother.    REVIEW OF SYSTEMS:  Review of Systems   Constitutional: Negative for activity change.   HENT: Negative for nosebleeds and trouble swallowing.    Respiratory: Negative for shortness of breath and wheezing.    Cardiovascular: Negative for chest pain and palpitations.   Gastrointestinal: Negative for constipation, diarrhea and nausea.   Genitourinary: Negative for dysuria and hematuria.   Musculoskeletal: Negative for arthralgias and myalgias.   Skin: Negative for rash and wound.   Neurological: Negative for seizures and syncope.   Hematological: Negative for adenopathy. Does not bruise/bleed easily.   Psychiatric/Behavioral: Negative for confusion.      Objective    Vitals:    21 1130   BP: 128/71   Pulse: 55   Resp: 18   Temp: 97.1 °F (36.2 °C)   TempSrc: Temporal   SpO2: 95%   Weight: 72.8 kg (160 lb 8 oz)   Height: 164.6 cm (64.8\")   PainSc: 0-No " pain     Current Status 4/12/2021   ECOG score 1      PHYSICAL EXAM:        CONSTITUTIONAL:  Vital signs reviewed.  No distress, looks comfortable.  EYES:  Conjunctiva and lids unremarkable.  PERRLA  EARS,NOSE,MOUTH,THROAT:  Ears and nose appear unremarkable.  Lips, teeth, gums appear unremarkable.  RESPIRATORY:  Normal respiratory effort.  Lungs clear to auscultation bilaterally.  CARDIOVASCULAR:  Normal S1, S2.  No murmurs rubs or gallops.  No significant lower extremity edema.  GASTROINTESTINAL: Abdomen appears unremarkable.  Nontender.  No hepatomegaly.  No splenomegaly.  LYMPHATIC:  No cervical, supraclavicular, axillary lymphadenopathy.  SKIN:  Warm.  No rashes.  PSYCHIATRIC:  Normal judgment and insight.  Normal mood and affect.          RECENT LABS:        WBC   Date/Time Value Ref Range Status   04/12/2021 11:26 AM 16.74 (H) 3.40 - 10.80 10*3/mm3 Final     Hemoglobin   Date/Time Value Ref Range Status   04/12/2021 11:26 AM 13.4 12.0 - 15.9 g/dL Final     Platelets   Date/Time Value Ref Range Status   04/12/2021 11:26  140 - 450 10*3/mm3 Final       Assessment/Plan     ASSESSMENT:  CLL (chronic lymphocytic leukemia) (CMS/[facility])  - CBC & Differential      *CLL, stage 0, trisomy 12 (prognostic significance not clear).  Has not required any treatment.  · WBC 9 in 2012, 11 in 2013.   · 13--15 [id number].  · Continues to have no lymphadenopathy or splenomegaly.  CLL remains stable.    *Leukocytosis, due to lymphocytosis.  WBC 16.7, from 15.3, from 18.6, from 22.2    *Lymphocytosis.  Due to CLL.  Stable.  Monitor.  ALC 8490, from 9090    *Macrocytosis.  MCV 95.2    *Non-drenching, intermittent, Night sweats.    · This does not appear to be due to CLL since everything else looks stable/asymptomatic.   · Night sweats are not drenching.    *Overweight.  Being overweight is a risk factor for malignancies.  Ideally, lose weight  Body mass index is 26.87 kg/m².  BMI 25 to <30 is overweight  BMI 30 to <35 is class 1  obesity  BMI 35 to <40 is class 2 obesity  BMI 40 or higher is class 3 obesity     PLAN:  M.D. CBC 6 months  (I have previously offered annual follow-up but she prefers 6 months)

## 2021-04-13 ENCOUNTER — APPOINTMENT (OUTPATIENT)
Dept: WOMENS IMAGING | Facility: HOSPITAL | Age: 86
End: 2021-04-13

## 2021-04-13 PROCEDURE — 77080 DXA BONE DENSITY AXIAL: CPT | Performed by: RADIOLOGY

## 2021-07-16 ENCOUNTER — OFFICE (OUTPATIENT)
Dept: URBAN - METROPOLITAN AREA CLINIC 75 | Facility: CLINIC | Age: 86
End: 2021-07-16

## 2021-07-16 VITALS — SYSTOLIC BLOOD PRESSURE: 150 MMHG | HEIGHT: 64 IN | WEIGHT: 161 LBS | DIASTOLIC BLOOD PRESSURE: 90 MMHG

## 2021-07-16 DIAGNOSIS — K57.30 DIVERTICULOSIS OF LARGE INTESTINE WITHOUT PERFORATION OR ABS: ICD-10-CM

## 2021-07-16 DIAGNOSIS — R07.89 OTHER CHEST PAIN: ICD-10-CM

## 2021-07-16 DIAGNOSIS — R05 COUGH: ICD-10-CM

## 2021-07-16 DIAGNOSIS — K29.70 GASTRITIS, UNSPECIFIED, WITHOUT BLEEDING: ICD-10-CM

## 2021-07-16 DIAGNOSIS — K51.90 ULCERATIVE COLITIS, UNSPECIFIED, WITHOUT COMPLICATIONS: ICD-10-CM

## 2021-07-16 DIAGNOSIS — K44.9 DIAPHRAGMATIC HERNIA WITHOUT OBSTRUCTION OR GANGRENE: ICD-10-CM

## 2021-07-16 DIAGNOSIS — K31.7 POLYP OF STOMACH AND DUODENUM: ICD-10-CM

## 2021-07-16 DIAGNOSIS — K21.9 GASTRO-ESOPHAGEAL REFLUX DISEASE WITHOUT ESOPHAGITIS: ICD-10-CM

## 2021-07-16 PROBLEM — K63.5 POLYP OF COLON: Status: ACTIVE | Noted: 2021-01-12

## 2021-07-16 PROCEDURE — 99213 OFFICE O/P EST LOW 20 MIN: CPT | Performed by: INTERNAL MEDICINE

## 2021-09-27 ENCOUNTER — APPOINTMENT (OUTPATIENT)
Dept: LAB | Facility: HOSPITAL | Age: 86
End: 2021-09-27

## 2021-10-27 ENCOUNTER — LAB (OUTPATIENT)
Dept: LAB | Facility: HOSPITAL | Age: 86
End: 2021-10-27

## 2021-10-27 ENCOUNTER — OFFICE VISIT (OUTPATIENT)
Dept: ONCOLOGY | Facility: CLINIC | Age: 86
End: 2021-10-27

## 2021-10-27 VITALS
DIASTOLIC BLOOD PRESSURE: 69 MMHG | OXYGEN SATURATION: 93 % | RESPIRATION RATE: 18 BRPM | WEIGHT: 160.8 LBS | BODY MASS INDEX: 26.79 KG/M2 | HEART RATE: 59 BPM | SYSTOLIC BLOOD PRESSURE: 115 MMHG | HEIGHT: 65 IN | TEMPERATURE: 97.1 F

## 2021-10-27 DIAGNOSIS — C91.10 CLL (CHRONIC LYMPHOCYTIC LEUKEMIA) (HCC): Primary | ICD-10-CM

## 2021-10-27 LAB
BASOPHILS # BLD AUTO: 0.11 10*3/MM3 (ref 0–0.2)
BASOPHILS NFR BLD AUTO: 0.6 % (ref 0–1.5)
DEPRECATED RDW RBC AUTO: 45.6 FL (ref 37–54)
EOSINOPHIL # BLD AUTO: 0.22 10*3/MM3 (ref 0–0.4)
EOSINOPHIL NFR BLD AUTO: 1.2 % (ref 0.3–6.2)
ERYTHROCYTE [DISTWIDTH] IN BLOOD BY AUTOMATED COUNT: 12.8 % (ref 12.3–15.4)
HCT VFR BLD AUTO: 40.2 % (ref 34–46.6)
HGB BLD-MCNC: 13.3 G/DL (ref 12–15.9)
IMM GRANULOCYTES # BLD AUTO: 0.18 10*3/MM3 (ref 0–0.05)
IMM GRANULOCYTES NFR BLD AUTO: 1 % (ref 0–0.5)
LYMPHOCYTES # BLD AUTO: 9.97 10*3/MM3 (ref 0.7–3.1)
LYMPHOCYTES NFR BLD AUTO: 55.7 % (ref 19.6–45.3)
MCH RBC QN AUTO: 32 PG (ref 26.6–33)
MCHC RBC AUTO-ENTMCNC: 33.1 G/DL (ref 31.5–35.7)
MCV RBC AUTO: 96.9 FL (ref 79–97)
MONOCYTES # BLD AUTO: 1.74 10*3/MM3 (ref 0.1–0.9)
MONOCYTES NFR BLD AUTO: 9.7 % (ref 5–12)
NEUTROPHILS NFR BLD AUTO: 31.8 % (ref 42.7–76)
NEUTROPHILS NFR BLD AUTO: 5.69 10*3/MM3 (ref 1.7–7)
NRBC BLD AUTO-RTO: 0 /100 WBC (ref 0–0.2)
PLATELET # BLD AUTO: 186 10*3/MM3 (ref 140–450)
PMV BLD AUTO: 11 FL (ref 6–12)
RBC # BLD AUTO: 4.15 10*6/MM3 (ref 3.77–5.28)
WBC # BLD AUTO: 17.91 10*3/MM3 (ref 3.4–10.8)

## 2021-10-27 PROCEDURE — 36415 COLL VENOUS BLD VENIPUNCTURE: CPT

## 2021-10-27 PROCEDURE — 99213 OFFICE O/P EST LOW 20 MIN: CPT | Performed by: INTERNAL MEDICINE

## 2021-10-27 PROCEDURE — 85025 COMPLETE CBC W/AUTO DIFF WBC: CPT

## 2021-10-27 RX ORDER — OLMESARTAN MEDOXOMIL 40 MG/1
TABLET ORAL
COMMUNITY

## 2021-10-27 RX ORDER — OLMESARTAN MEDOXOMIL 40 MG/1
TABLET ORAL
COMMUNITY
Start: 2021-08-08 | End: 2022-05-26 | Stop reason: SDUPTHER

## 2021-10-27 RX ORDER — DOXEPIN HYDROCHLORIDE 3 MG/1
TABLET ORAL
COMMUNITY
End: 2022-05-26

## 2021-10-27 RX ORDER — DENOSUMAB 60 MG/ML
1 INJECTION SUBCUTANEOUS
COMMUNITY

## 2021-10-27 RX ORDER — FLUTICASONE PROPIONATE 50 MCG
SPRAY, SUSPENSION (ML) NASAL
COMMUNITY

## 2021-10-27 RX ORDER — METOPROLOL SUCCINATE 50 MG/1
TABLET, EXTENDED RELEASE ORAL
COMMUNITY
Start: 2021-05-13 | End: 2022-05-26 | Stop reason: SDUPTHER

## 2021-10-27 RX ORDER — FUROSEMIDE 40 MG/1
TABLET ORAL
COMMUNITY
End: 2022-05-26

## 2021-10-27 RX ORDER — HYDROCHLOROTHIAZIDE 12.5 MG/1
CAPSULE, GELATIN COATED ORAL
COMMUNITY

## 2021-10-27 RX ORDER — IPRATROPIUM BROMIDE AND ALBUTEROL SULFATE 2.5; .5 MG/3ML; MG/3ML
3 SOLUTION RESPIRATORY (INHALATION) EVERY 6 HOURS SCHEDULED
COMMUNITY

## 2021-10-27 RX ORDER — SULFASALAZINE 500 MG/1
TABLET ORAL
COMMUNITY
End: 2022-05-26 | Stop reason: SDUPTHER

## 2021-10-27 RX ORDER — METOPROLOL SUCCINATE 25 MG/1
TABLET, EXTENDED RELEASE ORAL
COMMUNITY
End: 2022-05-26 | Stop reason: SDUPTHER

## 2021-10-27 RX ORDER — BENZONATATE 100 MG/1
CAPSULE ORAL EVERY 8 HOURS SCHEDULED
COMMUNITY
Start: 2021-05-13

## 2021-10-27 RX ORDER — PREDNISONE 10 MG/1
TABLET ORAL
COMMUNITY
Start: 2021-08-12 | End: 2022-05-26

## 2021-10-27 NOTE — PROGRESS NOTES
Subjective .     REASON FOR FOLLOWUP:  CLL.                             HISTORY OF PRESENT ILLNESS:  The patient is a 87 y.o. year old female  who is here for follow-up with the above-mentioned history.    No new problems.  Denies fever, chills, weight loss, drenching night sweats.  In fact, over the past month she has not had many night sweats at all (she typically has nondrenching night sweats).  No lymphadenopathy or pain    Past Medical History:   Diagnosis Date   • Arrhythmia    • Arthritis    • Asthma    • CKD (chronic kidney disease)    • CLL (chronic lymphocytic leukemia) (HCC)     Stage 0, trisomy 12   • GERD (gastroesophageal reflux disease)    • H/O Frequent PACs and PVCs    • H/O Lymphocytosis    • H/O Transient amnesia    • HTN (hypertension)    • Hyperlipidemia    • Hypothyroid    • Lumbar stenosis    • Neuropathy     Legs   • Osteopenia    • Pain     H/O diffuse chest, epigastric and lower abdominal pain   • Seasonal allergies    • Ulcerative colitis (HCC)        HEMATOLOGIC/ONCOLOGIC HISTORY:  (History from previous dates can be found in the separate document.)    MEDICATIONS    Current Outpatient Medications:   •  Albuterol (PROVENTIL IN), Inhale As Needed., Disp: , Rfl:   •  benzonatate (TESSALON) 100 MG capsule, Every 8 (Eight) Hours., Disp: , Rfl:   •  Cholecalciferol (VITAMIN D-3 PO), Take 2,000 Units by mouth daily., Disp: , Rfl:   •  denosumab (Prolia) 60 MG/ML solution prefilled syringe syringe, 1 mL., Disp: , Rfl:   •  dextromethorphan-guaifenesin (ROBITUSSIN-DM)  MG/5ML syrup, Take 5 mL by mouth Every 4 (Four) Hours As Needed., Disp: , Rfl:   •  Doxepin HCl 3 MG tablet, doxepin 3 mg tablet  TAKE 1 TABLET AT BEDTIME AS NEEDED FOR SLEEP, Disp: , Rfl:   •  fluticasone (Flonase) 50 MCG/ACT nasal spray, Flonase Allergy Relief 50 mcg/actuation nasal spray,suspension  2 puffs each nostril daily., Disp: , Rfl:   •  folic acid (FOLVITE) 1 MG tablet, Take 1 mg by mouth daily., Disp: , Rfl:    •  furosemide (LASIX) 40 MG tablet, at bed time., Disp: , Rfl:   •  hydroCHLOROthiazide (MICROZIDE) 12.5 MG capsule, hydrochlorothiazide 12.5 mg capsule  Take 1 capsule every day by oral route., Disp: , Rfl:   •  ipratropium-albuterol (DUO-NEB) 0.5-2.5 mg/3 ml nebulizer, 3 mL Every 6 (Six) Hours., Disp: , Rfl:   •  levothyroxine (SYNTHROID, LEVOTHROID) 150 MCG tablet, Take 150 mcg by mouth Daily., Disp: , Rfl:   •  Loratadine 10 MG capsule, Take 10 mg by mouth daily., Disp: , Rfl:   •  metoprolol succinate XL (Toprol XL) 25 MG 24 hr tablet, Toprol XL 25 mg tablet,extended release  Take 1 tablet every day by oral route., Disp: , Rfl:   •  metoprolol succinate XL (TOPROL-XL) 25 MG 24 hr tablet, TAKE ONE TABLET BY MOUTH ONE TIME DAILY , Disp: 30 tablet, Rfl: 0  •  metoprolol succinate XL (TOPROL-XL) 50 MG 24 hr tablet, metoprolol succinate ER 50 mg tablet,extended release 24 hr  Take 1 tablet every day by oral route., Disp: , Rfl:   •  olmesartan (BENICAR) 40 MG tablet, olmesartan 40 mg tablet  Take 1 tablet every day by oral route., Disp: , Rfl:   •  olmesartan (BENICAR) 40 MG tablet, , Disp: , Rfl:   •  omeprazole (PriLOSEC) 20 MG capsule, Take 20 mg by mouth daily., Disp: , Rfl:   •  predniSONE (DELTASONE) 10 MG tablet, , Disp: , Rfl:   •  rosuvastatin (CRESTOR) 20 MG tablet, Take  by mouth Daily., Disp: , Rfl:   •  sulfaSALAzine (AZULFIDINE) 500 MG tablet, Take 500 mg by mouth 4 (four) times a day., Disp: , Rfl:   •  sulfaSALAzine (AZULFIDINE) 500 MG tablet, sulfasalazine 500 mg tablet  Take 1 tablet every day by oral route., Disp: , Rfl:   •  benzonatate (TESSALON) 100 MG capsule, Take 100 mg by mouth 3 (Three) Times a Day As Needed for Cough., Disp: , Rfl:   •  furosemide (LASIX) 40 MG tablet, Take 40 mg by mouth 2 (Two) Times a Day., Disp: , Rfl:     ALLERGIES:     Allergies   Allergen Reactions   • Penicillins Anaphylaxis       SOCIAL HISTORY:       Social History     Socioeconomic History   • Marital status:  "   Tobacco Use   • Smoking status: Former Smoker     Packs/day: 1.50     Years: 40.00     Pack years: 60.00     Types: Cigarettes     Quit date:      Years since quittin.8   • Smokeless tobacco: Former User   Substance and Sexual Activity   • Alcohol use: Yes     Alcohol/week: 7.0 standard drinks     Types: 7 Glasses of wine per week     Comment: 1 drink daily   • Drug use: Never   • Sexual activity: Defer         FAMILY HISTORY:  Cancer-related family history includes Colon cancer in her mother.    REVIEW OF SYSTEMS:  Review of Systems   Constitutional: Negative for activity change.   HENT: Negative for nosebleeds and trouble swallowing.    Respiratory: Negative for shortness of breath and wheezing.    Cardiovascular: Negative for chest pain and palpitations.   Gastrointestinal: Negative for constipation, diarrhea and nausea.   Genitourinary: Negative for dysuria and hematuria.   Musculoskeletal: Negative for arthralgias and myalgias.   Skin: Negative for rash and wound.   Neurological: Negative for seizures and syncope.   Hematological: Negative for adenopathy. Does not bruise/bleed easily.   Psychiatric/Behavioral: Negative for confusion.      Objective    Vitals:    10/27/21 0850   BP: 115/69   Pulse: 59   Resp: 18   Temp: 97.1 °F (36.2 °C)   TempSrc: Temporal   SpO2: 93%   Weight: 72.9 kg (160 lb 12.8 oz)   Height: 164.6 cm (64.8\")   PainSc: 0-No pain     Current Status 10/27/2021   ECOG score 0      PHYSICAL EXAM:        CONSTITUTIONAL:  Vital signs reviewed.  No distress, looks comfortable.  EYES:  Conjunctiva and lids unremarkable.  PERRLA  EARS,NOSE,MOUTH,THROAT:  Ears and nose appear unremarkable.  Lips, teeth, gums appear unremarkable.  RESPIRATORY:  Normal respiratory effort.  Lungs clear to auscultation bilaterally.  CARDIOVASCULAR:  Normal S1, S2.  No murmurs rubs or gallops.  No significant lower extremity edema.  GASTROINTESTINAL: Abdomen appears unremarkable.  Nontender.  No " hepatomegaly.  No splenomegaly.  LYMPHATIC:  No cervical, supraclavicular, axillary lymphadenopathy.  SKIN:  Warm.  No rashes.  PSYCHIATRIC:  Normal judgment and insight.  Normal mood and affect.        RECENT LABS:        WBC   Date/Time Value Ref Range Status   10/27/2021 08:39 AM 17.91 (H) 3.40 - 10.80 10*3/mm3 Final     Hemoglobin   Date/Time Value Ref Range Status   10/27/2021 08:39 AM 13.3 12.0 - 15.9 g/dL Final     Platelets   Date/Time Value Ref Range Status   10/27/2021 08:39  140 - 450 10*3/mm3 Final       Assessment/Plan     ASSESSMENT:  CLL (chronic lymphocytic leukemia) ([facility])  - CBC & Differential      *CLL, stage 0, trisomy 12 (prognostic significance not clear).  Has not required any treatment.  · WBC 9 in 2012, 11 in 2013.   · 13--15 [id number].  · Continues to have no lymphadenopathy or splenomegaly.  CLL remains stable.  · Appears stable.    *Leukocytosis, due to lymphocytosis.  WBC 17.9, from 16.7, from 15.3, from 18.6, from 22.2    *Lymphocytosis.  Due to CLL.  Stable.  Monitor.  ALC 9970, from 8490, from 9090    *Macrocytosis.  MCV 96.9, from 95.2    *Non-drenching, intermittent, Night sweats.    · This does not appear to be due to CLL since everything else looks stable/asymptomatic.   · 10/27/2021 night sweats are not drenching, and almost none for the past month    *Overweight.  Being overweight is a risk factor for malignancies.  Ideally, lose weight  Body mass index is 26.92 kg/m².  BMI 25 to <30 is overweight  BMI 30 to <35 is class 1 obesity  BMI 35 to <40 is class 2 obesity  BMI 40 or higher is class 3 obesity   Remains slightly overweight.  Ideally, lose weight    PLAN:  M.D. CBC 6 months  (I have previously offered annual follow-up but she prefers 6 months)

## 2022-01-07 ENCOUNTER — APPOINTMENT (OUTPATIENT)
Dept: WOMENS IMAGING | Facility: HOSPITAL | Age: 87
End: 2022-01-07

## 2022-01-07 PROCEDURE — 77067 SCR MAMMO BI INCL CAD: CPT | Performed by: RADIOLOGY

## 2022-01-07 PROCEDURE — 77063 BREAST TOMOSYNTHESIS BI: CPT | Performed by: RADIOLOGY

## 2022-03-22 ENCOUNTER — HOSPITAL ENCOUNTER (OUTPATIENT)
Dept: GENERAL RADIOLOGY | Facility: HOSPITAL | Age: 87
Discharge: HOME | End: 2022-03-22
Admitting: INTERNAL MEDICINE

## 2022-03-22 DIAGNOSIS — J84.9 INTERSTITIAL LUNG DISEASE: ICD-10-CM

## 2022-03-22 PROCEDURE — 71046 X-RAY EXAM CHEST 2 VIEWS: CPT

## 2022-05-02 ENCOUNTER — OFFICE VISIT (OUTPATIENT)
Dept: ONCOLOGY | Facility: CLINIC | Age: 87
End: 2022-05-02

## 2022-05-02 ENCOUNTER — LAB (OUTPATIENT)
Dept: LAB | Facility: HOSPITAL | Age: 87
End: 2022-05-02

## 2022-05-02 VITALS
WEIGHT: 166.6 LBS | TEMPERATURE: 97.1 F | OXYGEN SATURATION: 94 % | BODY MASS INDEX: 27.76 KG/M2 | SYSTOLIC BLOOD PRESSURE: 139 MMHG | HEART RATE: 70 BPM | HEIGHT: 65 IN | DIASTOLIC BLOOD PRESSURE: 63 MMHG | RESPIRATION RATE: 16 BRPM

## 2022-05-02 DIAGNOSIS — C91.10 CLL (CHRONIC LYMPHOCYTIC LEUKEMIA): Primary | ICD-10-CM

## 2022-05-02 DIAGNOSIS — C91.10 CLL (CHRONIC LYMPHOCYTIC LEUKEMIA): ICD-10-CM

## 2022-05-02 LAB
BASOPHILS # BLD AUTO: 0.07 10*3/MM3 (ref 0–0.2)
BASOPHILS NFR BLD AUTO: 0.4 % (ref 0–1.5)
DEPRECATED RDW RBC AUTO: 42.3 FL (ref 37–54)
EOSINOPHIL # BLD AUTO: 0.14 10*3/MM3 (ref 0–0.4)
EOSINOPHIL NFR BLD AUTO: 0.8 % (ref 0.3–6.2)
ERYTHROCYTE [DISTWIDTH] IN BLOOD BY AUTOMATED COUNT: 12.1 % (ref 12.3–15.4)
HCT VFR BLD AUTO: 38.4 % (ref 34–46.6)
HGB BLD-MCNC: 12.5 G/DL (ref 12–15.9)
IMM GRANULOCYTES # BLD AUTO: 0.1 10*3/MM3 (ref 0–0.05)
IMM GRANULOCYTES NFR BLD AUTO: 0.6 % (ref 0–0.5)
LYMPHOCYTES # BLD AUTO: 10.06 10*3/MM3 (ref 0.7–3.1)
LYMPHOCYTES NFR BLD AUTO: 57.5 % (ref 19.6–45.3)
MCH RBC QN AUTO: 31.4 PG (ref 26.6–33)
MCHC RBC AUTO-ENTMCNC: 32.6 G/DL (ref 31.5–35.7)
MCV RBC AUTO: 96.5 FL (ref 79–97)
MONOCYTES # BLD AUTO: 1.7 10*3/MM3 (ref 0.1–0.9)
MONOCYTES NFR BLD AUTO: 9.7 % (ref 5–12)
NEUTROPHILS NFR BLD AUTO: 31 % (ref 42.7–76)
NEUTROPHILS NFR BLD AUTO: 5.44 10*3/MM3 (ref 1.7–7)
NRBC BLD AUTO-RTO: 0.1 /100 WBC (ref 0–0.2)
PLATELET # BLD AUTO: 209 10*3/MM3 (ref 140–450)
PMV BLD AUTO: 10.6 FL (ref 6–12)
RBC # BLD AUTO: 3.98 10*6/MM3 (ref 3.77–5.28)
WBC NRBC COR # BLD: 17.51 10*3/MM3 (ref 3.4–10.8)

## 2022-05-02 PROCEDURE — 36415 COLL VENOUS BLD VENIPUNCTURE: CPT

## 2022-05-02 PROCEDURE — 99213 OFFICE O/P EST LOW 20 MIN: CPT | Performed by: INTERNAL MEDICINE

## 2022-05-02 PROCEDURE — 85025 COMPLETE CBC W/AUTO DIFF WBC: CPT

## 2022-05-02 RX ORDER — TRAZODONE HYDROCHLORIDE 50 MG/1
TABLET ORAL
COMMUNITY
End: 2022-05-26

## 2022-05-02 RX ORDER — LORATADINE 10 MG/1
10 TABLET ORAL DAILY
COMMUNITY
Start: 2022-02-01

## 2022-05-02 RX ORDER — CODEINE PHOSPHATE AND GUAIFENESIN 10; 100 MG/5ML; MG/5ML
SOLUTION ORAL
COMMUNITY
Start: 2022-03-22

## 2022-05-02 RX ORDER — LEVOTHYROXINE SODIUM 0.07 MG/1
TABLET ORAL
COMMUNITY
Start: 2022-04-29 | End: 2022-05-26 | Stop reason: SDUPTHER

## 2022-05-02 NOTE — PROGRESS NOTES
Subjective .     REASON FOR FOLLOWUP:  CLL.                             HISTORY OF PRESENT ILLNESS:  The patient is a 88 y.o. year old female  who is here for follow-up with the above-mentioned history.    No new problems.  Feeling fine.  No infection since last visit.  No pain.  No fever, chills, weight loss, night sweats    Past Medical History:   Diagnosis Date   • Arrhythmia    • Arthritis    • Asthma    • CKD (chronic kidney disease)    • CLL (chronic lymphocytic leukemia) (HCC)     Stage 0, trisomy 12   • GERD (gastroesophageal reflux disease)    • H/O Frequent PACs and PVCs    • H/O Lymphocytosis    • H/O Transient amnesia    • HTN (hypertension)    • Hyperlipidemia    • Hypothyroid    • Lumbar stenosis    • Neuropathy     Legs   • Osteopenia    • Pain     H/O diffuse chest, epigastric and lower abdominal pain   • Seasonal allergies    • Ulcerative colitis (HCC)        HEMATOLOGIC/ONCOLOGIC HISTORY:  (History from previous dates can be found in the separate document.)    MEDICATIONS    Current Outpatient Medications:   •  Albuterol (PROVENTIL IN), Inhale As Needed., Disp: , Rfl:   •  benzonatate (TESSALON) 100 MG capsule, Every 8 (Eight) Hours., Disp: , Rfl:   •  Cholecalciferol (VITAMIN D-3 PO), Take 2,000 Units by mouth daily., Disp: , Rfl:   •  denosumab (Prolia) 60 MG/ML solution prefilled syringe syringe, 1 mL., Disp: , Rfl:   •  dextromethorphan-guaifenesin (ROBITUSSIN-DM)  MG/5ML syrup, Take 5 mL by mouth Every 4 (Four) Hours As Needed., Disp: , Rfl:   •  Doxepin HCl 3 MG tablet, doxepin 3 mg tablet  TAKE 1 TABLET AT BEDTIME AS NEEDED FOR SLEEP, Disp: , Rfl:   •  fluticasone (FLONASE) 50 MCG/ACT nasal spray, Flonase Allergy Relief 50 mcg/actuation nasal spray,suspension  2 puffs each nostril daily., Disp: , Rfl:   •  furosemide (LASIX) 40 MG tablet, Take 40 mg by mouth 2 (Two) Times a Day., Disp: , Rfl:   •  guaiFENesin-codeine (ROMILAR-AC) 100-10 MG/5ML syrup, TAKE 10ML BY MOUTH 6 TIMES A DAY,  Disp: , Rfl:   •  hydroCHLOROthiazide (MICROZIDE) 12.5 MG capsule, hydrochlorothiazide 12.5 mg capsule  Take 1 capsule every day by oral route., Disp: , Rfl:   •  ipratropium-albuterol (DUO-NEB) 0.5-2.5 mg/3 ml nebulizer, 3 mL Every 6 (Six) Hours., Disp: , Rfl:   •  levothyroxine (SYNTHROID, LEVOTHROID) 150 MCG tablet, Take 150 mcg by mouth Daily., Disp: , Rfl:   •  levothyroxine (SYNTHROID, LEVOTHROID) 75 MCG tablet, , Disp: , Rfl:   •  loratadine (CLARITIN) 10 MG tablet, Take 10 mg by mouth Daily., Disp: , Rfl:   •  Loratadine 10 MG capsule, Take 10 mg by mouth daily., Disp: , Rfl:   •  metoprolol succinate XL (TOPROL-XL) 25 MG 24 hr tablet, TAKE ONE TABLET BY MOUTH ONE TIME DAILY , Disp: 30 tablet, Rfl: 0  •  metoprolol succinate XL (TOPROL-XL) 25 MG 24 hr tablet, Toprol XL 25 mg tablet,extended release  Take 1 tablet every day by oral route., Disp: , Rfl:   •  metoprolol succinate XL (TOPROL-XL) 50 MG 24 hr tablet, metoprolol succinate ER 50 mg tablet,extended release 24 hr  Take 1 tablet every day by oral route., Disp: , Rfl:   •  olmesartan (BENICAR) 40 MG tablet, , Disp: , Rfl:   •  omeprazole (PriLOSEC) 20 MG capsule, Take 20 mg by mouth daily., Disp: , Rfl:   •  rosuvastatin (CRESTOR) 20 MG tablet, Take  by mouth Daily., Disp: , Rfl:   •  sulfaSALAzine (AZULFIDINE) 500 MG tablet, sulfasalazine 500 mg tablet  Take 1 tablet every day by oral route., Disp: , Rfl:   •  traZODone (DESYREL) 50 MG tablet, trazodone 50 mg tablet  Take 1 tablet every day by oral route., Disp: , Rfl:   •  benzonatate (TESSALON) 100 MG capsule, Take 100 mg by mouth 3 (Three) Times a Day As Needed for Cough., Disp: , Rfl:   •  folic acid (FOLVITE) 1 MG tablet, Take 1 mg by mouth daily., Disp: , Rfl:   •  furosemide (LASIX) 40 MG tablet, at bed time., Disp: , Rfl:   •  olmesartan (BENICAR) 40 MG tablet, olmesartan 40 mg tablet  Take 1 tablet every day by oral route., Disp: , Rfl:   •  predniSONE (DELTASONE) 10 MG tablet, , Disp: , Rfl:  "  •  sulfaSALAzine (AZULFIDINE) 500 MG tablet, Take 500 mg by mouth 4 (four) times a day., Disp: , Rfl:     ALLERGIES:     Allergies   Allergen Reactions   • Penicillins Anaphylaxis       SOCIAL HISTORY:       Social History     Socioeconomic History   • Marital status:    Tobacco Use   • Smoking status: Former Smoker     Packs/day: 1.50     Years: 40.00     Pack years: 60.00     Types: Cigarettes     Quit date:      Years since quittin.3   • Smokeless tobacco: Former User   Substance and Sexual Activity   • Alcohol use: Yes     Alcohol/week: 7.0 standard drinks     Types: 7 Glasses of wine per week     Comment: 1 drink daily   • Drug use: Never   • Sexual activity: Defer         FAMILY HISTORY:  Cancer-related family history includes Colon cancer in her mother.    REVIEW OF SYSTEMS:  Review of Systems   Constitutional: Negative for activity change.   HENT: Negative for nosebleeds and trouble swallowing.    Respiratory: Negative for shortness of breath and wheezing.    Cardiovascular: Negative for chest pain and palpitations.   Gastrointestinal: Negative for constipation, diarrhea and nausea.   Genitourinary: Negative for dysuria and hematuria.   Musculoskeletal: Negative for arthralgias and myalgias.   Skin: Negative for rash and wound.   Neurological: Negative for seizures and syncope.   Hematological: Negative for adenopathy. Does not bruise/bleed easily.   Psychiatric/Behavioral: Negative for confusion.      Objective    Vitals:    22 1122   BP: 139/63   Pulse: 70   Resp: 16   Temp: 97.1 °F (36.2 °C)   TempSrc: Temporal   SpO2: 94%   Weight: 75.6 kg (166 lb 9.6 oz)   Height: 164.6 cm (64.8\")   PainSc: 0-No pain     Current Status 10/27/2021   ECOG score 0      PHYSICAL EXAM:        CONSTITUTIONAL:  Vital signs reviewed.  No distress, looks comfortable.  EYES:  Conjunctiva and lids unremarkable.  PERRLA  EARS,NOSE,MOUTH,THROAT:  Ears and nose appear unremarkable.  Lips, teeth, gums appear " unremarkable.  RESPIRATORY:  Normal respiratory effort.  Lungs clear to auscultation bilaterally.  CARDIOVASCULAR:  Normal S1, S2.  No murmurs rubs or gallops.  No significant lower extremity edema.  GASTROINTESTINAL: Abdomen appears unremarkable.  Nontender.  No hepatomegaly.  No splenomegaly.  LYMPHATIC:  No cervical, supraclavicular, axillary lymphadenopathy.  SKIN:  Warm.  No rashes.  PSYCHIATRIC:  Normal judgment and insight.  Normal mood and affect.        RECENT LABS:        WBC   Date/Time Value Ref Range Status   05/02/2022 10:44 AM 17.51 (H) 3.40 - 10.80 10*3/mm3 Final     Hemoglobin   Date/Time Value Ref Range Status   05/02/2022 10:44 AM 12.5 12.0 - 15.9 g/dL Final     Platelets   Date/Time Value Ref Range Status   05/02/2022 10:44  140 - 450 10*3/mm3 Final       Assessment/Plan     ASSESSMENT:  CLL (chronic lymphocytic leukemia) ([facility])  - CBC & Differential      *CLL, stage 0, trisomy 12 (prognostic significance not clear).  Has not required any treatment.  · WBC 9 in 2012, 11 in 2013.   · 13--15 1254-1244.  · Continues to have no lymphadenopathy or splenomegaly.  CLL remains stable.  · Remains stable.    *Leukocytosis, due to lymphocytosis.  WBC 17.5, from 17.9, from 16.7, from 15.3, from 18.6, from 22.2    *Lymphocytosis.  Due to CLL.  Stable.  Monitor.  ALC 10,060, from 9970, from 8490, from 9090    *Macrocytosis.  MCV 96.5, from 96.9, from 95.2    *Non-drenching, intermittent, Night sweats.    · This does not appear to be due to CLL since everything else looks stable/asymptomatic.   · 10/27/2021 night sweats are not drenching, and almost none for the past month  · 5/2/2022: Denies night sweats    *Overweight.  Being overweight is a risk factor for malignancies.  Ideally, lose weight  Body mass index is 27.89 kg/m².  BMI 25 to <30 is overweight  BMI 30 to <35 is class 1 obesity  BMI 35 to <40 is class 2 obesity  BMI 40 or higher is class 3 obesity   Continues to be slightly overweight.  Ideally,  lose weight    PLAN:  M.D. CBC 6 months  (I have previously offered annual follow-up but she prefers 6 months)

## 2022-05-26 ENCOUNTER — TRANSCRIBE ORDERS (OUTPATIENT)
Dept: ADMINISTRATIVE | Facility: HOSPITAL | Age: 87
End: 2022-05-26

## 2022-05-26 ENCOUNTER — HOSPITAL ENCOUNTER (OUTPATIENT)
Dept: INFUSION THERAPY | Facility: HOSPITAL | Age: 87
Setting detail: INFUSION SERIES
Discharge: HOME | End: 2022-05-26

## 2022-05-26 VITALS
SYSTOLIC BLOOD PRESSURE: 108 MMHG | RESPIRATION RATE: 18 BRPM | HEART RATE: 61 BPM | TEMPERATURE: 98.2 F | DIASTOLIC BLOOD PRESSURE: 51 MMHG | OXYGEN SATURATION: 98 %

## 2022-05-26 DIAGNOSIS — U07.1 COVID-19: Primary | ICD-10-CM

## 2022-05-26 DIAGNOSIS — U07.1 CLINICAL DIAGNOSIS OF SEVERE ACUTE RESPIRATORY SYNDROME CORONAVIRUS 2 (SARS-COV-2) DISEASE: Primary | ICD-10-CM

## 2022-05-26 PROCEDURE — 96374 THER/PROPH/DIAG INJ IV PUSH: CPT | Performed by: NURSE PRACTITIONER

## 2022-05-26 PROCEDURE — 25010000002 INJECTION, BEBTELOVIMAB, 175 MG: Performed by: INTERNAL MEDICINE

## 2022-05-26 PROCEDURE — M0222 HC INJECTION BEBTELOVIMAB: HCPCS | Performed by: INTERNAL MEDICINE

## 2022-05-26 RX ORDER — METHYLPREDNISOLONE SODIUM SUCCINATE 125 MG/2ML
125 INJECTION, POWDER, LYOPHILIZED, FOR SOLUTION INTRAMUSCULAR; INTRAVENOUS AS NEEDED
Status: CANCELLED | OUTPATIENT
Start: 2022-05-26

## 2022-05-26 RX ORDER — DIPHENHYDRAMINE HYDROCHLORIDE 50 MG/ML
50 INJECTION INTRAMUSCULAR; INTRAVENOUS ONCE AS NEEDED
OUTPATIENT
Start: 2022-05-26

## 2022-05-26 RX ORDER — EPINEPHRINE 1 MG/ML
0.3 INJECTION, SOLUTION, CONCENTRATE INTRAVENOUS AS NEEDED
Status: CANCELLED | OUTPATIENT
Start: 2022-05-26

## 2022-05-26 RX ORDER — DIPHENHYDRAMINE HYDROCHLORIDE 50 MG/ML
50 INJECTION INTRAMUSCULAR; INTRAVENOUS ONCE AS NEEDED
Status: CANCELLED | OUTPATIENT
Start: 2022-05-26

## 2022-05-26 RX ORDER — SODIUM CHLORIDE 9 MG/ML
30 INJECTION, SOLUTION INTRAVENOUS ONCE
Status: CANCELLED | OUTPATIENT
Start: 2022-05-26 | End: 2022-05-26

## 2022-05-26 RX ORDER — DIPHENHYDRAMINE HCL 50 MG
50 CAPSULE ORAL ONCE AS NEEDED
Status: CANCELLED | OUTPATIENT
Start: 2022-05-26

## 2022-05-26 RX ORDER — DIPHENHYDRAMINE HCL 25 MG
50 CAPSULE ORAL ONCE AS NEEDED
Status: DISCONTINUED | OUTPATIENT
Start: 2022-05-26 | End: 2022-05-28 | Stop reason: HOSPADM

## 2022-05-26 RX ORDER — BEBTELOVIMAB 87.5 MG/ML
175 INJECTION, SOLUTION INTRAVENOUS ONCE
Status: CANCELLED | OUTPATIENT
Start: 2022-05-26 | End: 2022-05-26

## 2022-05-26 RX ORDER — BEBTELOVIMAB 87.5 MG/ML
175 INJECTION, SOLUTION INTRAVENOUS ONCE
Status: COMPLETED | OUTPATIENT
Start: 2022-05-26 | End: 2022-05-26

## 2022-05-26 RX ORDER — DIPHENHYDRAMINE HCL 25 MG
50 CAPSULE ORAL ONCE AS NEEDED
OUTPATIENT
Start: 2022-05-26

## 2022-05-26 RX ORDER — METHYLPREDNISOLONE SODIUM SUCCINATE 125 MG/2ML
125 INJECTION, POWDER, LYOPHILIZED, FOR SOLUTION INTRAMUSCULAR; INTRAVENOUS AS NEEDED
Status: DISCONTINUED | OUTPATIENT
Start: 2022-05-26 | End: 2022-05-28 | Stop reason: HOSPADM

## 2022-05-26 RX ORDER — METHYLPREDNISOLONE SODIUM SUCCINATE 125 MG/2ML
125 INJECTION, POWDER, LYOPHILIZED, FOR SOLUTION INTRAMUSCULAR; INTRAVENOUS AS NEEDED
OUTPATIENT
Start: 2022-05-26

## 2022-05-26 RX ORDER — SODIUM CHLORIDE 9 MG/ML
30 INJECTION, SOLUTION INTRAVENOUS ONCE
Status: DISCONTINUED | OUTPATIENT
Start: 2022-05-26 | End: 2022-05-28 | Stop reason: HOSPADM

## 2022-05-26 RX ORDER — DIPHENHYDRAMINE HYDROCHLORIDE 50 MG/ML
50 INJECTION INTRAMUSCULAR; INTRAVENOUS ONCE AS NEEDED
Status: DISCONTINUED | OUTPATIENT
Start: 2022-05-26 | End: 2022-05-28 | Stop reason: HOSPADM

## 2022-05-26 RX ADMIN — BEBTELOVIMAB 175 MG: 87.5 INJECTION, SOLUTION INTRAVENOUS at 14:52

## 2022-05-26 NOTE — PROGRESS NOTES
Patient provided with Fact Sheet for Patients, Parents and Caregivers Emergency Use Authorization (EUA) of Bebtelovimab for Coronavirus Disease 2019 (COVID-19) form.    Reviewed and patient verbalized understanding.  Appropriate PPE worn during the care of the patient.  Advised patient not to receive Covid vaccine for 90 days.     1558  Tolerated infusion without difficulty.  DC'd per WC.

## 2022-08-24 ENCOUNTER — OFFICE (OUTPATIENT)
Dept: URBAN - METROPOLITAN AREA CLINIC 75 | Facility: CLINIC | Age: 87
End: 2022-08-24

## 2022-08-24 VITALS
HEART RATE: 92 BPM | DIASTOLIC BLOOD PRESSURE: 58 MMHG | OXYGEN SATURATION: 95 % | WEIGHT: 156 LBS | SYSTOLIC BLOOD PRESSURE: 120 MMHG | HEIGHT: 64 IN

## 2022-08-24 DIAGNOSIS — K51.90 ULCERATIVE COLITIS, UNSPECIFIED, WITHOUT COMPLICATIONS: ICD-10-CM

## 2022-08-24 DIAGNOSIS — K21.9 GASTRO-ESOPHAGEAL REFLUX DISEASE WITHOUT ESOPHAGITIS: ICD-10-CM

## 2022-08-24 DIAGNOSIS — K44.9 DIAPHRAGMATIC HERNIA WITHOUT OBSTRUCTION OR GANGRENE: ICD-10-CM

## 2022-08-24 PROCEDURE — 99214 OFFICE O/P EST MOD 30 MIN: CPT | Performed by: NURSE PRACTITIONER

## 2022-11-07 ENCOUNTER — APPOINTMENT (OUTPATIENT)
Dept: LAB | Facility: HOSPITAL | Age: 87
End: 2022-11-07

## 2022-11-15 ENCOUNTER — TRANSCRIBE ORDERS (OUTPATIENT)
Dept: ADMINISTRATIVE | Facility: HOSPITAL | Age: 87
End: 2022-11-15

## 2022-11-15 DIAGNOSIS — M54.17 LUMBOSACRAL RADICULOPATHY: Primary | ICD-10-CM

## 2022-11-21 ENCOUNTER — OFFICE VISIT (OUTPATIENT)
Dept: ONCOLOGY | Facility: CLINIC | Age: 87
End: 2022-11-21

## 2022-11-21 ENCOUNTER — LAB (OUTPATIENT)
Dept: LAB | Facility: HOSPITAL | Age: 87
End: 2022-11-21

## 2022-11-21 VITALS
OXYGEN SATURATION: 96 % | BODY MASS INDEX: 26.39 KG/M2 | HEIGHT: 65 IN | DIASTOLIC BLOOD PRESSURE: 84 MMHG | HEART RATE: 85 BPM | WEIGHT: 158.4 LBS | RESPIRATION RATE: 18 BRPM | TEMPERATURE: 98.6 F | SYSTOLIC BLOOD PRESSURE: 138 MMHG

## 2022-11-21 DIAGNOSIS — C91.10 CLL (CHRONIC LYMPHOCYTIC LEUKEMIA): Primary | ICD-10-CM

## 2022-11-21 DIAGNOSIS — C91.10 CLL (CHRONIC LYMPHOCYTIC LEUKEMIA): ICD-10-CM

## 2022-11-21 LAB
BASOPHILS # BLD AUTO: 0.05 10*3/MM3 (ref 0–0.2)
BASOPHILS NFR BLD AUTO: 0.3 % (ref 0–1.5)
DEPRECATED RDW RBC AUTO: 45.3 FL (ref 37–54)
EOSINOPHIL # BLD AUTO: 0.13 10*3/MM3 (ref 0–0.4)
EOSINOPHIL NFR BLD AUTO: 0.7 % (ref 0.3–6.2)
ERYTHROCYTE [DISTWIDTH] IN BLOOD BY AUTOMATED COUNT: 12.9 % (ref 12.3–15.4)
HCT VFR BLD AUTO: 38.4 % (ref 34–46.6)
HGB BLD-MCNC: 12.4 G/DL (ref 12–15.9)
IMM GRANULOCYTES # BLD AUTO: 0.08 10*3/MM3 (ref 0–0.05)
IMM GRANULOCYTES NFR BLD AUTO: 0.4 % (ref 0–0.5)
LYMPHOCYTES # BLD AUTO: 9.72 10*3/MM3 (ref 0.7–3.1)
LYMPHOCYTES NFR BLD AUTO: 51.5 % (ref 19.6–45.3)
MCH RBC QN AUTO: 31.1 PG (ref 26.6–33)
MCHC RBC AUTO-ENTMCNC: 32.3 G/DL (ref 31.5–35.7)
MCV RBC AUTO: 96.2 FL (ref 79–97)
MONOCYTES # BLD AUTO: 1.55 10*3/MM3 (ref 0.1–0.9)
MONOCYTES NFR BLD AUTO: 8.2 % (ref 5–12)
NEUTROPHILS NFR BLD AUTO: 38.9 % (ref 42.7–76)
NEUTROPHILS NFR BLD AUTO: 7.36 10*3/MM3 (ref 1.7–7)
NRBC BLD AUTO-RTO: 0 /100 WBC (ref 0–0.2)
PLATELET # BLD AUTO: 194 10*3/MM3 (ref 140–450)
PMV BLD AUTO: 11.1 FL (ref 6–12)
RBC # BLD AUTO: 3.99 10*6/MM3 (ref 3.77–5.28)
WBC NRBC COR # BLD: 18.89 10*3/MM3 (ref 3.4–10.8)

## 2022-11-21 PROCEDURE — 36415 COLL VENOUS BLD VENIPUNCTURE: CPT

## 2022-11-21 PROCEDURE — 85025 COMPLETE CBC W/AUTO DIFF WBC: CPT

## 2022-11-21 PROCEDURE — 99213 OFFICE O/P EST LOW 20 MIN: CPT | Performed by: INTERNAL MEDICINE

## 2022-11-21 RX ORDER — MULTIVIT-MIN/IRON/FOLIC ACID/K 18-600-40
CAPSULE ORAL
COMMUNITY

## 2022-11-21 RX ORDER — ALLOPURINOL 100 MG/1
TABLET ORAL
COMMUNITY

## 2022-11-21 RX ORDER — LIDOCAINE 50 MG/G
PATCH TOPICAL
COMMUNITY

## 2022-11-21 RX ORDER — L-METHYLFOLATE-ALGAE-VIT B12-B6 CAP 3-90.314-2-35 MG 3-90.314-2-35 MG
CAP ORAL EVERY 12 HOURS SCHEDULED
COMMUNITY

## 2022-11-21 RX ORDER — LEVOTHYROXINE SODIUM 0.07 MG/1
75 TABLET ORAL DAILY
COMMUNITY
Start: 2022-11-01

## 2022-11-21 NOTE — PROGRESS NOTES
Subjective .     REASON FOR FOLLOWUP:  CLL.                             HISTORY OF PRESENT ILLNESS:  The patient is a 88 y.o. year old female  who is here for follow-up with the above-mentioned history.    No new problems.  No fever, chills, weight loss, night sweats    Past Medical History:   Diagnosis Date   • Arrhythmia    • Arthritis    • Asthma    • CKD (chronic kidney disease)    • CLL (chronic lymphocytic leukemia) (HCC)     Stage 0, trisomy 12   • GERD (gastroesophageal reflux disease)    • H/O Frequent PACs and PVCs    • H/O Lymphocytosis    • H/O Transient amnesia    • HTN (hypertension)    • Hyperlipidemia    • Hypothyroid    • Lumbar stenosis    • Neuropathy     Legs   • Osteopenia    • Pain     H/O diffuse chest, epigastric and lower abdominal pain   • Seasonal allergies    • Ulcerative colitis (HCC)        HEMATOLOGIC/ONCOLOGIC HISTORY:  (History from previous dates can be found in the separate document.)    MEDICATIONS    Current Outpatient Medications:   •  Albuterol (PROVENTIL IN), Inhale As Needed., Disp: , Rfl:   •  allopurinol (ZYLOPRIM) 100 MG tablet, allopurinol 100 mg tablet  Take 1 tablet every day by oral route., Disp: , Rfl:   •  Azilsartan Medoxomil 40 MG tablet, azilsartan medoxomil 40 mg tablet  TAKE 1/2 TABLET (20 MG) BY ORAL ROUTE ONCE DAILY, Disp: , Rfl:   •  benzonatate (TESSALON) 100 MG capsule, Take 100 mg by mouth 3 (Three) Times a Day As Needed for Cough., Disp: , Rfl:   •  benzonatate (TESSALON) 100 MG capsule, Every 8 (Eight) Hours., Disp: , Rfl:   •  Cholecalciferol (Vitamin D) 50 MCG (2000 UT) capsule, Vitamin D  2,000u, Disp: , Rfl:   •  Cholecalciferol (VITAMIN D-3 PO), Take 2,000 Units by mouth daily., Disp: , Rfl:   •  denosumab (Prolia) 60 MG/ML solution prefilled syringe syringe, 1 mL., Disp: , Rfl:   •  dextromethorphan-guaifenesin (ROBITUSSIN-DM)  MG/5ML syrup, Take 5 mL by mouth Every 4 (Four) Hours As Needed., Disp: , Rfl:   •  fluticasone (FLONASE) 50  MCG/ACT nasal spray, Flonase Allergy Relief 50 mcg/actuation nasal spray,suspension  2 puffs each nostril daily., Disp: , Rfl:   •  folic acid (FOLVITE) 1 MG tablet, Take 1 mg by mouth daily., Disp: , Rfl:   •  furosemide (LASIX) 40 MG tablet, Take 40 mg by mouth Daily., Disp: , Rfl:   •  guaiFENesin-codeine (ROMILAR-AC) 100-10 MG/5ML syrup, TAKE 10ML BY MOUTH 6 TIMES A DAY, Disp: , Rfl:   •  hydroCHLOROthiazide (MICROZIDE) 12.5 MG capsule, hydrochlorothiazide 12.5 mg capsule  Take 1 capsule every day by oral route., Disp: , Rfl:   •  ipratropium-albuterol (DUO-NEB) 0.5-2.5 mg/3 ml nebulizer, 3 mL Every 6 (Six) Hours., Disp: , Rfl:   •  l-methylfolate-algae-B6-B12 (METANX) 3-90.314-2-35 MG capsule capsule, Every 12 (Twelve) Hours., Disp: , Rfl:   •  levothyroxine (SYNTHROID, LEVOTHROID) 150 MCG tablet, Take 150 mcg by mouth Daily., Disp: , Rfl:   •  levothyroxine (SYNTHROID, LEVOTHROID) 75 MCG tablet, Take 75 mcg by mouth Daily., Disp: , Rfl:   •  lidocaine (LIDODERM) 5 %, Lidoderm 5 % topical patch  APPLY 1-2 PATCH BY TRANSDERMAL ROUTE ONCE DAILY (MAY WEAR UP TO 12HOURS.), Disp: , Rfl:   •  loratadine (CLARITIN) 10 MG tablet, Take 10 mg by mouth Daily., Disp: , Rfl:   •  metoprolol succinate XL (TOPROL-XL) 25 MG 24 hr tablet, TAKE ONE TABLET BY MOUTH ONE TIME DAILY , Disp: 30 tablet, Rfl: 0  •  olmesartan (BENICAR) 40 MG tablet, olmesartan 40 mg tablet  Take 1 tablet every day by oral route., Disp: , Rfl:   •  omeprazole (PriLOSEC) 20 MG capsule, Take 20 mg by mouth daily., Disp: , Rfl:   •  rosuvastatin (CRESTOR) 20 MG tablet, Take  by mouth Daily., Disp: , Rfl:   •  sulfaSALAzine (AZULFIDINE) 500 MG tablet, Take 500 mg by mouth 4 (four) times a day., Disp: , Rfl:     ALLERGIES:     Allergies   Allergen Reactions   • Penicillins Anaphylaxis       SOCIAL HISTORY:       Social History     Socioeconomic History   • Marital status:    Tobacco Use   • Smoking status: Former     Packs/day: 1.50     Years: 40.00  "    Pack years: 60.00     Types: Cigarettes     Quit date:      Years since quittin.9   • Smokeless tobacco: Former   Substance and Sexual Activity   • Alcohol use: Yes     Alcohol/week: 7.0 standard drinks     Types: 7 Glasses of wine per week     Comment: 1 drink daily   • Drug use: Never   • Sexual activity: Defer         FAMILY HISTORY:  Cancer-related family history includes Colon cancer in her mother.    REVIEW OF SYSTEMS:  Review of Systems   Constitutional: Negative for activity change.   HENT: Negative for nosebleeds and trouble swallowing.    Respiratory: Negative for shortness of breath and wheezing.    Cardiovascular: Negative for chest pain and palpitations.   Gastrointestinal: Negative for constipation, diarrhea and nausea.   Genitourinary: Negative for dysuria and hematuria.   Musculoskeletal: Negative for arthralgias and myalgias.   Skin: Negative for rash and wound.   Neurological: Negative for seizures and syncope.   Hematological: Negative for adenopathy. Does not bruise/bleed easily.   Psychiatric/Behavioral: Negative for confusion.      Objective    Vitals:    22 1256   BP: 138/84   Pulse: 85   Resp: 18   Temp: 98.6 °F (37 °C)   TempSrc: Temporal   SpO2: 96%   Weight: 71.8 kg (158 lb 6.4 oz)   Height: 164.6 cm (64.8\")   PainSc: 0-No pain     Current Status 2022   ECOG score 1      PHYSICAL EXAM:        CONSTITUTIONAL:  Vital signs reviewed.  No distress, looks comfortable.  EYES:  Conjunctiva and lids unremarkable.  PERRLA  EARS,NOSE,MOUTH,THROAT:  Ears and nose appear unremarkable.  Lips, teeth, gums appear unremarkable.  RESPIRATORY:  Normal respiratory effort.  Lungs clear to auscultation bilaterally.  CARDIOVASCULAR:  Normal S1, S2.  No murmurs rubs or gallops.  No significant lower extremity edema.  GASTROINTESTINAL: Abdomen appears unremarkable.  Nontender.  No hepatomegaly.  No splenomegaly.  LYMPHATIC:  No cervical, supraclavicular, axillary lymphadenopathy.  SKIN:  " Warm.  No rashes.  PSYCHIATRIC:  Normal judgment and insight.  Normal mood and affect.          RECENT LABS:        WBC   Date/Time Value Ref Range Status   11/21/2022 12:32 PM 18.89 (H) 3.40 - 10.80 10*3/mm3 Final     Hemoglobin   Date/Time Value Ref Range Status   11/21/2022 12:32 PM 12.4 12.0 - 15.9 g/dL Final     Platelets   Date/Time Value Ref Range Status   11/21/2022 12:32  140 - 450 10*3/mm3 Final       Assessment/Plan     ASSESSMENT:  There are no diagnoses linked to this encounter.    *CLL, stage 0, trisomy 12 (prognostic significance not clear).  Has not required any treatment.  · WBC 9 in 2012, 11 in 2013.   · 13--15 [id number].  · Continues to have no lymphadenopathy or splenomegaly.  CLL remains stable.  · Continues to be stable.    *Leukocytosis, due to lymphocytosis.  WBC 18.9, from 17.5, from 17.9, from 16.7, from 15.3, from 18.6, from 22.2    *Lymphocytosis.  Due to CLL.  Stable.  Monitor.  ALC 9720, from 10,060, from 9970, from 8490, from 9090    *Macrocytosis.  MCV 96.2, from 96.5, from 96.9, from 95.2    *Non-drenching, intermittent, Night sweats.    · This does not appear to be due to CLL since everything else looks stable/asymptomatic.   · 10/27/2021 night sweats are not drenching, and almost none for the past month  · 5/2/2022: Denies night sweats  · 11/21/2022: Denies night sweats    *Overweight.  Being overweight is a risk factor for malignancies.  Ideally, lose weight  Body mass index is 26.52 kg/m².  BMI 25 to <30 is overweight  BMI 30 to <35 is class 1 obesity  BMI 35 to <40 is class 2 obesity  BMI 40 or higher is class 3 obesity   Remains slightly overweight.  Ideally, lose weight    PLAN:  M.MARGARET CBC 6 months  (I have previously offered annual follow-up but she prefers 6 months)

## 2022-11-27 ENCOUNTER — HOSPITAL ENCOUNTER (OUTPATIENT)
Dept: MRI IMAGING | Facility: HOSPITAL | Age: 87
Discharge: HOME | End: 2022-11-27
Admitting: INTERNAL MEDICINE

## 2022-11-27 ENCOUNTER — APPOINTMENT (OUTPATIENT)
Dept: MRI IMAGING | Facility: HOSPITAL | Age: 87
End: 2022-11-27

## 2022-11-27 DIAGNOSIS — M54.17 LUMBOSACRAL RADICULOPATHY: ICD-10-CM

## 2022-11-27 PROCEDURE — 72148 MRI LUMBAR SPINE W/O DYE: CPT

## 2023-01-26 ENCOUNTER — APPOINTMENT (OUTPATIENT)
Dept: WOMENS IMAGING | Facility: HOSPITAL | Age: 88
End: 2023-01-26
Payer: MEDICARE

## 2023-01-26 PROCEDURE — 77063 BREAST TOMOSYNTHESIS BI: CPT | Performed by: RADIOLOGY

## 2023-01-26 PROCEDURE — 77067 SCR MAMMO BI INCL CAD: CPT | Performed by: RADIOLOGY

## 2023-11-16 ENCOUNTER — TRANSCRIBE ORDERS (OUTPATIENT)
Dept: PHYSICAL THERAPY | Facility: CLINIC | Age: 88
End: 2023-11-16
Payer: MEDICARE

## 2023-11-16 DIAGNOSIS — M48.061 SPINAL STENOSIS, LUMBAR REGION WITHOUT NEUROGENIC CLAUDICATION: Primary | ICD-10-CM

## 2023-12-18 ENCOUNTER — TREATMENT (OUTPATIENT)
Dept: PHYSICAL THERAPY | Facility: CLINIC | Age: 88
End: 2023-12-18
Payer: MEDICARE

## 2023-12-18 DIAGNOSIS — M48.061 SPINAL STENOSIS, LUMBAR REGION WITHOUT NEUROGENIC CLAUDICATION: Primary | ICD-10-CM

## 2023-12-18 DIAGNOSIS — Z74.09 IMPAIRED FUNCTIONAL MOBILITY, BALANCE, AND ENDURANCE: ICD-10-CM

## 2023-12-18 PROCEDURE — 97162 PT EVAL MOD COMPLEX 30 MIN: CPT | Performed by: PHYSICAL THERAPIST

## 2023-12-18 PROCEDURE — 97530 THERAPEUTIC ACTIVITIES: CPT | Performed by: PHYSICAL THERAPIST

## 2023-12-18 NOTE — PROGRESS NOTES
[facility]    Physical Therapy Initial Evaluation and Plan of Care    Patient Name: Dionne Kaiser          :  1933  Referring Physician: Parviz Navarro Jr., MD  Diagnosis: Spinal stenosis, lumbar region without neurogenic claudication [M48.061]    Date of Evaluation: 2023  ______________________________________________________________________    Subjective Evaluation    History of Present Illness  Onset date: Long history of LBP/LE pain; Flare up last year.  Mechanism of injury: Left leg sciatic nerve in buttock and down outside of leg to ankle   (R) leg / Hip abductor tear (MRI) -     PT in past for LB near [facility] on [email or web address] -   3 epidurals - no better - last December  Back to MD - no surgery options due to     Able to walk short distances w/o Rollator/walker -  Uses rollator for longer distances -     FELL 2 x with most recently 6 weeks ago because her (L) foot was asleep and when she got up to walk, she fell - didn't realize her leg was asleep - has a tendency to get up very fast-  No apparent injuries from the falls -      PMHX  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   Arrhythmia   · Arthritis   · Back pain / Lumbar Spinal stenosis;  Lumbar Radiculopathy L>R L5  Spondylolisthesis L4-5  Knee OA  Osteopenia  CLL (chronic lymphocytic leukemia)  · Cancer (CMS/HCC)    · Dyspnea   · Edema   · GERD (gastroesophageal reflux disease)   · Hyperlipidemia   · Hypertension   · Hypothyroidism   · Interstitial pulmonary fibrosis (CMS/HCC)     · Neuropathy LEGS   · Pleural effusion, bilateral   ·       Patient Occupation: 3 children - 2 live in town - Enjoys gardening, reading Pain  Current pain ratin  At worst pain rating: 10  Location: (L) Buttock into LLE into lower leg and ankle/foot / toes -  Quality: Burning, stabbing, stinging, vice tightening;  Alleviating factors: Massager; 500mg tylenol; (R) SL w/ pillow between knees;  Exacerbated by:  Standing in kitchen > 15 min;  Gardening/ bending, reaching/pushing/pulling;  Dressing; Walking > 5 min;  Progression: no change    Social Support  Patient lives at: Home w/ 4 stairs coming in the front - with handrail ;  Basement Stairs (goes down backwards) to do laundry - Lives alone -    Diagnostic Tests  MRI studies: abnormal    Treatments  Previous treatment: physical therapy, injection treatment and medication  Patient Goals  Patient/family treatment goals: Decreased pain; Stand/walk longer for cooking/ADLs, be able to garden more,       MRI RESULTS 11/27/2022  IMPRESSION:There is mild S-shaped scoliotic curvature of the thoracolumbar spine.There is grade 1 degenerative anterior spondylolisthesis of L4 on L5 byapproximately 6 mm.There is severe central canal stenosis at the L4-5 level secondary tothe grade 1 degenerative anterior spondylolisthesis of L4 on L5,ligamentum flavum thickening, and facet hypertrophic change. A moderateto severe degree of canal stenosis is seen at the L2-3 level secondaryto bulging disc material, ligamentum flavum thickening, and facethypertrophy. These are the levels of the most prominent canal stenosis.Additionally, there is prominent left lateral recess narrowing at theL5-S1 level where bulging disc material and facet hypertrophic changeprominently narrow the left lateral recess and potentially compress thedescending left S1 nerve root sleeve. Lesser degrees of multilevelforaminal narrowing are additionally appreciated and discussed in detailabove.This report was finalized on 11/28/2022 4:01 PM by Dr. Todd Raymond M.D.    ___________________________________________________  Objective          Postural Observations    Additional Postural Observation Details  Pt presents ambulating w a 3 wheeled walker -   Significant Trendelenburg gait RLE (Torn Hip ABDuctor tendon)   Ant pelvic tilt; Fwd head posture / rounded shoulders -   S-shaped scoliotic curve to (L) tspine and (R) L-spine        Active Range of Motion     Additional Active Range of Motion Details  Lumbar Flexion: hands to mid tibia   Extension 2/3 norm  SB 20-deg (B)     Strength/Myotome Testing     Left Hip   Planes of Motion   Flexion: 4+    Right Hip   Planes of Motion   Flexion: 4    Left Knee   Flexion: 4+  Extension: 5    Right Knee   Flexion: 4+  Extension: 5    Left Ankle/Foot   Dorsiflexion: 5  Plantar flexion: 5  Inversion: 5  Eversion: 5  Great toe flexion: 5  Great toe extension: 5    Right Ankle/Foot   Dorsiflexion: 5  Plantar flexion: 5  Inversion: 5  Eversion: 5  Great toe flexion: 5    Additional Strength Details  Grossly weak (R) hip ABDuction as seen w/ Trendelenburg; 3-/5 (R);  4-/5 (L)        See Treatment Flow sheet for Exercises, Manual therapy, and modalities.   FUNCTIONAL ACTIVITIES:   TAPING / BRACING: NA  Anatomy / Dysfunction Education  Educated Patient on Aquatic Therapy, its purpose / goals, and how it is beneficial. Pt given tour of family changing area, pool, and safety including pool shoes to prevent falls, where to sign in and sit to wait.  Pt issued Aquatic Hand out and reviewed with PT -   Jt protection, ADL modification; Posture and       OSWESTRY: 24    ___________________________________________________  Assessment & Plan       Assessment  Impairments: abnormal muscle firing, abnormal muscle tone, abnormal or restricted ROM, activity intolerance, impaired physical strength, lacks appropriate home exercise program, pain with function and weight-bearing intolerance   Functional limitations: lifting, walking, uncomfortable because of pain, standing and unable to perform repetitive tasks   Assessment details: Dionne Kaiser is a 89 y.o. year-old female referred to physical therapy for low back pain.   She has comorbidities of h/o lumbar spinal stenosis, Lumbar radiculopathy, Lumbar DJD/DDD, Spondylolisthesis, Knee OA, and chronic lymphocytic leukemia that may affect her progress in the plan  of care.  Signs and symptoms are consistent with physical therapy diagnosis of Spinal stenosis, lumbar region without neurogenic claudication.    Dionne is appropriate for Aquatic Therapy.   Prognosis: good    Goals  Plan Goals: Date to achieve goals:  90 days    Date to achieve STGs:  6 weeks    STG 1 Patient will perform aquatic therapy exercises for lumbar , hip, and lower extremity  ROM/flexibility, strength and conditioning without increased pain.    STG 2 Patient will demonstrate good postural awareness with standing and walking in pool.    STG 3 Patient will report decreased pain > 25%    Date to achieve LTGs:  12 weeks    LTG 1 Patient will demonstrate an independent aquatic HEP for lumbar , hip, and lower extremity  strength,  ROM/flexibility, conditioning and balance with community resources     LTG 2 Patient will demonstrate increased core strength and balance with use of added resistive equipment.    LTG 3 Patient will report decreased functional disability on Modified Oswestry score from 27  to < 20     LTG 4 Pt able to stand for durations to allow ADLs such as cooking with modifications prn, etc.     LT Pt able to participate in gardening, etc w/ modifications prn for > 30 min      Plan  Therapy options: will be seen for skilled therapy services  Planned modality interventions: hydrotherapy  Other planned modality interventions: Aquatic therapy  Planned therapy interventions: abdominal trunk stabilization, balance/weight-bearing training, flexibility, functional ROM exercises, strengthening, postural training, neuromuscular re-education, motor coordination training, gait training, home exercise program, therapeutic activities, stretching and transfer training  Frequency: 2x week  Duration in weeks: 12  Treatment plan discussed with: patient      ___________________________________________________  Manual Therapy:         mins  58755;  Therapeutic Exercise:         mins  83456;     Neuromuscular Kavita:         mins  79524;    Therapeutic Activity:     23     mins  54247;     Eval:   20   mins    Timed Treatment:   23   mins                  Total Treatment:     43   mins    PT SIGNATURE:     Luke Georges, PT  DATE TREATMENT INITIATED: 12/18/2023  ___________________________________________________  Initial Certification  Certification Period: 3/17/2024  I certify that the therapy services are furnished while this patient is under my care.  The services outlined above are required by this patient, and will be reviewed every 90 days.     PHYSICIAN: ________________________________  DATE: ______  Parviz Navarro Jr., MD        Please sign and return via fax to [phone].. Thank you, [facility].  ______________________________________________________________________  [address] [place] [address]  Phone: [phone] Fax: [phone]

## 2023-12-19 NOTE — PROGRESS NOTES
"Physical Therapy Daily Treatment Note    [facility]  [address] [place] [address]  [phone] (phone)  [phone] (fax)    Patient: Dionne Kaiser   : 1933  Diagnosis/ICD-10 Code:  Spinal stenosis, lumbar region without neurogenic claudication [M48.061]  Referring practitioner: Parviz Navarro Jr.,*  Date of Initial Visit: Type: THERAPY  Noted: 2023  Today's Date: 2023  Patient seen for 2 sessions    Visit Diagnoses:    ICD-10-CM ICD-9-CM   1. Spinal stenosis, lumbar region without neurogenic claudication  M48.061 724.02   2. Impaired functional mobility, balance, and endurance  Z74.09 V49.89              Subjective   Pt reports that her back was sore this morning, but it feels better after taking acetaminophen.     Objective   See Exercise, Manual, and Modality Logs for complete treatment.     AQUATIC EXERCISES    Water Walk :    25 ft x2 forward/backward/sidestepping w/ LN Support  March Walk:      Tandem Walk:               Stretch 1:       Wall Walk 30 sec to a min x2  Stretch 2:       HS stretch x1 min B w/ SN     Stretch 3:           Stretch 4:           Abdominals:     x10 w/ LN  Hip Abd/Add:   x10 B  Hip Ext:        x10 B     SLR:        x10 B  Hip Circles:      -  March in Place (Alternating):     Leg Press             Mini Squat :    x12     Toe/Heel Raises:       x20  Uni-Clock:       -  Hip Circles        Step Ups  8\" Height:     -  Shoulder Rows (Alternating):    Paddle stirs:    Shoulder Horizontal Ab/Adduction:       Shoulder Ab/Adduction:            UE \"L\" presses            -  Seated at bench  Bicycle:    x2 min  Flutter/Scissor:             Clams:               STS     Assessment/Plan  Today was pt's first treatment session following initial eval. Instructed pt in proper form for all exercises. Cued pt to engage core/glutes as appropriate and to keep legs straight during hip abd/ext/flex. Continue PT POC.      "     Timed:  Aquatic Therapy    30     mins 09140;    Za Pierce PT  Physical Therapist    [place] License: [id number]

## 2023-12-21 ENCOUNTER — TREATMENT (OUTPATIENT)
Dept: PHYSICAL THERAPY | Facility: CLINIC | Age: 88
End: 2023-12-21
Payer: MEDICARE

## 2023-12-21 DIAGNOSIS — Z74.09 IMPAIRED FUNCTIONAL MOBILITY, BALANCE, AND ENDURANCE: ICD-10-CM

## 2023-12-21 DIAGNOSIS — M48.061 SPINAL STENOSIS, LUMBAR REGION WITHOUT NEUROGENIC CLAUDICATION: Primary | ICD-10-CM

## 2023-12-26 ENCOUNTER — TREATMENT (OUTPATIENT)
Dept: PHYSICAL THERAPY | Facility: CLINIC | Age: 88
End: 2023-12-26
Payer: MEDICARE

## 2023-12-26 DIAGNOSIS — M48.061 SPINAL STENOSIS, LUMBAR REGION WITHOUT NEUROGENIC CLAUDICATION: Primary | ICD-10-CM

## 2023-12-26 DIAGNOSIS — Z74.09 IMPAIRED FUNCTIONAL MOBILITY, BALANCE, AND ENDURANCE: ICD-10-CM

## 2023-12-26 PROCEDURE — 97113 AQUATIC THERAPY/EXERCISES: CPT | Performed by: PHYSICAL THERAPIST

## 2023-12-26 NOTE — PROGRESS NOTES
"Physical Therapy Daily Treatment Note    [facility]  [address] [place] [address]  [phone] (phone)  [phone] (fax)    Patient: Dionne Kaiser   : 1933  Diagnosis/ICD-10 Code:  Spinal stenosis, lumbar region without neurogenic claudication [M48.061]  Referring practitioner: Parviz Navarro Jr.,*  Date of Initial Visit: Type: THERAPY  Noted: 2023  Today's Date: 2023  Patient seen for 3 sessions             Subjective Evaluation    History of Present Illness    Subjective comment: My L leg has been hurting more since I was here last.  I think the ex that did it was the one with the noodle under my leg because I was only able to do it on the L leg and that's the one that's been hurting more.    Objective      AQUATIC EXERCISES     Water Walk :    forward/sidestepping w/ LN Support x 3 laps ea  March Walk:      Tandem Walk:               Stretch 1:       Wall Walk 30 sec to a min x2  Stretch 2:       HS stretch x1 min B w/ SN - *deferred  Stretch 3:           Stretch 4:           Decompression:   LN/rail support x 3 min  Abdominals:     x12 w/ LN  Hip Abd/Add:   x10 B  Hip Ext:        x10 B - *deferred    SLR:        x12 B  Hip Circles:      -  March in Place (Alternating):  12x    Leg Press:  Mini Squat:    x12  Toe/Heel Raises:       x20  Uni-Clock:       -  Hip Circles:  Step Ups  8\" Height:     -  Shoulder Rows (Alternating):    Paddle stirs:    Shoulder Horizontal Ab/Adduction:       Shoulder Ab/Adduction:            UE \"L\" presses            -  Seated at bench  Bicycle:    x2 min  Flutter/Scissor:             Clams:                          STS     LAQ + ankle DF 15 sec x 3 ea (for HS stretch)  .       Assessment & Plan       Assessment  Assessment details: Patient reports having more L LE pain since her initial aquatic therapy session.  Continued with most previous aquatic ex/activity for mobility, flexibility, and " strength/stabilization.  Held on backward walking, hip extension, and HS stretch with noodle today.  Increased reps on a couple of ex and tried march in place, seated LAQ + ankle DF (for HS stretch), and decompression with noodle/rail support this visit.  Instructed her to stand tall, engage abdominals / gluts, and perform ex/activity with controlled movements in comfortable range to minimize compensation / strain.  She exhibits tendency for hip flexion compensation during hip abduction kicks but able to reduce compensation with cuing.  PT provided demonstration and cuing throughout session for optimal posture, core/glut activation, and correct form/technique with ex/activity.    Plan:  Continue to assess patient response to aquatic ex/activity and modify/progress as appropriate.         /           Timed:  Aquatic Therapy    31     mins 25625;    Yamileth Dickey PT  Physical Therapist    [place] License: [id number]

## 2023-12-28 ENCOUNTER — TREATMENT (OUTPATIENT)
Dept: PHYSICAL THERAPY | Facility: CLINIC | Age: 88
End: 2023-12-28
Payer: MEDICARE

## 2023-12-28 DIAGNOSIS — M48.061 SPINAL STENOSIS, LUMBAR REGION WITHOUT NEUROGENIC CLAUDICATION: Primary | ICD-10-CM

## 2023-12-28 DIAGNOSIS — Z74.09 IMPAIRED FUNCTIONAL MOBILITY, BALANCE, AND ENDURANCE: ICD-10-CM

## 2023-12-28 NOTE — PROGRESS NOTES
"Physical Therapy Daily Treatment Note    [facility]  [address] [place] [address]  [phone] (phone)  [phone] (fax)    Patient: Dionne Kaiser   : 1933  Diagnosis/ICD-10 Code:  Spinal stenosis, lumbar region without neurogenic claudication [M48.061]  Referring practitioner: Parviz Navarro Jr.,*  Date of Initial Visit: Type: THERAPY  Noted: 2023  Today's Date: 2023  Patient seen for 4 sessions    Visit Diagnoses:    ICD-10-CM ICD-9-CM   1. Spinal stenosis, lumbar region without neurogenic claudication  M48.061 724.02   2. Impaired functional mobility, balance, and endurance  Z74.09 V49.89              Subjective   Pt reports that after her first day, her L leg was really painful for a few days. She states that she feels fine after her last session with Riddhi. She woke up and had some soreness in her L glute, but it has gotten better.     Objective   See Exercise, Manual, and Modality Logs for complete treatment.     AQUATIC EXERCISES     Water Walk :    forward/sidestepping/backwards 25 ft x2 each  March Walk:      Tandem Walk:               Stretch 1:       Wall Walk 30 sec to a min x2  Stretch 2:       HS stretch x1 min B w/ SN - *deferred (increased pain following session)  Stretch 3:         Figure 4 stretch seated (attempted B, but d/c due to increase in pain)  Stretch 4:           Decompression:          LN/rail support x 3 min *defer  Abdominals:     x12 w/ LN  Hip Abd/Add:   x10 B  Hip Ext:        x10 B   SLR:        x12 B  Hip Circles:      -  March in Place (Alternating):  12x  *defer  Leg Press: x10 B w/ Lg ring  Mini Squat:    x12  Toe/Heel Raises:       x20 *defer  Uni-Clock:       -  Hip Circles:  Step Ups  8\" Height:     -  Shoulder Rows (Alternating):    Paddle stirs:    Shoulder Horizontal Ab/Adduction:       Shoulder Ab/Adduction:            UE \"L\" presses            -  Seated at bench  Bicycle:    x2 " min  Flutter/Scissor:             Clams:                          STS: x10 w/ BUE support               LAQ + ankle DF 15 sec x 3 ea (for HS stretch)    Assessment/Plan  Pt tolerated session well overall, reporting an appropriate level of muscle fatigue following strength exercises. Added leg press to strengthen lumbopelvic musculature, required min vcs to control ascent. Attempted seated figure 4 stretch to stretch glute muscle. Pt reported increased pain, cued pt to decrease stretch intensity but pt still had discomfort. Discontinued stretch. Continue PT POC.          Timed:  Aquatic Therapy    30     mins 02537;    Za Pierce, PT  Physical Therapist    [place] License: [id number]

## 2024-01-02 ENCOUNTER — TREATMENT (OUTPATIENT)
Dept: PHYSICAL THERAPY | Facility: CLINIC | Age: 89
End: 2024-01-02
Payer: MEDICARE

## 2024-01-02 DIAGNOSIS — Z74.09 IMPAIRED FUNCTIONAL MOBILITY, BALANCE, AND ENDURANCE: ICD-10-CM

## 2024-01-02 DIAGNOSIS — M48.061 SPINAL STENOSIS, LUMBAR REGION WITHOUT NEUROGENIC CLAUDICATION: Primary | ICD-10-CM

## 2024-01-02 PROCEDURE — 97113 AQUATIC THERAPY/EXERCISES: CPT | Performed by: PHYSICAL THERAPIST

## 2024-01-02 NOTE — PROGRESS NOTES
"Physical Therapy Daily Treatment Note    [facility]  [address] [place] [address]  [phone] (phone)  [phone] (fax)    Patient: Dionne Kaiser   : 1933  Diagnosis/ICD-10 Code:  Spinal stenosis, lumbar region without neurogenic claudication [M48.061]  Referring practitioner: Parviz Navarro Jr.,*  Date of Initial Visit: Type: THERAPY  Noted: 2023  Today's Date: 2024  Patient seen for 5 sessions             Subjective Evaluation    History of Present Illness    Subjective comment: I did better after the last couple of times compared to the first time.  I was able to go without lidocaine patch for several hours yesterday which I haven't been able to do for probably 2 years.       Objective     AQUATIC EXERCISES     Water Walk :    forward/sidestepping/backwards 25 ft x2 each  March Walk:      Tandem Walk:               Stretch 1:       Wall Walk 30 sec to a min x2  Stretch 2:       HS stretch x1 min B w/ SN - *deferred (increased pain following session)  Stretch 3:         Figure 4 stretch seated (attempted B, but d/c due to increase in pain)  Stretch 4:     SKTC 15 sec x 3 ea       Decompression:          LN/rail support x 3 min  Abdominals:     x12 w/ LN  Hip Abd/Add:   x12 B  Hip Ext:        x10 B   SLR:        x12 B  Hip Circles:      -  March in Place (Alternating):  15x  Leg Press: x12 B w/ Lg ring  Mini Squat:    x12  Toe/Heel Raises:       x20 *defer  Uni-Clock:       -  Hip Circles:  Step Ups  8\" Height:     -  Shoulder Rows (Alternating):    Paddle stirs:    Shoulder Horizontal Ab/Adduction:       Shoulder Ab/Adduction:      12x w/ small foam dumbbells      UE \"L\" presses            -  Seated at bench  Bicycle:    x2 min  Flutter/Scissor:             Clams:                          STS: x10 w/ BUE support               LAQ + ankle DF 15 sec x 3 ea (for HS stretch)      Assessment & Plan       Assessment  Assessment details: Patient " reports she was able to go without lidocaine patch for several hours yesterday (first time in probably 2 years).  Continued with most previous aquatic ex/activity for mobility, flexibility, and strength/stabilization.  Increased reps on a few ex and tried SKTC stretch, and shoulder abd/add this visit.  She felt it a little in L buttock during hip extension kicks.  Emphasis on standing tall, engaging abdominals / gluts, and performing ex/activity with controlled movements in comfortable range to minimize compensation / strain.  Demonstration and cuing provided throughout session for optimal posture, core/glut activation, and correct form/technique with ex/activity.    Plan:  Continue to assess patient response to aquatic ex/activity and modify/progress as appropriate.  May consider hip sweeps w/ bent knee, paddle rows, and/or trying hip ext kicks to neutral from FF position.                    Timed:  Aquatic Therapy    35     mins 01237;    Yamileth Dickey PT  Physical Therapist    [place] License: [id number]   Subsequent Stages Histo Method Verbiage: Using a similar technique to that described above, a thin layer of tissue was removed from all areas where tumor was visible on the previous stage.  The tissue was again oriented, mapped, dyed, and processed as above.

## 2024-01-04 ENCOUNTER — TREATMENT (OUTPATIENT)
Dept: PHYSICAL THERAPY | Facility: CLINIC | Age: 89
End: 2024-01-04
Payer: MEDICARE

## 2024-01-04 DIAGNOSIS — Z74.09 IMPAIRED FUNCTIONAL MOBILITY, BALANCE, AND ENDURANCE: ICD-10-CM

## 2024-01-04 DIAGNOSIS — M48.061 SPINAL STENOSIS, LUMBAR REGION WITHOUT NEUROGENIC CLAUDICATION: Primary | ICD-10-CM

## 2024-01-04 PROCEDURE — 97113 AQUATIC THERAPY/EXERCISES: CPT | Performed by: PHYSICAL THERAPIST

## 2024-01-04 NOTE — PROGRESS NOTES
"Physical Therapy Daily Treatment Note    [facility]  [address] [place] [address]  [phone] (phone)  [phone] (fax)    Patient: Dionne Kaiser   : 1933  Diagnosis/ICD-10 Code:  Spinal stenosis, lumbar region without neurogenic claudication [M48.061]  Referring practitioner: Parviz Navarro Jr.,*  Date of Initial Visit: Type: THERAPY  Noted: 2023  Today's Date: 2024  Patient seen for 6 sessions             Subjective     Objective     AQUATIC EXERCISES     Water Walk :    forward/sidestepping x 2 laps ea (backward deferred)   March Walk:      Tandem Walk:               Stretch 1:       Wall Walk 30 sec to a min x2  Stretch 2:       HS stretch x1 min B w/ SN - *deferred (increased pain following session)  Stretch 3:         Figure 4 stretch seated (attempted B, but d/c due to increase in pain)  Stretch 4:             SKTC 15 sec x 3 ea       Decompression:          LN/rail support x 3 min  Abdominals:     x12 w/ LN  Hip Abd/Add:   x12 B  Hip Ext:        x10 B - *deferred   SLR:        x12 B  Hip Circles:      -  March in Place (Alternating):  15x  Leg Press: x12 B w/ Lg ring - *deferred  Mini Squat:    x12  Toe/Heel Raises:       x20 *defer  Uni-Clock:       -  Hip Circles:  Step Ups  8\" Height:     -  Shoulder Rows (Alternating):  10x w/ closed paddles   Paddle stirs:    Shoulder Horizontal Ab/Adduction:       Shoulder Ab/Adduction:      12x w/ small foam dumbbells      UE \"L\" presses            -  Seated at bench  Bicycle:    x2 min  Flutter/Scissor:             Clams:  15x                         STS: x10 w/ BUE support               LAQ + ankle DF 15 sec x 3 ea (for HS stretch)    Assessment & Plan       Assessment  Assessment details: Patient reports she had increased pain in LB, L buttock ,and lateral leg after last session but wasn't as bad as it was after first aquatic session.  Continued with most previous aquatic ex/activity for " mobility, flexibility, and strength/stabilization.  Held backward walking, leg press, and hip ext and added alt paddle rows this visit.  She denied any issue with ex during session.  Encouraged her to stand tall, actively engage abdominals / gluts, and reduce speed/ROM to help reduce compensation / strain and minimize risk for increased symptoms post treatment.  Demonstration and cuing provided throughout session for optimal posture, core/glut activation, and correct form/technique with ex/activity.    Plan:  Continue to assess patient response to aquatic ex/activity and modify/progress as appropriate.  May consider hip sweeps w/ bent knee, paddle rows, and/or reintroducing hip ext kicks from FF position (to neutral).                   Timed:  Aquatic Therapy    31     mins 90503;    Yamileth Dickey PT  Physical Therapist    [place] License: [id number]

## 2024-01-08 ENCOUNTER — OFFICE VISIT (OUTPATIENT)
Dept: ONCOLOGY | Facility: CLINIC | Age: 89
End: 2024-01-08
Payer: MEDICARE

## 2024-01-08 ENCOUNTER — LAB (OUTPATIENT)
Dept: LAB | Facility: HOSPITAL | Age: 89
End: 2024-01-08
Payer: MEDICARE

## 2024-01-08 VITALS
WEIGHT: 158.2 LBS | DIASTOLIC BLOOD PRESSURE: 87 MMHG | RESPIRATION RATE: 16 BRPM | OXYGEN SATURATION: 95 % | TEMPERATURE: 97 F | SYSTOLIC BLOOD PRESSURE: 167 MMHG | HEIGHT: 65 IN | HEART RATE: 85 BPM | BODY MASS INDEX: 26.36 KG/M2

## 2024-01-08 DIAGNOSIS — C91.10 CLL (CHRONIC LYMPHOCYTIC LEUKEMIA): ICD-10-CM

## 2024-01-08 DIAGNOSIS — C91.10 CLL (CHRONIC LYMPHOCYTIC LEUKEMIA): Primary | ICD-10-CM

## 2024-01-08 LAB
BASOPHILS # BLD AUTO: 0.05 10*3/MM3 (ref 0–0.2)
BASOPHILS NFR BLD AUTO: 0.3 % (ref 0–1.5)
DEPRECATED RDW RBC AUTO: 47.8 FL (ref 37–54)
EOSINOPHIL # BLD AUTO: 0.14 10*3/MM3 (ref 0–0.4)
EOSINOPHIL NFR BLD AUTO: 0.8 % (ref 0.3–6.2)
ERYTHROCYTE [DISTWIDTH] IN BLOOD BY AUTOMATED COUNT: 13 % (ref 12.3–15.4)
HCT VFR BLD AUTO: 37.7 % (ref 34–46.6)
HGB BLD-MCNC: 12 G/DL (ref 12–15.9)
IMM GRANULOCYTES # BLD AUTO: 0.19 10*3/MM3 (ref 0–0.05)
IMM GRANULOCYTES NFR BLD AUTO: 1.1 % (ref 0–0.5)
LYMPHOCYTES # BLD AUTO: 8.53 10*3/MM3 (ref 0.7–3.1)
LYMPHOCYTES NFR BLD AUTO: 51.6 % (ref 19.6–45.3)
MCH RBC QN AUTO: 32 PG (ref 26.6–33)
MCHC RBC AUTO-ENTMCNC: 31.8 G/DL (ref 31.5–35.7)
MCV RBC AUTO: 100.5 FL (ref 79–97)
MONOCYTES # BLD AUTO: 1.55 10*3/MM3 (ref 0.1–0.9)
MONOCYTES NFR BLD AUTO: 9.4 % (ref 5–12)
NEUTROPHILS NFR BLD AUTO: 36.8 % (ref 42.7–76)
NEUTROPHILS NFR BLD AUTO: 6.08 10*3/MM3 (ref 1.7–7)
NRBC BLD AUTO-RTO: 0 /100 WBC (ref 0–0.2)
PLATELET # BLD AUTO: 239 10*3/MM3 (ref 140–450)
PMV BLD AUTO: 10.5 FL (ref 6–12)
RBC # BLD AUTO: 3.75 10*6/MM3 (ref 3.77–5.28)
WBC NRBC COR # BLD AUTO: 16.54 10*3/MM3 (ref 3.4–10.8)

## 2024-01-08 PROCEDURE — 85025 COMPLETE CBC W/AUTO DIFF WBC: CPT

## 2024-01-08 PROCEDURE — 99213 OFFICE O/P EST LOW 20 MIN: CPT | Performed by: INTERNAL MEDICINE

## 2024-01-08 PROCEDURE — 1126F AMNT PAIN NOTED NONE PRSNT: CPT | Performed by: INTERNAL MEDICINE

## 2024-01-08 PROCEDURE — 36415 COLL VENOUS BLD VENIPUNCTURE: CPT

## 2024-01-08 RX ORDER — TELMISARTAN 40 MG/1
40 TABLET ORAL DAILY
COMMUNITY
Start: 2023-10-11

## 2024-01-08 NOTE — PROGRESS NOTES
"Physical Therapy Daily Treatment Note    [facility]  [address] [place] [address]  [phone] (phone)  [phone] (fax)    Patient: Dionne Kaiser   : 1933  Diagnosis/ICD-10 Code:  Spinal stenosis, lumbar region without neurogenic claudication [M48.061]  Referring practitioner: Parviz Navarro Jr.,*  Date of Initial Visit: Type: THERAPY  Noted: 2023  Today's Date: 2024  Patient seen for 7 sessions    Visit Diagnoses:    ICD-10-CM ICD-9-CM   1. Spinal stenosis, lumbar region without neurogenic claudication  M48.061 724.02   2. Impaired functional mobility, balance, and endurance  Z74.09 V49.89              Subjective   Pt reports that her R \"bum\" was hurting for a couple of days after her last session but she feels better now.    Objective   See Exercise, Manual, and Modality Logs for complete treatment.     AQUATIC EXERCISES     Water Walk :    forward/sidestepping/backward 25 ft x2 each  March Walk:      Tandem Walk:               Stretch 1:       Wall Walk 30 sec to a min x2  Stretch 2:       HS stretch x1 min B w/ SN - *deferred (increased pain following session)  Stretch 3:         Figure 4 stretch seated (attempted B, but d/c due to increase in pain)  Stretch 4:             SKTC 15 sec x 3 ea       Decompression:          LN/rail support x 3 min *defer  Abdominals:     x12 w/ LN  Hip Abd/Add:   x12 B  Hip Ext:        x10 B - *deferred   SLR:        x12 B *defer  Hip Circles:      -  March in Place (Alternating):  15x no UE support  Leg Press: x12 B w/ Lg ring - *deferred  Mini Squat:    x12  Toe/Heel Raises:       x20 *defer  Uni-Clock:       -  Hip Circles:  Step Ups  8\" Height:     -  Shoulder Rows (Alternating):  10x w/ closed paddles   Paddle stirs:    Shoulder Horizontal Ab/Adduction:     10x w/ closed paddles  Shoulder Ab/Adduction:      12x w/ closed paddles     UE \"L\" presses            -  Seated at bench  Bicycle:    x2 " min  Flutter/Scissor:    flutter x20         Clams:  15x                         STS: x10 w/ BUE support *defer               LAQ + ankle DF 15 sec x 3 ea (for HS stretch) *defer    Assessment/Plan  Pt tolerated session well overall, reporting an appropriate level of muscle fatigue following strength exercises. Cued pt to contract core when pushing down on noodle during abdominals. Added shoulder horizontal abduction w/ closed paddles in order to strengthen postural muscles. Added a balance challenge to marching on place by decreasing UE support. Continue PT POC.          Timed:  Aquatic Therapy    32     mins [id number];    Za Pierce, SHANNAN  Physical Therapist    [place] License: [id number]

## 2024-01-08 NOTE — PROGRESS NOTES
Subjective .     REASON FOR FOLLOWUP:  CLL.                             HISTORY OF PRESENT ILLNESS:  The patient is a 89 y.o. year old female  who is here for follow-up with the above-mentioned history.    No new problems.  Feeling fine.  No fever, chills, weight loss, night sweats    Past Medical History:   Diagnosis Date    Arrhythmia     Arthritis     Asthma     CKD (chronic kidney disease)     CLL (chronic lymphocytic leukemia)     Stage 0, trisomy 12    GERD (gastroesophageal reflux disease)     H/O Frequent PACs and PVCs     H/O Lymphocytosis     H/O Transient amnesia     HTN (hypertension)     Hyperlipidemia     Hypothyroid     Lumbar stenosis     Neuropathy     Legs    Osteopenia     Pain     H/O diffuse chest, epigastric and lower abdominal pain    Seasonal allergies     Ulcerative colitis        HEMATOLOGIC/ONCOLOGIC HISTORY:  (History from previous dates can be found in the separate document.)    MEDICATIONS    Current Outpatient Medications:     allopurinol (ZYLOPRIM) 100 MG tablet, allopurinol 100 mg tablet  Take 1 tablet every day by oral route., Disp: , Rfl:     Azilsartan Medoxomil 40 MG tablet, azilsartan medoxomil 40 mg tablet  TAKE 1/2 TABLET (20 MG) BY ORAL ROUTE ONCE DAILY, Disp: , Rfl:     benzonatate (TESSALON) 100 MG capsule, Take 1 capsule by mouth 3 (Three) Times a Day As Needed for Cough., Disp: , Rfl:     Cholecalciferol (VITAMIN D-3 PO), Take 2,000 Units by mouth daily., Disp: , Rfl:     denosumab (Prolia) 60 MG/ML solution prefilled syringe syringe, 1 mL., Disp: , Rfl:     dextromethorphan-guaifenesin (ROBITUSSIN-DM)  MG/5ML syrup, Take 5 mL by mouth Every 4 (Four) Hours As Needed., Disp: , Rfl:     fluticasone (FLONASE) 50 MCG/ACT nasal spray, Flonase Allergy Relief 50 mcg/actuation nasal spray,suspension  2 puffs each nostril daily., Disp: , Rfl:     folic acid (FOLVITE) 1 MG tablet, Take 1 tablet by mouth Daily., Disp: , Rfl:     furosemide (LASIX) 40 MG tablet, Take 1 tablet  by mouth Daily., Disp: , Rfl:     guaiFENesin-codeine (ROMILAR-AC) 100-10 MG/5ML syrup, TAKE 10ML BY MOUTH 6 TIMES A DAY, Disp: , Rfl:     hydroCHLOROthiazide (MICROZIDE) 12.5 MG capsule, hydrochlorothiazide 12.5 mg capsule  Take 1 capsule every day by oral route., Disp: , Rfl:     ipratropium-albuterol (DUO-NEB) 0.5-2.5 mg/3 ml nebulizer, 3 mL Every 6 (Six) Hours., Disp: , Rfl:     l-methylfolate-algae-B6-B12 (METANX) 3-90.314-2-35 MG capsule capsule, Every 12 (Twelve) Hours., Disp: , Rfl:     levothyroxine (SYNTHROID, LEVOTHROID) 75 MCG tablet, Take 1 tablet by mouth Daily., Disp: , Rfl:     lidocaine (LIDODERM) 5 %, Lidoderm 5 % topical patch  APPLY 1-2 PATCH BY TRANSDERMAL ROUTE ONCE DAILY (MAY WEAR UP TO 12HOURS.), Disp: , Rfl:     loratadine (CLARITIN) 10 MG tablet, Take 1 tablet by mouth Daily., Disp: , Rfl:     metoprolol succinate XL (TOPROL-XL) 25 MG 24 hr tablet, TAKE ONE TABLET BY MOUTH ONE TIME DAILY , Disp: 30 tablet, Rfl: 0    olmesartan (BENICAR) 40 MG tablet, olmesartan 40 mg tablet  Take 1 tablet every day by oral route., Disp: , Rfl:     omeprazole (PriLOSEC) 20 MG capsule, Take 1 capsule by mouth Daily., Disp: , Rfl:     rosuvastatin (CRESTOR) 20 MG tablet, Take  by mouth Daily., Disp: , Rfl:     sulfaSALAzine (AZULFIDINE) 500 MG tablet, Take 1 tablet by mouth 4 (Four) Times a Day., Disp: , Rfl:     telmisartan (MICARDIS) 40 MG tablet, Take 1 tablet by mouth Daily., Disp: , Rfl:     traMADol (ULTRAM) 50 MG tablet, Take 1 tablet every 4 hours as needed for pain.  Maximum of 4 tablets per 24 hours., Disp: , Rfl:     ALLERGIES:     Allergies   Allergen Reactions    Penicillins Anaphylaxis       SOCIAL HISTORY:       Social History     Socioeconomic History    Marital status:    Tobacco Use    Smoking status: Former     Packs/day: 1.50     Years: 40.00     Additional pack years: 0.00     Total pack years: 60.00     Types: Cigarettes     Quit date:      Years since quittin.0     "Smokeless tobacco: Former   Substance and Sexual Activity    Alcohol use: Yes     Alcohol/week: 7.0 standard drinks of alcohol     Types: 7 Glasses of wine per week     Comment: 1 drink daily    Drug use: Never    Sexual activity: Defer         FAMILY HISTORY:  Cancer-related family history includes Colon cancer in her mother.    REVIEW OF SYSTEMS:  Review of Systems   Constitutional:  Negative for activity change.   HENT:  Negative for nosebleeds and trouble swallowing.    Respiratory:  Negative for shortness of breath and wheezing.    Cardiovascular:  Negative for chest pain and palpitations.   Gastrointestinal:  Negative for constipation, diarrhea and nausea.   Genitourinary:  Negative for dysuria and hematuria.   Musculoskeletal:  Negative for arthralgias and myalgias.   Skin:  Negative for rash and wound.   Neurological:  Negative for seizures and syncope.   Hematological:  Negative for adenopathy. Does not bruise/bleed easily.   Psychiatric/Behavioral:  Negative for confusion.       Objective    Vitals:    01/08/24 1146   BP: 167/87   Pulse: 85   Resp: 16   Temp: 97 °F (36.1 °C)   TempSrc: Temporal   SpO2: 95%   Weight: 71.8 kg (158 lb 3.2 oz)   Height: 164.6 cm (64.8\")   PainSc: 0-No pain         1/8/2024    11:41 AM   Current Status   ECOG score 1      PHYSICAL EXAM:        CONSTITUTIONAL:  Vital signs reviewed.  No distress, looks comfortable.  EYES:  Conjunctiva and lids unremarkable.  PERRLA  EARS,NOSE,MOUTH,THROAT:  Ears and nose appear unremarkable.  Lips, teeth, gums appear unremarkable.  RESPIRATORY:  Normal respiratory effort.  Lungs clear to auscultation bilaterally.  CARDIOVASCULAR:  Normal S1, S2.  No murmurs rubs or gallops.  No significant lower extremity edema.  GASTROINTESTINAL: Abdomen appears unremarkable.  Nontender.  No hepatomegaly.  No splenomegaly.  LYMPHATIC:  No cervical, supraclavicular, axillary lymphadenopathy.  SKIN:  Warm.  No rashes.  PSYCHIATRIC:  Normal judgment and insight.  " Normal mood and affect.        RECENT LABS:        WBC   Date/Time Value Ref Range Status   01/08/2024 11:29 AM 16.54 (H) 3.40 - 10.80 10*3/mm3 Final     Hemoglobin   Date/Time Value Ref Range Status   01/08/2024 11:29 AM 12.0 12.0 - 15.9 g/dL Final     Platelets   Date/Time Value Ref Range Status   01/08/2024 11:29  140 - 450 10*3/mm3 Final       Assessment/Plan     ASSESSMENT:  There are no diagnoses linked to this encounter.      *CLL, stage 0, trisomy 12 (prognostic significance not clear).  Has not required any treatment.  WBC 9 in 2012, 11 in 2013.   13--15 [id number].  Continues to have no lymphadenopathy or splenomegaly.  CLL remains stable.  Continues to be stable.    *Leukocytosis, due to lymphocytosis.  WBC 16.5, from 16.5, 18.9, from 17.5, from 17.9, from 16.7, from 15.3, from 18.6, from 22.2    *Lymphocytosis.  Due to CLL.  Stable.  Monitor.  ALC 2530, from 9450, from 9720, from 10,060, from 9970, from 8490, from 9090    *Macrocytosis.  .5, from 98.3, from 96.2, from 96.5, from 96.9, from 95.2    *Non-drenching, intermittent, Night sweats.    This does not appear to be due to CLL since everything else looks stable/asymptomatic.   10/27/2021 night sweats are not drenching, and almost none for the past month  5/2/2022: Denies night sweats  11/21/2022: Denies night sweats  7/12/2023: Denies night sweats  1/8/2024: Denies night sweats    *Overweight.  Being overweight is a risk factor for malignancies.  Ideally, lose weight  Body mass index is 26.49 kg/m².  BMI 25 to <30 is overweight  BMI 30 to <35 is class 1 obesity  BMI 35 to <40 is class 2 obesity  BMI 40 or higher is class 3 obesity   Remains slightly overweight.  Ideally, lose weight    PLAN:  M.D. CBC 6 months  (I have previously offered annual follow-up but she prefers 6 months)

## 2024-01-09 ENCOUNTER — TREATMENT (OUTPATIENT)
Dept: PHYSICAL THERAPY | Facility: CLINIC | Age: 89
End: 2024-01-09
Payer: MEDICARE

## 2024-01-09 DIAGNOSIS — M48.061 SPINAL STENOSIS, LUMBAR REGION WITHOUT NEUROGENIC CLAUDICATION: Primary | ICD-10-CM

## 2024-01-09 DIAGNOSIS — Z74.09 IMPAIRED FUNCTIONAL MOBILITY, BALANCE, AND ENDURANCE: ICD-10-CM

## 2024-01-10 NOTE — PROGRESS NOTES
"Physical Therapy Daily Treatment Note    [facility]  [address] [place] [address]  [phone] (phone)  [phone] (fax)    Patient: Dionne Kaiser   : 1933  Diagnosis/ICD-10 Code:  Spinal stenosis, lumbar region without neurogenic claudication [M48.061]  Referring practitioner: Parviz Navarro Jr.,*  Date of Initial Visit: Type: THERAPY  Noted: 2023  Today's Date: 2024  Patient seen for 8 sessions    Visit Diagnoses:    ICD-10-CM ICD-9-CM   1. Spinal stenosis, lumbar region without neurogenic claudication  M48.061 724.02   2. Impaired functional mobility, balance, and endurance  Z74.09 V49.89              Subjective   Pt reports she feels really good, which she is \"amazed\" by. She states she was a little but sore yesterday.    Objective   See Exercise, Manual, and Modality Logs for complete treatment.     AQUATIC EXERCISES     Water Walk :    forward/sidestepping/backward 25 ft x2 each  March Walk:      Tandem Walk:               Stretch 1:       Wall Walk 30 sec to a min x2  Stretch 2:       HS stretch x1 min B w/ SN - *deferred (increased pain following session)  Stretch 3:         Figure 4 stretch seated (attempted B, but d/c due to increase in pain)  Stretch 4:             SKTC 15 sec x 3 ea       Decompression:          LN/rail support x 3 min *defer  Abdominals:     x12 w/ LN *defer  Hip Abd/Add:   x12 B  Hip Ext:        x10 B - *deferred   SLR:        x12 B   Hip Circles:      -  March in Place (Alternating):  15x no UE support  Leg Press: x12 B w/ Lg ring  Mini Squat:    x12  Toe/Heel Raises:       x20 *defer  Uni-Clock:       -  Hip Circles:  Step Ups  8\" Height:     -  Shoulder Rows (Alternating):  10x w/ closed paddles   Paddle stirs:    Shoulder Horizontal Ab/Adduction:     10x w/ closed paddles *defer  Shoulder Ab/Adduction:      12x w/ closed paddles   *defer  UE \"L\" presses            -  Seated at bench  Bicycle:    x2 " min  Flutter/Scissor:    flutter x20       *defer  Clams:  15x           *defer              STS: x10 w/ BUE support                LAQ + ankle DF 15 sec x 3 ea (for HS stretch)     Assessment/Plan  Pt tolerated session well overall, reporting an appropriate level of muscle fatigue following strength exercises. Required min vcs for form during SLRs (to keep knee extended), which she was able to correct. Continue PT POC.          Timed:  Aquatic Therapy    29     mins 99955;    Za Pierce PT  Physical Therapist    [place] License: [id number]

## 2024-01-11 ENCOUNTER — TREATMENT (OUTPATIENT)
Dept: PHYSICAL THERAPY | Facility: CLINIC | Age: 89
End: 2024-01-11
Payer: MEDICARE

## 2024-01-11 DIAGNOSIS — Z74.09 IMPAIRED FUNCTIONAL MOBILITY, BALANCE, AND ENDURANCE: ICD-10-CM

## 2024-01-11 DIAGNOSIS — M48.061 SPINAL STENOSIS, LUMBAR REGION WITHOUT NEUROGENIC CLAUDICATION: Primary | ICD-10-CM

## 2024-01-15 NOTE — PROGRESS NOTES
"Physical Therapy Daily Treatment Note    [facility]  [address] [place] [address]  [phone] (phone)  [phone] (fax)    Patient: Dionne Kaiser   : 1933  Diagnosis/ICD-10 Code:  Spinal stenosis, lumbar region without neurogenic claudication [M48.061]  Referring practitioner: Parviz Navarro Jr.,*  Date of Initial Visit: Type: THERAPY  Noted: 2023  Today's Date: 2024  Patient seen for 9 sessions    Visit Diagnoses:    ICD-10-CM ICD-9-CM   1. Spinal stenosis, lumbar region without neurogenic claudication  M48.061 724.02   2. Impaired functional mobility, balance, and endurance  Z74.09 V49.89              Subjective   Pt reports that she didn't hurt at all yesterday, she didn't even take a OTC pain pill. However, she states that her L butt and down her L leg has been achey all morning.    Objective   See Exercise, Manual, and Modality Logs for complete treatment.     AQUATIC EXERCISES     Water Walk :    forward/sidestepping/backward 25 ft x2 each -on own  March Walk:      Tandem Walk:               Stretch 1:       Wall Walk 30 sec to a min x2 -on own  Stretch 2:       HS stretch x30 sec B w/ SN  Stretch 3:         Figure 4 stretch seated (attempted B, but d/c due to increase in pain)  0  Stretch 4:             SKTC 15 sec x 3 ea     *defer  Decompression:          LN/rail support x 3 min *defer  Abdominals:     x12 w/ LN   Hip Abd/Add:   x12 B  Hip Ext:        x10 B - *deferred   SLR:        x12 B   Hip Circles:      -  March in Place (Alternating):  15x no UE support -on own  Leg Press: x12 B w/ Lg ring  Mini Squat:    x12  Toe/Heel Raises:       x20   Uni-Clock:      x8 each leg  Hip Circles:  Step Ups  8\" Height:     -  Shoulder Rows (Alternating):  10x w/ closed paddles   Paddle stirs:    Shoulder Horizontal Ab/Adduction:     10x w/ closed paddles   Shoulder Ab/Adduction:      12x w/ closed paddles     UE \"L\" presses            " -  Seated at bench  Bicycle:    x2 min  Flutter/Scissor:    flutter x20       *defer  Clams:  15x           *defer              STS: x10 w/ no UE support                LAQ + ankle DF 15 sec x 3 ea (for HS stretch) *defer     Assessment/Plan  Pt tolerated session well overall, reporting an appropriate level of muscle fatigue following strength exercises. Added uni clock this session to challenge dynamic balance and strengthen hip of stabilizing leg. Required min vcs for sequencing, which she was able to correct. Pt was able to perform STS without UE support, indicating improvement in functional strength. Continue PT POC.          Timed:  Aquatic Therapy    30     mins 47554;    aZ Pierce, SHANNAN  Physical Therapist    [place] License: [id number]

## 2024-01-16 ENCOUNTER — TREATMENT (OUTPATIENT)
Dept: PHYSICAL THERAPY | Facility: CLINIC | Age: 89
End: 2024-01-16
Payer: MEDICARE

## 2024-01-16 DIAGNOSIS — Z74.09 IMPAIRED FUNCTIONAL MOBILITY, BALANCE, AND ENDURANCE: ICD-10-CM

## 2024-01-16 DIAGNOSIS — M48.061 SPINAL STENOSIS, LUMBAR REGION WITHOUT NEUROGENIC CLAUDICATION: Primary | ICD-10-CM

## 2024-01-16 NOTE — PROGRESS NOTES
"Physical Therapy Daily Treatment Note    [facility]  [address] [place] [address]  [phone] (phone)  [phone] (fax)    Patient: Dionne Kaiser   : 1933  Diagnosis/ICD-10 Code:  Spinal stenosis, lumbar region without neurogenic claudication [M48.061]  Referring practitioner: Parviz Navarro Jr.,*  Date of Initial Visit: Type: THERAPY  Noted: 2023  Today's Date: 2024  Patient seen for 10 sessions    Visit Diagnoses:    ICD-10-CM ICD-9-CM   1. Spinal stenosis, lumbar region without neurogenic claudication  M48.061 724.02   2. Impaired functional mobility, balance, and endurance  Z74.09 V49.89              Subjective   Pt reports that the back of her L thigh is hurting. It started hurting when she was putting on her swimsuit at home.    Objective   See Exercise, Manual, and Modality Logs for complete treatment.     AQUATIC EXERCISES     Water Walk :    forward/sidestepping/backward 25 ft x2 each -on own  March Walk:      Tandem Walk:               Stretch 1:       Wall Walk 30 sec to a min x2 -on own  Stretch 2:       HS stretch x30 sec B w/ SN  Stretch 3:         Figure 4 stretch seated (attempted B, but d/c due to increase in pain)  0  Stretch 4:             SKTC 15 sec x 3 ea     *defer  Decompression:          LN/rail support x 3 min *defer  Abdominals:     x12 w/ LN   Hip Abd/Add:   x12 B  Hip Ext:        x10 B - *deferred   SLR:        x12 B   Hip Circles:     x10 each direction  in Place (Alternating):  15x no UE support   Leg Press: x12 B w/ Lg ring *defer  Mini Squat:    x15  Toe/Heel Raises:       x20   Uni-Clock:      x8 each leg  Step Ups from the bottom of the stairs:     x12 B  Shoulder Rows (Alternating):  10x w/ closed paddles   Paddle stirs:    Shoulder Horizontal Ab/Adduction:     10x w/ closed paddles   Shoulder Ab/Adduction:      12x w/ closed paddles     UE \"L\" presses            -  Seated at bench  Bicycle:    x2 " min  Flutter/Scissor:    flutter x20        Clams:  15x                        STS: x10 w/ no UE support *defer               LAQ + ankle DF 15 sec x 3 ea (for HS stretch) *defer     Assessment/Plan  Pt tolerated session well overall, reporting an appropriate level of muscle fatigue following strength exercises. Added step ups and hip circles this session to strengthen lumbopelvic musculature. Required min vcs for form during hip circles (maintain knee ext to allow for movement to come from hips), which she was able to correct. Continue PT POC.          Timed:  Aquatic Therapy    29     mins 90739;    Za Pierce, SHANNAN  Physical Therapist    [place] License: [id number]

## 2024-01-18 ENCOUNTER — TREATMENT (OUTPATIENT)
Dept: PHYSICAL THERAPY | Facility: CLINIC | Age: 89
End: 2024-01-18
Payer: MEDICARE

## 2024-01-18 DIAGNOSIS — M48.061 SPINAL STENOSIS, LUMBAR REGION WITHOUT NEUROGENIC CLAUDICATION: Primary | ICD-10-CM

## 2024-01-18 DIAGNOSIS — Z74.09 IMPAIRED FUNCTIONAL MOBILITY, BALANCE, AND ENDURANCE: ICD-10-CM

## 2024-01-22 NOTE — PROGRESS NOTES
"Physical Therapy Daily Treatment Note    [facility]  [address] [place] [address]  [phone] (phone)  [phone] (fax)    Patient: Dionne Kaiser   : 1933  Diagnosis/ICD-10 Code:  Spinal stenosis, lumbar region without neurogenic claudication [M48.061]  Referring practitioner: Parviz Navarro Jr.,*  Date of Initial Visit: Type: THERAPY  Noted: 2023  Today's Date: 2024  Patient seen for 11 sessions    Visit Diagnoses:    ICD-10-CM ICD-9-CM   1. Spinal stenosis, lumbar region without neurogenic claudication  M48.061 724.02   2. Impaired functional mobility, balance, and endurance  Z74.09 V49.89              Subjective   Pt reports she is having pain mostly in her L hamstring.    Objective   See Exercise, Manual, and Modality Logs for complete treatment.     AQUATIC EXERCISES     Water Walk :    forward/sidestepping/backward 25 ft x6 each -on own  March Walk:      Tandem Walk:               Stretch 1:       Wall Walk 30 sec to a min x2 -on own  Stretch 2:       HS stretch x30 sec B w/ SN + hip sweeps  Stretch 3:         Figure 4 stretch seated (attempted B, but d/c due to increase in pain)    Stretch 4:             SKTC 15 sec x 3 ea     *defer  Decompression:          LN/rail support x 3 min *defer  Abdominals:     x12 w/ LN   Hip Abd/Add:   x12 B *defer  Hip Ext:        x10 B - *defer  SLR:        x12 B *defer  Hip Circles:     x10 each direction B *defer  March in Place (Alternating):  15x no UE support *defer  Leg Press: x15 B w/ Lg ring   Mini Squat:    x15 *defer  Toe/Heel Raises:       x20 *defer  Uni-Clock:      x8 each leg *defer  Step Ups from the bottom of the stairs:     x12 B *defer  Shoulder Rows (Alternating):  10x w/ closed paddles  *defer  Paddle stirs:    Shoulder Horizontal Ab/Adduction:     10x w/ closed paddles *defer  Shoulder Ab/Adduction:      12x w/ closed paddles   *defer  UE \"L\" presses            -  Seated at " bench  Bicycle:    x2 min  Flutter/Scissor:    flutter x20        Clams:  15x                        STS: x10 w/ no UE support               LAQ + ankle DF 15 sec x 3 ea (for HS stretch) *defer    Assessment/Plan  Pt tolerated session well overall, reporting an appropriate level of muscle fatigue following strength exercises. Added hip sweeps to hamstring stretch this session to stretch medial and lateral hamstrings muscles. Pt reported a decrease in hamstring pain following stretches. Continue PT POC.        Timed:  Aquatic Therapy    30     mins 08077;    Za Pierce, PT  Physical Therapist    [place] License: [id number]

## 2024-01-23 ENCOUNTER — TREATMENT (OUTPATIENT)
Dept: PHYSICAL THERAPY | Facility: CLINIC | Age: 89
End: 2024-01-23
Payer: MEDICARE

## 2024-01-23 DIAGNOSIS — M48.061 SPINAL STENOSIS, LUMBAR REGION WITHOUT NEUROGENIC CLAUDICATION: Primary | ICD-10-CM

## 2024-01-23 DIAGNOSIS — Z74.09 IMPAIRED FUNCTIONAL MOBILITY, BALANCE, AND ENDURANCE: ICD-10-CM

## 2024-01-23 NOTE — PROGRESS NOTES
Physical Therapy 30-Day / 10-Visit Progress Note       [facility] Therapy [facility]estone  [address] [place] [address]  [phone] (phone)  [phone] (fax)      Patient: Dionne Kaiser   : 1933  Diagnosis/ICD-10 Code:  Spinal stenosis, lumbar region without neurogenic claudication [M48.061]  Referring practitioner: Parviz Navarro Jr.,*  Date of Initial Visit: Type: THERAPY  Noted: 2023  Today's Date: 2024  Patient seen for 12 sessions      Subjective:     Clinical Progress: improved  Home Program Compliance: N/A  Treatment has included:  aquatic therapy    Subjective   Pt reports that since starting therapy, she feels more stable and stronger. She states she has noticed she can cook longer without sitting. Finally, she states that she takes less acetaminophen.   Pt states she would like to get better at standing longer and walking better.    Objective   See Exercise log for complete treatment.      Active Range of Motion *NOT TESTED DUE TO TIME     Additional Active Range of Motion Details  Lumbar Flexion: hands to mid tibia   Extension 2/3 norm  SB 20-deg (B)      Strength/Myotome Testing      Left Hip   Planes of Motion   Flexion: 4+  ABD     Right Hip   Planes of Motion   Flexion: 4+  ABD     Left Knee   Flexion: 4+  Extension: 5     Right Knee   Flexion: 5  Extension: 5    GAIT  Pt ambulates with significant Trendelenberg B, R>L    Functional Outcome Measure: MONICA: 50% disability (50% functional ability)  Initial eval: 50% disability    AQUATIC EXERCISES     Water Walk :    forward/sidestepping/backward 25 ft x6 each   March Walk:      Tandem Walk:               Stretch 1:       Wall Walk 30 sec to a min x2  Stretch 2:       HS stretch x30 sec B w/ SN + hip sweeps *defer  Stretch 3:         Figure 4 stretch seated (attempted B, but d/c due to increase in pain)  *defer  Stretch 4:             SKTC 15 sec x 3 ea     *defer  Decompression:          LN/rail  "support x 3 min *defer  Abdominals:     x12 w/ LN *defer  Hip Abd/Add:   x15 B  Hip Ext:        x15 B   SLR:        x12 B *defer  Hip Circles:     x10 each direction B *defer  March in Place (Alternating):  15x no UE support *defer  Leg Press: x15 B w/ Lg ring *defer  Mini Squat:    x15  Toe/Heel Raises:       x20 *defer  Uni-Clock:      x8 each leg    Step Ups from the bottom of the stairs:     x12 B *defer  Shoulder Rows (Alternating):  10x w/ closed paddles  *defer  Paddle stirs:    Shoulder Horizontal Ab/Adduction:     10x w/ closed paddles *defer  Shoulder Ab/Adduction:      12x w/ closed paddles   *defer  UE \"L\" presses            -  Seated at bench -ON OWN  Bicycle:    x2 min  Flutter/Scissor:    flutter x20        Clams:  15x                        STS: x10 w/ no UE support               LAQ + ankle DF 15 sec x 3 ea (for HS stretch) *defer    Assessment/Plan  Dionne Kaiser is a 89 y.o. female who has attended 11 aquatic therapy sessions since her evaluation on 12/18/2023 for treatment of low back pain. Patient has comorbidities / personal factors including h/o lumbar spinal stenosis, Lumbar radiculopathy, Lumbar DJD/DDD, Spondylolisthesis, Knee OA, and chronic lymphocytic leukemia that may affect her progress in the therapy plan of care. Pt has met 2/8 goals. Pt demonstrates improved LE strength. Pt also demonstrates improvement in posture/postural awareness while ambulating in the water. Finally, pt reports a reduction in pain levels overall and improved tolerance to standing. She continues to demonstrate decreased standing tolerance (15 min, would like to stand longer to cook), BLE weakness, gait and postural deviations, and balance deficits. Pt would benefit from continued skilled physical therapy to address these impairments.     Goals  Plan Goals: Date to achieve goals:  90 days     Date to achieve STGs:  6 weeks     STG 1 Patient will perform aquatic therapy exercises for lumbar , hip, and lower " extremity  ROM/flexibility, strength and conditioning without increased pain. MET     STG 2 Patient will demonstrate good postural awareness with standing and walking in pool. MET     STG 3 Patient will report decreased pain > 25% PROGRESSING; pt reports a reduction in pain levels by 20%     Date to achieve LTGs:  12 weeks     LTG 1 Patient will demonstrate an independent aquatic HEP for lumbar , hip, and lower extremity  strength,  ROM/flexibility, conditioning and balance with community resources ONGOING     LTG 2 Patient will demonstrate increased core strength and balance with use of added resistive equipment. PROGRESSING     LTG 3 Patient will report decreased functional disability on Modified Oswestry score from 27  to < 20  ONGOING     LTG 4 Pt able to stand for durations to allow ADLs such as cooking with modifications prn, etc. PROGRESSING; able to stand for 15 min but would like to stand longer for cooking     LT Pt able to participate in gardening, etc w/ modifications prn for > 30 min ONGOING     Recommendations: Continue as planned  Timeframe: 6 weeks  Prognosis to achieve goals: good    PT Signature: Za Pierce, PT  [place] License:[id number]      Based upon review of the patient's progress and continued therapy plan, it is my medical opinion that Dionne Kaiser should continue physical therapy treatment at [facility] PHYSICAL THERAPY  [address] [place] [address] [address] [place] [address] [phone].    Signature: __________________________________  Parviz Navarro Jr., MD  NPI: [id number]                                          Timed:  Aquatic therapy  [id number]    32   mins

## 2024-01-25 ENCOUNTER — TREATMENT (OUTPATIENT)
Dept: PHYSICAL THERAPY | Facility: CLINIC | Age: 89
End: 2024-01-25
Payer: MEDICARE

## 2024-01-25 DIAGNOSIS — Z74.09 IMPAIRED FUNCTIONAL MOBILITY, BALANCE, AND ENDURANCE: ICD-10-CM

## 2024-01-25 DIAGNOSIS — M48.061 SPINAL STENOSIS, LUMBAR REGION WITHOUT NEUROGENIC CLAUDICATION: Primary | ICD-10-CM

## 2024-01-29 ENCOUNTER — APPOINTMENT (OUTPATIENT)
Dept: WOMENS IMAGING | Facility: HOSPITAL | Age: 89
End: 2024-01-29
Payer: MEDICARE

## 2024-01-29 PROCEDURE — 77067 SCR MAMMO BI INCL CAD: CPT | Performed by: RADIOLOGY

## 2024-01-29 PROCEDURE — 77063 BREAST TOMOSYNTHESIS BI: CPT | Performed by: RADIOLOGY

## 2024-01-30 ENCOUNTER — TREATMENT (OUTPATIENT)
Dept: PHYSICAL THERAPY | Facility: CLINIC | Age: 89
End: 2024-01-30
Payer: MEDICARE

## 2024-01-30 DIAGNOSIS — M48.061 SPINAL STENOSIS, LUMBAR REGION WITHOUT NEUROGENIC CLAUDICATION: Primary | ICD-10-CM

## 2024-01-30 DIAGNOSIS — Z74.09 IMPAIRED FUNCTIONAL MOBILITY, BALANCE, AND ENDURANCE: ICD-10-CM

## 2024-01-30 PROCEDURE — 97113 AQUATIC THERAPY/EXERCISES: CPT | Performed by: PHYSICAL THERAPIST

## 2024-01-30 NOTE — PROGRESS NOTES
"Physical Therapy Daily Treatment Note    [facility] Physical Therapy [facility]  [address] [place] [address]  [phone] (phone)  [phone] (fax)    Patient: Dionne Kaiser   : 1933  Diagnosis/ICD-10 Code:  Spinal stenosis, lumbar region without neurogenic claudication [M48.061]  Referring practitioner: Parviz Navarro Jr.,*  Date of Initial Visit: Type: THERAPY  Noted: 2023  Today's Date: 2024  Patient seen for 13 sessions             Subjective Evaluation    History of Present Illness    Subjective comment: I don't have any pain this morning.  I've been practicing STS at home from my dining room chairYou know what is astounding to me - forat least 5 years, I have been unable to stand on one leg and now I can on both legs although only briefly.       Objective     AQUATIC EXERCISES     Water Walk :    forward/sidestepping/backward 25 ft x6 ea  March Walk:      Tandem Walk:               Stretch 1:       Wall Walk 30 sec to a min x2  Stretch 2:       HS stretch x30 sec B w/ SN + hip sweeps - *deferred  Stretch 3:         Figure 4 stretch seated (attempted B, but d/c due to increase in pain)  - *deferred  Stretch 4:             SKTC 15 sec x 3 ea    - *deferred  Decompression:          LN/rail support x 3 min  Abdominals:     x15 w/ LN  Hip Abd/Add:   x15 B  Hip Ext:        x15 B   SLR:        x12 B - *deferred  Hip Circles:     x10 each direction B - *deferred  March in Place (Alternating):  15x no UE support - *deferred  Leg Press: x15 B w/ Lg ring  Mini Squat:    x15  Toe/Heel Raises:       x20 - *deferred  Uni-Clock:      x10 ea leg    Step Ups from the bottom of the stairs:     x12 B  Shoulder Rows (Alternating):  10x w/ closed paddles  Paddle stirs:    Shoulder Horizontal Ab/Adduction:     10x w/ closed paddles - *deferred  Shoulder Ab/Adduction:      12x w/ closed paddles  UE \"L\" presses            -  SLS balance R/L  10-15 sec x 2 ea  Seated at bench   Bicycle: "    x2 min  Flutter:      x20        Clams:  15x              STS:     x10 w/ no UE support               LAQ + ankle DF 15 sec x 3 ea (for HS stretch) *defer    Ended session seated at jets (in therapy pool) x 5 min for muscle relaxation / symptom reduction      Assessment & Plan       Assessment  Assessment details: Patient reports not having pain at start of session.  She states she is now able to stand on one leg momentarily which she hasn't been able to do for at least 5 years.  Continued with most previous aquatic ex/activity for mobility, flexibility, and strength/stabilization.  Tried SLS balance this visit.  Emphasis on standing tall, actively engaging abdominals / gluts, and performing ex/activity with controlled movement in comfortable range to minimize compensation / strain and optimize benefit.  Demonstration and cuing provided throughout session for optimal posture, core/glut activation, and correct form/technique with ex/activity.  Post session, patient noted the back of her L LE was hurting a little which she attributed to the leg press ex but wanted to do ex for strengthening benefit.  Encouraged her to report symptoms as they occur during session so ex can be modified / discontinued if needed.    Plan:  Continue with skilled therapy working toward independent HEP to facilitate transition to self care over the next few weeks.                   Timed:  Aquatic Therapy    36     mins [id number];    Yamileth Dickey PT  Physical Therapist    [place] License: [id number]

## 2024-02-01 ENCOUNTER — TREATMENT (OUTPATIENT)
Dept: PHYSICAL THERAPY | Facility: CLINIC | Age: 89
End: 2024-02-01
Payer: MEDICARE

## 2024-02-01 DIAGNOSIS — M48.061 SPINAL STENOSIS, LUMBAR REGION WITHOUT NEUROGENIC CLAUDICATION: Primary | ICD-10-CM

## 2024-02-01 DIAGNOSIS — Z74.09 IMPAIRED FUNCTIONAL MOBILITY, BALANCE, AND ENDURANCE: ICD-10-CM

## 2024-02-01 PROCEDURE — 97113 AQUATIC THERAPY/EXERCISES: CPT | Performed by: PHYSICAL THERAPIST

## 2024-02-01 NOTE — PROGRESS NOTES
Physical Therapy Daily Treatment Note    [facility] Physical Therapy Milestone  [address] [place] [address]  [phone] (phone)  [phone] (fax)    Patient: Dionne Kaiser   : 1933  Diagnosis/ICD-10 Code:  Spinal stenosis, lumbar region without neurogenic claudication [M48.061]  Referring practitioner: Parviz Navarro Jr.,*  Date of Initial Visit: Type: THERAPY  Noted: 2023  Today's Date: 2024  Patient seen for 14 sessions             Subjective Evaluation    History of Present Illness    Subjective comment: Doing okay this morning but had a rough night with L leg pain last night - took a couple of acetaminophen which only helped a little, then rubbed some CBD on leg and it finally subsided to where I could get back to sleep.  My  overflowed yesterday and had to get everything out from underneath the sink so all of that bending, reaching, and moving things may have contributed to pain last night.       Objective     AQUATIC EXERCISES     Water Walk :    forward/sidestepping/backward 25 ft x6 ea  March Walk:    2 laps w/ noodle support  Tandem Walk:               Stretch 1:       Wall Walk 30 sec to a min x2  Stretch 2:       HS stretch x30 sec B w/ SN + hip sweeps - *deferred  Stretch 3:         Figure 4 stretch seated (attempted B, but d/c due to increase in pain)  - *deferred  Stretch 4:             SKTC 15 sec x 3 ea    - *deferred  Decompression:          LN/rail support x 3 min  Abdominals:     x15 w/ LN  Hip Abd/Add:   x15 B  Hip Ext:        x15 B, small range w/ gluteal squeeze   SLR:        x15 B  Hip Circles:     x10 each direction B - *deferred  March in Place (Alternating):  15x no UE support  Leg Press: x15 B w/ Lg ring  Mini Squat:    x15  Toe/Heel Raises:       x20 - *deferred  Uni-Clock:      x10 ea leg    Step Ups from the bottom of the stairs:     x12 B  Shoulder Rows (Alternating):  15x w/ closed paddles  Paddle stirs:   10x ea  "direction  Shoulder Horizontal Ab/Adduction:     10x w/ closed paddles - *deferred  Shoulder Ab/Adduction:      15x w/ closed paddles  UE \"L\" presses            -  SLS balance R/L         10-15 sec x 2 ea  Seated at bench   Bicycle:    x2 min  Flutter:      x20        Clams:  15x              STS:     x10 w/ no UE support               LAQ + ankle DF 10 sec x 3 ea (for HS stretch)     Ended session seated at jets (in therapy pool) x 5 min for muscle relaxation / symptom reduction         Assessment & Plan       Assessment  Assessment details: Patient reports rough night with L leg pain last night but doing okay this morning.  Continued with most previous aquatic ex/activity for mobility, flexibility, and strength/stabilization.  Added march walk and paddle stirs this visit.  She noted paddle stirs were harder when moving paddles in clockwise compared to counter clockwise direction.  Emphasis on standing tall, actively engaging abdominals / gluts, and performing ex/activity with good form / quality and control to minimize compensation / strain and optimize benefit.  Demonstration and cuing provided throughout session for optimal posture, core/glut activation, and correct form/technique with ex/activity.                     Timed:  Aquatic Therapy    39     mins [id number];    Yamileth Dickey PT  Physical Therapist    [place] License: [id number]  "

## 2024-02-06 ENCOUNTER — TREATMENT (OUTPATIENT)
Dept: PHYSICAL THERAPY | Facility: CLINIC | Age: 89
End: 2024-02-06
Payer: MEDICARE

## 2024-02-06 DIAGNOSIS — Z74.09 IMPAIRED FUNCTIONAL MOBILITY, BALANCE, AND ENDURANCE: ICD-10-CM

## 2024-02-06 DIAGNOSIS — M48.061 SPINAL STENOSIS, LUMBAR REGION WITHOUT NEUROGENIC CLAUDICATION: Primary | ICD-10-CM

## 2024-02-06 PROCEDURE — 97113 AQUATIC THERAPY/EXERCISES: CPT | Performed by: PHYSICAL THERAPIST

## 2024-02-06 NOTE — PROGRESS NOTES
"Physical Therapy Daily Treatment Note    [facility] Therapy [facility]  [address] [place] [address]  [phone] (phone)  [phone] (fax)    Patient: Dionne Kaiser   : 1933  Diagnosis/ICD-10 Code:  Spinal stenosis, lumbar region without neurogenic claudication [M48.061]  Referring practitioner: Parviz Navarro Jr.,*  Date of Initial Visit: Type: THERAPY  Noted: 2023  Today's Date: 2024  Patient seen for 15 sessions             Subjective Evaluation    History of Present Illness    Subjective comment: I was hurting yesterday from ~ 3pm till I went to bed last night so I took a couple of acetaminophen at bedtime and woke up witout pain this morning.  Can't think of anything I did to aggravate it.  Doesn't seem to really be any rhyme or reason to when it hurts.       Objective       AQUATIC EXERCISES     Water Walk :    forward/sidestepping/backward 25 ft x6 ea  March Walk:    2 laps w/ noodle support  Tandem Walk:               Stretch 1:       Wall Walk 30 sec to a min x2  Stretch 2:       HS stretch x30 sec B w/ SN + hip sweeps - *deferred  Stretch 3:         Figure 4 stretch seated (attempted B, but d/c due to increase in pain)  - *deferred  Stretch 4:             SKTC 15 sec x 3 ea    - *deferred  Decompression:          LN/rail support x 3 min  Abdominals:     x15 w/ LN  Hip Abd/Add:   x15 B  Hip Ext:        x15 B, small range w/ gluteal squeeze   SLR:        x15 B  Hip Circles:     x10 each direction B - *deferred  March in Place (Alternating):  15x no UE support - *deferred  Leg Press: x15 B w/ Lg ring  Mini Squat:    x20  Toe/Heel Raises:       x20 - *deferred  Uni-Clock:      x10 ea leg    Step Ups from the bottom of the stairs:     x12 B  Shoulder Rows (Alternating):  15x w/ closed paddles  Paddle stirs:   12x ea direction  Shoulder Horizontal Ab/Adduction:     10x w/ closed paddles - *deferred  Shoulder Ab/Adduction:      15x w/ closed paddles  UE \"L\" " presses            -  SLS balance R/L         10-15 sec x 2 ea  Seated at bench   Bicycle:    x2 min  Flutter:      x20        Clams:  15x              STS:     x10 w/ no UE support               LAQ + ankle DF 10 sec x 3 ea (for HS stretch)     Ended session seated at jets (in therapy pool) x 5 min for muscle relaxation / symptom reduction    Assessment & Plan       Assessment  Assessment details: Patient reports increased pain yesterday afternoon / evening but took acetaminophen at bedtime and was fine when woke up this morning.  Continued with most previous aquatic ex/activity for mobility, flexibility, and strength/stabilization.  Increased reps on a couple of ex this visit.  Focused on upright posture, actively engaging abdominals / gluts, and performing ex/activity with good form / quality and control to reduce compensation and maximize benefit.  Demonstration and cuing provided throughout session for optimal posture, core/glut activation, and correct form/technique with ex/activity.        Plan:  Continue skilled therapy working toward transition to [facility] for independent self care in the next couple of weeks.                 Timed:  Aquatic Therapy    39     mins [id number];    Yamileth Dickey PT  Physical Therapist    [place] License: [id number]

## 2024-02-08 ENCOUNTER — TREATMENT (OUTPATIENT)
Dept: PHYSICAL THERAPY | Facility: CLINIC | Age: 89
End: 2024-02-08
Payer: MEDICARE

## 2024-02-08 DIAGNOSIS — Z74.09 IMPAIRED FUNCTIONAL MOBILITY, BALANCE, AND ENDURANCE: ICD-10-CM

## 2024-02-08 DIAGNOSIS — M48.061 SPINAL STENOSIS, LUMBAR REGION WITHOUT NEUROGENIC CLAUDICATION: Primary | ICD-10-CM

## 2024-02-08 PROCEDURE — 97113 AQUATIC THERAPY/EXERCISES: CPT | Performed by: PHYSICAL THERAPIST

## 2024-02-08 NOTE — PROGRESS NOTES
"Physical Therapy Daily Treatment Note    [facility] Therapy [facility]  [address] [place] [address]  [phone] (phone)  [phone] (fax)    Patient: Dionne Kaiser   : 1933  Diagnosis/ICD-10 Code:  Spinal stenosis, lumbar region without neurogenic claudication [M48.061]  Referring practitioner: Parviz Navarro Jr.,*  Date of Initial Visit: Type: THERAPY  Noted: 2023  Today's Date: 2024  Patient seen for 16 sessions             Subjective Evaluation    History of Present Illness    Subjective comment: I was good until about 6 o'clock after last session, then noted lateral leg pain rest of the evening - only thing I did was go to grocery. Overall taking less acetaminophen (was taking about 6 / day, lately maybe 2 / day)       Objective     AQUATIC EXERCISES    Water Walk :    forward/sidestepping/backward 25 ft x6 ea  March Walk:    2 laps w/ noodle support  Tandem Walk:    4 x 10-15ft with 1 UE rail support  Stretch 1:       Wall Walk 30 sec to a min x2  Stretch 2:       HS stretch x30 sec B w/ SN + hip sweeps - *deferred  Stretch 3:         Figure 4 stretch seated (attempted B, but d/c due to increase in pain)  - *deferred  Stretch 4:             SKTC 15 sec x 3 ea    - *deferred  Decompression:          LN/rail support x 3 min  Abdominals:     x15 w/ LN  Hip Abd/Add:   x15 B  Hip Ext:        x15 B, small range w/ gluteal squeeze   SLR:        x15 B  Hip Circles:     x10 each direction B - *deferred  March in Place (Alternating):  15x no UE support - *deferred  Leg Press: x15 B w/ Lg ring  Mini Squat:    x20  Toe/Heel Raises:       x20 - *deferred  Uni-Clock:      x10 ea leg    Step Ups from the bottom of the stairs:     x12 B  Shoulder Rows (Alternating):  15x w/ closed paddles  Paddle stirs:   12x ea direction  Shoulder Horizontal Ab/Adduction:     10x w/ closed paddles - *deferred  Shoulder Ab/Adduction:      15x w/ closed paddles  UE \"L\" presses            " -  SLS balance R/L         10-15 sec x 2 ea  Seated at bench   Bicycle:    x2 min - on own  Flutter:      x20        Clams:  x20              STS:     x10 w/ no UE support               LAQ + ankle DF 10 sec x 3 ea (for HS stretch)     Ended session seated at jets (in therapy pool) x 5 min for muscle relaxation / symptom reduction    Assessment & Plan       Assessment  Assessment details: Patient reports she still gets L leg pain some days (typically later in the day) but overall taking less acetaminophen.  Continued with most previous aquatic ex/activity for mobility, flexibility, and strength/stabilization.  Tried tandem walk along rail this visit.  She was reliant on UE rail support and exhibited tendency to watch her feet.  Instructed her to try and find a focal point ~ eye level, and use light UE rail support as able.  Encouraged her to stand tall, actively engage abdominals / gluts, and focus on good form / quality and control  to help maximize potential benefit from ex/activity.  Demonstration and cuing provided throughout session for optimal posture, core/glut activation, and correct form/technique with ex/activity.        Plan:  Continue skilled therapy working toward transition to [facility] for independent self care in the next couple of weeks.  Begin using printed copy of aq exercises as guide for ex performance to help foster increased independence..                 Timed:  Aquatic Therapy   39     mins 23312;    Yamileth Dickey PT  Physical Therapist    [place] License: [id number]

## 2024-02-13 ENCOUNTER — TREATMENT (OUTPATIENT)
Dept: PHYSICAL THERAPY | Facility: CLINIC | Age: 89
End: 2024-02-13
Payer: MEDICARE

## 2024-02-13 DIAGNOSIS — M48.061 SPINAL STENOSIS, LUMBAR REGION WITHOUT NEUROGENIC CLAUDICATION: Primary | ICD-10-CM

## 2024-02-13 DIAGNOSIS — Z74.09 IMPAIRED FUNCTIONAL MOBILITY, BALANCE, AND ENDURANCE: ICD-10-CM

## 2024-02-13 PROCEDURE — 97113 AQUATIC THERAPY/EXERCISES: CPT | Performed by: PHYSICAL THERAPIST

## 2024-02-15 ENCOUNTER — TREATMENT (OUTPATIENT)
Dept: PHYSICAL THERAPY | Facility: CLINIC | Age: 89
End: 2024-02-15
Payer: MEDICARE

## 2024-02-15 DIAGNOSIS — M48.061 SPINAL STENOSIS, LUMBAR REGION WITHOUT NEUROGENIC CLAUDICATION: Primary | ICD-10-CM

## 2024-02-15 DIAGNOSIS — Z74.09 IMPAIRED FUNCTIONAL MOBILITY, BALANCE, AND ENDURANCE: ICD-10-CM

## 2024-02-15 PROCEDURE — 97113 AQUATIC THERAPY/EXERCISES: CPT | Performed by: PHYSICAL THERAPIST

## 2024-04-25 ENCOUNTER — TRANSCRIBE ORDERS (OUTPATIENT)
Dept: ADMINISTRATIVE | Facility: HOSPITAL | Age: 89
End: 2024-04-25
Payer: MEDICARE

## 2024-04-25 DIAGNOSIS — I48.0 PAROXYSMAL ATRIAL FIBRILLATION: Primary | ICD-10-CM

## 2024-05-01 ENCOUNTER — HOSPITAL ENCOUNTER (OUTPATIENT)
Dept: CARDIOLOGY | Facility: HOSPITAL | Age: 89
Discharge: HOME | End: 2024-05-01
Admitting: INTERNAL MEDICINE
Payer: MEDICARE

## 2024-05-01 VITALS
DIASTOLIC BLOOD PRESSURE: 69 MMHG | SYSTOLIC BLOOD PRESSURE: 137 MMHG | BODY MASS INDEX: 26.98 KG/M2 | HEART RATE: 80 BPM | WEIGHT: 158 LBS | HEIGHT: 64 IN

## 2024-05-01 DIAGNOSIS — I48.0 PAROXYSMAL ATRIAL FIBRILLATION: ICD-10-CM

## 2024-05-01 LAB
AORTIC ARCH: 2.6 CM
AORTIC DIMENSIONLESS INDEX: 0.5 (DI)
ASCENDING AORTA: 2.8 CM
BH CV ECHO MEAS - ACS: 1.51 CM
BH CV ECHO MEAS - AO MAX PG: 7 MMHG
BH CV ECHO MEAS - AO MEAN PG: 3.9 MMHG
BH CV ECHO MEAS - AO ROOT DIAM: 3.2 CM
BH CV ECHO MEAS - AO V2 MAX: 132 CM/SEC
BH CV ECHO MEAS - AO V2 VTI: 25.7 CM
BH CV ECHO MEAS - AVA(I,D): 2.24 CM2
BH CV ECHO MEAS - EDV(CUBED): 82.2 ML
BH CV ECHO MEAS - EDV(MOD-SP2): 84 ML
BH CV ECHO MEAS - EDV(MOD-SP4): 83 ML
BH CV ECHO MEAS - EF(MOD-BP): 52 %
BH CV ECHO MEAS - EF(MOD-SP2): 50 %
BH CV ECHO MEAS - EF(MOD-SP4): 54.2 %
BH CV ECHO MEAS - ESV(CUBED): 25.4 ML
BH CV ECHO MEAS - ESV(MOD-SP2): 42 ML
BH CV ECHO MEAS - ESV(MOD-SP4): 38 ML
BH CV ECHO MEAS - FS: 32.4 %
BH CV ECHO MEAS - IVS/LVPW: 1.04 CM
BH CV ECHO MEAS - IVSD: 0.99 CM
BH CV ECHO MEAS - LAT PEAK E' VEL: 10.5 CM/SEC
BH CV ECHO MEAS - LV DIASTOLIC VOL/BSA (35-75): 46.9 CM2
BH CV ECHO MEAS - LV MASS(C)D: 138.7 GRAMS
BH CV ECHO MEAS - LV MAX PG: 2.27 MMHG
BH CV ECHO MEAS - LV MEAN PG: 1.02 MMHG
BH CV ECHO MEAS - LV SYSTOLIC VOL/BSA (12-30): 21.5 CM2
BH CV ECHO MEAS - LV V1 MAX: 74.9 CM/SEC
BH CV ECHO MEAS - LV V1 VTI: 17 CM
BH CV ECHO MEAS - LVIDD: 4.3 CM
BH CV ECHO MEAS - LVIDS: 2.9 CM
BH CV ECHO MEAS - LVOT AREA: 3.4 CM2
BH CV ECHO MEAS - LVOT DIAM: 2.07 CM
BH CV ECHO MEAS - LVPWD: 0.95 CM
BH CV ECHO MEAS - MED PEAK E' VEL: 6.25 CM/SEC
BH CV ECHO MEAS - MR MAX PG: 107.3 MMHG
BH CV ECHO MEAS - MR MAX VEL: 517.8 CM/SEC
BH CV ECHO MEAS - MR MEAN PG: 77.4 MMHG
BH CV ECHO MEAS - MR MEAN VEL: 419.5 CM/SEC
BH CV ECHO MEAS - MR VTI: 176.1 CM
BH CV ECHO MEAS - MV DEC SLOPE: 507.4 CM/SEC2
BH CV ECHO MEAS - MV DEC TIME: 0.14 SEC
BH CV ECHO MEAS - MV E MAX VEL: 110.1 CM/SEC
BH CV ECHO MEAS - MV MEAN PG: NORMAL MMHG
BH CV ECHO MEAS - MV P1/2T: 68.6 MSEC
BH CV ECHO MEAS - MV V2 VTI: NORMAL CM
BH CV ECHO MEAS - MVA(P1/2T): 3.2 CM2
BH CV ECHO MEAS - PA ACC SLOPE: 503.2 CM/SEC2
BH CV ECHO MEAS - PA ACC TIME: 0.09 SEC
BH CV ECHO MEAS - PA V2 MAX: 56.5 CM/SEC
BH CV ECHO MEAS - QP/QS: 0.9
BH CV ECHO MEAS - RAP SYSTOLE: 3 MMHG
BH CV ECHO MEAS - RV MAX PG: 0.82 MMHG
BH CV ECHO MEAS - RV V1 MAX: 45.2 CM/SEC
BH CV ECHO MEAS - RV V1 VTI: 10.9 CM
BH CV ECHO MEAS - RVOT DIAM: 2.45 CM
BH CV ECHO MEAS - RVSP: 32 MMHG
BH CV ECHO MEAS - SUP REN AO DIAM: 1.8 CM
BH CV ECHO MEAS - SV(LVOT): 57.5 ML
BH CV ECHO MEAS - SV(MOD-SP2): 42 ML
BH CV ECHO MEAS - SV(MOD-SP4): 45 ML
BH CV ECHO MEAS - SV(RVOT): 51.7 ML
BH CV ECHO MEAS - SVI(LVOT): 32.5 ML/M2
BH CV ECHO MEAS - SVI(MOD-SP2): 23.7 ML/M2
BH CV ECHO MEAS - SVI(MOD-SP4): 25.4 ML/M2
BH CV ECHO MEAS - TAPSE (>1.6): 1.2 CM
BH CV ECHO MEAS - TR MAX PG: 28.8 MMHG
BH CV ECHO MEAS - TR MAX VEL: 268.5 CM/SEC
BH CV ECHO MEASUREMENTS AVERAGE E/E' RATIO: 13.15
BH CV XLRA - RV BASE: 3.2 CM
BH CV XLRA - RV LENGTH: 5.7 CM
BH CV XLRA - RV MID: 2.4 CM
BH CV XLRA - TDI S': 8.01 CM/SEC
LEFT ATRIUM VOLUME INDEX: 64.4 ML/M2
STJ: 2.4 CM

## 2024-05-01 PROCEDURE — 93306 TTE W/DOPPLER COMPLETE: CPT

## 2024-07-03 ENCOUNTER — TRANSCRIBE ORDERS (OUTPATIENT)
Dept: CARDIOLOGY | Facility: CLINIC | Age: 89
End: 2024-07-03
Payer: MEDICARE

## 2024-07-03 DIAGNOSIS — I48.21 PERMANENT ATRIAL FIBRILLATION: Primary | ICD-10-CM

## 2024-07-15 ENCOUNTER — OFFICE VISIT (OUTPATIENT)
Dept: ONCOLOGY | Facility: CLINIC | Age: 89
End: 2024-07-15
Payer: MEDICARE

## 2024-07-15 ENCOUNTER — LAB (OUTPATIENT)
Dept: LAB | Facility: HOSPITAL | Age: 89
End: 2024-07-15
Payer: MEDICARE

## 2024-07-15 VITALS
RESPIRATION RATE: 16 BRPM | SYSTOLIC BLOOD PRESSURE: 155 MMHG | TEMPERATURE: 98.3 F | OXYGEN SATURATION: 95 % | WEIGHT: 152.4 LBS | HEART RATE: 96 BPM | DIASTOLIC BLOOD PRESSURE: 74 MMHG | BODY MASS INDEX: 26.02 KG/M2 | HEIGHT: 64 IN

## 2024-07-15 DIAGNOSIS — C91.10 CLL (CHRONIC LYMPHOCYTIC LEUKEMIA): ICD-10-CM

## 2024-07-15 DIAGNOSIS — C91.10 CLL (CHRONIC LYMPHOCYTIC LEUKEMIA): Primary | ICD-10-CM

## 2024-07-15 LAB
BASOPHILS # BLD AUTO: 0.03 10*3/MM3 (ref 0–0.2)
BASOPHILS NFR BLD AUTO: 0.1 % (ref 0–1.5)
DEPRECATED RDW RBC AUTO: 47.2 FL (ref 37–54)
EOSINOPHIL # BLD AUTO: 0.14 10*3/MM3 (ref 0–0.4)
EOSINOPHIL NFR BLD AUTO: 0.7 % (ref 0.3–6.2)
ERYTHROCYTE [DISTWIDTH] IN BLOOD BY AUTOMATED COUNT: 12.7 % (ref 12.3–15.4)
HCT VFR BLD AUTO: 41.6 % (ref 34–46.6)
HGB BLD-MCNC: 13.2 G/DL (ref 12–15.9)
IMM GRANULOCYTES # BLD AUTO: 0.08 10*3/MM3 (ref 0–0.05)
IMM GRANULOCYTES NFR BLD AUTO: 0.4 % (ref 0–0.5)
LYMPHOCYTES # BLD AUTO: 8.15 10*3/MM3 (ref 0.7–3.1)
LYMPHOCYTES NFR BLD AUTO: 39.7 % (ref 19.6–45.3)
MCH RBC QN AUTO: 31.9 PG (ref 26.6–33)
MCHC RBC AUTO-ENTMCNC: 31.7 G/DL (ref 31.5–35.7)
MCV RBC AUTO: 100.5 FL (ref 79–97)
MONOCYTES # BLD AUTO: 2.08 10*3/MM3 (ref 0.1–0.9)
MONOCYTES NFR BLD AUTO: 10.1 % (ref 5–12)
NEUTROPHILS NFR BLD AUTO: 10.07 10*3/MM3 (ref 1.7–7)
NEUTROPHILS NFR BLD AUTO: 49 % (ref 42.7–76)
NRBC BLD AUTO-RTO: 0 /100 WBC (ref 0–0.2)
PLATELET # BLD AUTO: 222 10*3/MM3 (ref 140–450)
PMV BLD AUTO: 10.1 FL (ref 6–12)
RBC # BLD AUTO: 4.14 10*6/MM3 (ref 3.77–5.28)
WBC NRBC COR # BLD AUTO: 20.55 10*3/MM3 (ref 3.4–10.8)

## 2024-07-15 PROCEDURE — 1126F AMNT PAIN NOTED NONE PRSNT: CPT | Performed by: INTERNAL MEDICINE

## 2024-07-15 PROCEDURE — 99213 OFFICE O/P EST LOW 20 MIN: CPT | Performed by: INTERNAL MEDICINE

## 2024-07-15 PROCEDURE — 85025 COMPLETE CBC W/AUTO DIFF WBC: CPT

## 2024-07-15 PROCEDURE — 36415 COLL VENOUS BLD VENIPUNCTURE: CPT

## 2024-07-15 PROCEDURE — G2211 COMPLEX E/M VISIT ADD ON: HCPCS | Performed by: INTERNAL MEDICINE

## 2024-07-15 NOTE — PROGRESS NOTES
Subjective .     REASON FOR FOLLOWUP:  CLL.                             HISTORY OF PRESENT ILLNESS:  The patient is a 89 y.o. year old female  who is here for follow-up with the above-mentioned history.    No new problems.  No fever, chills, weight loss, night sweats.  No unusual areas of pain    Past Medical History:   Diagnosis Date    Arrhythmia     Arthritis     Asthma     CKD (chronic kidney disease)     CLL (chronic lymphocytic leukemia)     Stage 0, trisomy 12    GERD (gastroesophageal reflux disease)     H/O Frequent PACs and PVCs     H/O Lymphocytosis     H/O Transient amnesia     HTN (hypertension)     Hyperlipidemia     Hypothyroid     Lumbar stenosis     Neuropathy     Legs    Osteopenia     Pain     H/O diffuse chest, epigastric and lower abdominal pain    Seasonal allergies     Ulcerative colitis        HEMATOLOGIC/ONCOLOGIC HISTORY:  (History from previous dates can be found in the separate document.)    MEDICATIONS    Current Outpatient Medications:     benzonatate (TESSALON) 100 MG capsule, Take 1 capsule by mouth 3 (Three) Times a Day As Needed for Cough., Disp: , Rfl:     Cholecalciferol (VITAMIN D-3 PO), Take 2,000 Units by mouth daily., Disp: , Rfl:     denosumab (Prolia) 60 MG/ML solution prefilled syringe syringe, 1 mL., Disp: , Rfl:     fluticasone (FLONASE) 50 MCG/ACT nasal spray, Flonase Allergy Relief 50 mcg/actuation nasal spray,suspension  2 puffs each nostril daily., Disp: , Rfl:     folic acid (FOLVITE) 1 MG tablet, Take 1 tablet by mouth Daily., Disp: , Rfl:     furosemide (LASIX) 40 MG tablet, Take 1 tablet by mouth Daily., Disp: , Rfl:     hydroCHLOROthiazide (MICROZIDE) 12.5 MG capsule, hydrochlorothiazide 12.5 mg capsule  Take 1 capsule every day by oral route., Disp: , Rfl:     levothyroxine (SYNTHROID, LEVOTHROID) 75 MCG tablet, Take 1 tablet by mouth Daily., Disp: , Rfl:     lidocaine (LIDODERM) 5 %, Lidoderm 5 % topical patch  APPLY 1-2 PATCH BY TRANSDERMAL ROUTE ONCE DAILY  (MAY WEAR UP TO 12HOURS.), Disp: , Rfl:     loratadine (CLARITIN) 10 MG tablet, Take 1 tablet by mouth Daily., Disp: , Rfl:     metoprolol succinate XL (TOPROL-XL) 25 MG 24 hr tablet, TAKE ONE TABLET BY MOUTH ONE TIME DAILY , Disp: 30 tablet, Rfl: 0    olmesartan (BENICAR) 40 MG tablet, olmesartan 40 mg tablet  Take 1 tablet every day by oral route., Disp: , Rfl:     omeprazole (PriLOSEC) 20 MG capsule, Take 1 capsule by mouth Daily., Disp: , Rfl:     rosuvastatin (CRESTOR) 20 MG tablet, Take  by mouth Daily., Disp: , Rfl:     sulfaSALAzine (AZULFIDINE) 500 MG tablet, Take 1 tablet by mouth 4 (Four) Times a Day., Disp: , Rfl:     traMADol (ULTRAM) 50 MG tablet, Take 1 tablet every 4 hours as needed for pain.  Maximum of 4 tablets per 24 hours., Disp: , Rfl:     allopurinol (ZYLOPRIM) 100 MG tablet, allopurinol 100 mg tablet  Take 1 tablet every day by oral route. (Patient not taking: Reported on 7/15/2024), Disp: , Rfl:     Azilsartan Medoxomil 40 MG tablet, azilsartan medoxomil 40 mg tablet  TAKE 1/2 TABLET (20 MG) BY ORAL ROUTE ONCE DAILY, Disp: , Rfl:     dextromethorphan-guaifenesin (ROBITUSSIN-DM)  MG/5ML syrup, Take 5 mL by mouth Every 4 (Four) Hours As Needed. (Patient not taking: Reported on 7/15/2024), Disp: , Rfl:     guaiFENesin-codeine (ROMILAR-AC) 100-10 MG/5ML syrup, TAKE 10ML BY MOUTH 6 TIMES A DAY (Patient not taking: Reported on 7/15/2024), Disp: , Rfl:     ipratropium-albuterol (DUO-NEB) 0.5-2.5 mg/3 ml nebulizer, 3 mL Every 6 (Six) Hours. (Patient not taking: Reported on 7/15/2024), Disp: , Rfl:     l-methylfolate-algae-B6-B12 (METANX) 3-90.314-2-35 MG capsule capsule, Every 12 (Twelve) Hours. (Patient not taking: Reported on 7/15/2024), Disp: , Rfl:     telmisartan (MICARDIS) 40 MG tablet, Take 1 tablet by mouth Daily. (Patient not taking: Reported on 7/15/2024), Disp: , Rfl:     ALLERGIES:     Allergies   Allergen Reactions    Penicillins Anaphylaxis       SOCIAL HISTORY:       Social  "History     Socioeconomic History    Marital status:    Tobacco Use    Smoking status: Former     Current packs/day: 0.00     Average packs/day: 1.5 packs/day for 40.0 years (60.0 ttl pk-yrs)     Types: Cigarettes     Start date:      Quit date:      Years since quittin.5    Smokeless tobacco: Former   Substance and Sexual Activity    Alcohol use: Yes     Alcohol/week: 7.0 standard drinks of alcohol     Types: 7 Glasses of wine per week     Comment: 1 drink daily    Drug use: Never    Sexual activity: Defer         FAMILY HISTORY:  Cancer-related family history includes Colon cancer in her mother.    REVIEW OF SYSTEMS:  Review of Systems   Constitutional:  Negative for activity change.   HENT:  Negative for nosebleeds and trouble swallowing.    Respiratory:  Negative for shortness of breath and wheezing.    Cardiovascular:  Negative for chest pain and palpitations.   Gastrointestinal:  Negative for constipation, diarrhea and nausea.   Genitourinary:  Negative for dysuria and hematuria.   Musculoskeletal:  Negative for arthralgias and myalgias.   Skin:  Negative for rash and wound.   Neurological:  Negative for seizures and syncope.   Hematological:  Negative for adenopathy. Does not bruise/bleed easily.   Psychiatric/Behavioral:  Negative for confusion.       Objective    Vitals:    07/15/24 0942   BP: 155/74   Pulse: 96   Resp: 16   Temp: 98.3 °F (36.8 °C)   TempSrc: Oral   SpO2: 95%   Weight: 69.1 kg (152 lb 6.4 oz)   Height: 162.6 cm (64.02\")   PainSc: 0-No pain         7/15/2024     9:49 AM   Current Status   ECOG score 1      PHYSICAL EXAM:          CONSTITUTIONAL:  Vital signs reviewed.  No distress, looks comfortable.  EYES:  Conjunctiva and lids unremarkable.  PERRLA  EARS,NOSE,MOUTH,THROAT:  Ears and nose appear unremarkable.  Lips, teeth, gums appear unremarkable.  RESPIRATORY:  Normal respiratory effort.  Lungs clear to auscultation bilaterally.  CARDIOVASCULAR:  Normal S1, S2.  No " murmurs rubs or gallops.  No significant lower extremity edema.  GASTROINTESTINAL: Abdomen appears unremarkable.  Nontender.  No hepatomegaly.  No splenomegaly.  LYMPHATIC:  No cervical, supraclavicular, axillary lymphadenopathy.  SKIN:  Warm.  No rashes.  PSYCHIATRIC:  Normal judgment and insight.  Normal mood and affect.            RECENT LABS:        WBC   Date/Time Value Ref Range Status   07/15/2024 09:25 AM 20.55 (H) 3.40 - 10.80 10*3/mm3 Final     Hemoglobin   Date/Time Value Ref Range Status   07/15/2024 09:25 AM 13.2 12.0 - 15.9 g/dL Final     Platelets   Date/Time Value Ref Range Status   07/15/2024 09:25  140 - 450 10*3/mm3 Final       Assessment/Plan     ASSESSMENT:  CLL (chronic lymphocytic leukemia)  - CBC & Differential  - CBC & Differential        *CLL, stage 0, trisomy 12 (prognostic significance not clear).  Has not required any treatment.  WBC 9 in 2012, 11 in 2013.   13--15 [id number].  Continues to have no lymphadenopathy or splenomegaly.  CLL remains stable.  Continuing to be stable.    *Leukocytosis, due to lymphocytosis.  WBC 20.6, from 16.5, from 16.5, 18.9, from 17.5, from 17.9, from 16.7, from 15.3, from 18.6, from 22.2    *Lymphocytosis.  Due to CLL.  Stable.  Monitor.  ALC 8150, from 8530, from 9450, from 9720, from 10,060, from 9970, from 8490, from 9090    *Macrocytosis.  .5, from 100.5, from 98.3, from 96.2, from 96.5, from 96.9, from 95.2    *Non-drenching, intermittent, Night sweats.    This does not appear to be due to CLL since everything else looks stable/asymptomatic.   10/27/2021 night sweats are not drenching, and almost none for the past month  5/2/2022: Denies night sweats  11/21/2022: Denies night sweats  7/12/2023: Denies night sweats  1/8/2024: Denies night sweats  7/15/2024: Denies night sweats    *Overweight.  Being overweight is a risk factor for malignancies.  Ideally, lose weight  Body mass index is 26.15 kg/m².  BMI 25 to <30 is overweight  BMI 30 to <35  is class 1 obesity  BMI 35 to <40 is class 2 obesity  BMI 40 or higher is class 3 obesity   Remaining slightly overweight.  Ideally, lose weight    PLAN:  M.D. CBC 6 months  (I have previously offered annual follow-up but she prefers 6 months)

## 2024-07-30 NOTE — PROGRESS NOTES
RM:________    Referral Provider: Parviz Navarro Jr.* Parviz Navarro Jr., MD    NEW PATIENT/ CONSULT  PREVIOUS CARDIOLOGIST: ______________________________    CARDIAC TESTING: __________________________________________________    : 1933   AGE: 89 y.o.    2024  REASON FOR VISIT/  CC:      WT: ____________ BP: __________L __________R/ HR_______________    ALLERGIES:  Penicillins  SMOKING HISTORY  Social History     Tobacco Use    Smoking status: Former     Current packs/day: 0.00     Average packs/day: 1.5 packs/day for 40.0 years (60.0 ttl pk-yrs)     Types: Cigarettes     Start date:      Quit date:      Years since quittin.6    Smokeless tobacco: Former   Substance Use Topics    Alcohol use: Yes     Alcohol/week: 7.0 standard drinks of alcohol     Types: 7 Glasses of wine per week     Comment: 1 drink daily    Drug use: Never          H/O: MI_____   STROKE________   GOUT_____   ANEMIA______     CAROTID________ HIV____ CAD_______ HYPERCHOL _____    H/O: CHF _____   RF____ DM___ HTN_______PVD____THYROID DISEASE_______    PMH: GI ____   HEPATITIS ___ KIDNEY DISEASE ___ LUNG DISEASE _______     SLEEP APNEA ____ BLOOD CLOTS ____ DVT ____ VEIN STRIPPING ___________      STOP BANG _________ (CARDIO ONCOLOGY ONLY)    CANCER _________________________________ CHEMO/ RADIATION__________

## 2024-08-05 ENCOUNTER — ANTICOAGULATION VISIT (OUTPATIENT)
Dept: PHARMACY | Facility: HOSPITAL | Age: 89
End: 2024-08-05
Payer: MEDICARE

## 2024-08-05 ENCOUNTER — OFFICE VISIT (OUTPATIENT)
Dept: CARDIOLOGY | Facility: CLINIC | Age: 89
End: 2024-08-05
Payer: MEDICARE

## 2024-08-05 VITALS
DIASTOLIC BLOOD PRESSURE: 72 MMHG | BODY MASS INDEX: 25.92 KG/M2 | WEIGHT: 151.85 LBS | HEIGHT: 64 IN | HEART RATE: 104 BPM | SYSTOLIC BLOOD PRESSURE: 126 MMHG

## 2024-08-05 DIAGNOSIS — I10 PRIMARY HYPERTENSION: ICD-10-CM

## 2024-08-05 DIAGNOSIS — I48.21 PERMANENT ATRIAL FIBRILLATION: Primary | ICD-10-CM

## 2024-08-05 PROBLEM — U07.1 COVID-19: Status: RESOLVED | Noted: 2022-05-26 | Resolved: 2024-08-05

## 2024-08-05 PROCEDURE — 99204 OFFICE O/P NEW MOD 45 MIN: CPT | Performed by: INTERNAL MEDICINE

## 2024-08-05 PROCEDURE — 1160F RVW MEDS BY RX/DR IN RCRD: CPT | Performed by: INTERNAL MEDICINE

## 2024-08-05 PROCEDURE — 1159F MED LIST DOCD IN RCRD: CPT | Performed by: INTERNAL MEDICINE

## 2024-08-05 RX ORDER — METOPROLOL SUCCINATE 25 MG/1
25 TABLET, EXTENDED RELEASE ORAL DAILY
Qty: 90 TABLET | Refills: 3 | Status: SHIPPED | OUTPATIENT
Start: 2024-08-05

## 2024-08-05 RX ORDER — APIXABAN 5 MG/1
1 TABLET, FILM COATED ORAL EVERY 12 HOURS SCHEDULED
COMMUNITY
Start: 2024-04-25 | End: 2024-08-05

## 2024-08-05 RX ORDER — WARFARIN SODIUM 2.5 MG/1
TABLET ORAL
Qty: 30 TABLET | Refills: 0 | Status: SHIPPED | OUTPATIENT
Start: 2024-08-05

## 2024-08-05 NOTE — PROGRESS NOTES
Anticoagulation Clinic Progress Note    Anticoagulation Summary  As of 8/5/2024      INR goal:  2.0-3.0   TTR:  --   INR used for dosing:  No new INR was available at the time of this encounter.   Warfarin maintenance plan:  No maintenance plan   Next INR check:  8/9/2024   Target end date:      Indications    Permanent atrial fibrillation [I48.21]                 Anticoagulation Episode Summary       INR check location:      Preferred lab:      Send INR reminders to:   [facility]    Comments:            Anticoagulation Care Providers       Provider Role Specialty Phone number    Troy Verde MD Referring Cardiology [phone]            Clinic Interview:  Patient Findings    New patient transitioning to warfarin due to high cost of   Eliquis. Will start warfarin 2.5 mg by mouth nightly starting today,   8/5/24, and coming to clinic for new patient appointment, 8/9/24.         INR History:      8/5/2024     2:26 PM   Anticoagulation Monitoring   INR Goal 2.0-3.0   Last Week Total 0 mg   Next Week Total 10 mg   Sun -   Mon 2.5 mg (8/5)   Tue 2.5 mg (8/6)   Wed 2.5 mg (8/7)   Thu 2.5 mg (8/8)   Fri -   Sat -   Visit Report Report       Plan:  1.Will instruct Dionne Kaiser to start taking warfarin 2.5 mg by mouth nightly starting 8/5/24.    2. Follow up in 4 days for new patient appointment and first INR check.   3. They have been instructed to call if any changes in medications, doses, concerns, etc. Patient expresses understanding and has no further questions at this time.    Gail [facility]

## 2024-08-05 NOTE — PROGRESS NOTES
Date of Office Visit: 24  Encounter Provider: Troy Verde MD  Place of Service: [facility] CARDIOLOGY  Patient Name: Dionne Kaiser  :1933    Chief Complaint   Patient presents with    Atrial Fibrillation   :     HPI:     Ms. Kaiser is 89 y.o. and presents today to establish care.     She has a history of PVCs and PACs. She was recently diagnosed with asymptomatic atrial fibrillation. She took apixaban for a month (she used the free card) but will be unable to afford it going forward. She had no issues with it while she took it. She was also prescribed metoprolol but only took that for one month and it ran out. She tolerated it well.    She has no cardiac symptoms or limitations. She denies palps, chest pain, dyspnea, LH, syncope, or edema. She had an echo in  that showed normal LVSF and severe left atrial dilation.     Past Medical History:   Diagnosis Date    Arthritis     Asthma     CKD (chronic kidney disease) stage 3, GFR 30-59 ml/min     CLL (chronic lymphocytic leukemia)     Stage 0, trisomy 12    GERD (gastroesophageal reflux disease)     H/O Frequent PACs and PVCs     H/O Lymphocytosis     H/O Transient amnesia     HTN (hypertension)     Hyperlipidemia     Hypothyroid     Lumbar stenosis     Neuropathy     Legs    Osteopenia     Seasonal allergies     Ulcerative colitis        Past Surgical History:   Procedure Laterality Date    SKIN CANCER EXCISION  2015    TONSILLECTOMY         Social History     Socioeconomic History    Marital status:     Number of children: 3   Tobacco Use    Smoking status: Former     Current packs/day: 0.00     Average packs/day: 1.5 packs/day for 40.0 years (60.0 ttl pk-yrs)     Types: Cigarettes     Start date:      Quit date:      Years since quittin.6     Passive exposure: Never    Smokeless tobacco: Never   Vaping Use    Vaping status: Never Used   Substance and Sexual Activity    Alcohol use: Yes      Alcohol/week: 7.0 standard drinks of alcohol     Types: 7 Glasses of wine per week     Comment: 1 drink daily    Drug use: Never    Sexual activity: Defer       Family History   Problem Relation Age of Onset    Colon cancer Mother     Alcohol abuse Father     Other Neg Hx         Negative for premature coronary disease       Review of Systems   Musculoskeletal:  Positive for arthritis and myalgias.   All other systems reviewed and are negative.      Allergies   Allergen Reactions    Penicillins Anaphylaxis         Current Outpatient Medications:     Cholecalciferol (VITAMIN D-3 PO), Take 2,000 Units by mouth daily., Disp: , Rfl:     denosumab (Prolia) 60 MG/ML solution prefilled syringe syringe, Inject 1 mL under the skin into the appropriate area as directed Every 6 (Six) Months., Disp: , Rfl:     fluticasone (FLONASE) 50 MCG/ACT nasal spray, Flonase Allergy Relief 50 mcg/actuation nasal spray,suspension  2 puffs each nostril daily., Disp: , Rfl:     folic acid (FOLVITE) 1 MG tablet, Take 1 tablet by mouth Daily., Disp: , Rfl:     levothyroxine (SYNTHROID, LEVOTHROID) 75 MCG tablet, Take 1 tablet by mouth Daily., Disp: , Rfl:     lidocaine (LIDODERM) 5 %, Lidoderm 5 % topical patch  APPLY 1-2 PATCH BY TRANSDERMAL ROUTE ONCE DAILY (MAY WEAR UP TO 12HOURS.), Disp: , Rfl:     loratadine (CLARITIN) 10 MG tablet, Take 1 tablet by mouth Daily., Disp: , Rfl:     metoprolol succinate XL (TOPROL-XL) 25 MG 24 hr tablet, Take 1 tablet by mouth Daily., Disp: 90 tablet, Rfl: 3    omeprazole (PriLOSEC) 20 MG capsule, Take 1 capsule by mouth Daily., Disp: , Rfl:     rosuvastatin (CRESTOR) 20 MG tablet, Take 2 tablets by mouth Daily., Disp: , Rfl:     sulfaSALAzine (AZULFIDINE) 500 MG tablet, Take 1 tablet by mouth 4 (Four) Times a Day., Disp: , Rfl:     telmisartan (MICARDIS) 40 MG tablet, Take 1 tablet by mouth Daily., Disp: , Rfl:     allopurinol (ZYLOPRIM) 100 MG tablet, allopurinol 100 mg tablet  Take 1 tablet every day by  "oral route. (Patient not taking: Reported on 8/5/2024), Disp: , Rfl:     hydroCHLOROthiazide (MICROZIDE) 12.5 MG capsule, hydrochlorothiazide 12.5 mg capsule  Take 1 capsule every day by oral route. (Patient not taking: Reported on 8/5/2024), Disp: , Rfl:     traMADol (ULTRAM) 50 MG tablet, Take 1 tablet every 4 hours as needed for pain.  Maximum of 4 tablets per 24 hours. (Patient not taking: Reported on 8/5/2024), Disp: , Rfl:       Objective:     Vitals:    08/05/24 1148   BP: 126/72   BP Location: Right arm   Pulse: 104   Weight: 68.9 kg (151 lb 13.6 oz)   Height: 162.6 cm (64.02\")     Body mass index is 26.05 kg/m².    Vitals reviewed.   Constitutional:       Appearance: Well-developed and not in distress.   Eyes:      Conjunctiva/sclera: Conjunctivae normal.   HENT:      Head: Normocephalic.      Nose: Nose normal.   Neck:      Thyroid: Thyroid normal.      Vascular: No JVD. JVD normal.      Lymphadenopathy: No cervical adenopathy.   Pulmonary:      Effort: Pulmonary effort is normal.      Breath sounds: Normal breath sounds.   Cardiovascular:      Tachycardia present. Irregularly irregular rhythm.      Murmurs: There is no murmur.   Pulses:     Intact distal pulses.   Edema:     Peripheral edema absent.   Abdominal:      Palpations: Abdomen is soft.      Tenderness: There is no abdominal tenderness.   Musculoskeletal: Normal range of motion.      Cervical back: Normal range of motion. Skin:     General: Skin is warm and dry.   Neurological:      General: No focal deficit present.      Mental Status: Oriented to person, place, and time.      Cranial Nerves: No cranial nerve deficit.   Psychiatric:         Behavior: Behavior normal.         Thought Content: Thought content normal.         Judgment: Judgment normal.         Procedures      Assessment:       Diagnosis Plan   1. Permanent atrial fibrillation  Ambulatory Referral to Anticoagulation Monitoring    Holter Monitor - 24 Hour      2. Primary hypertension "               Plan:       Ms Kaiser has asymptomatic atrial fibrillation. She has severe left atrial dilation. Her rate is suboptimally controlled; I asked her to resume metoprolol.     Her CHADSVASc = 4, and she is still quite sharp and independent. I feel the benefits of OAC outweigh the risks. Apixaban is too costly. I have referred her to our warfarin clinic. Alternately, we could look into dabigatran if warfarin is difficult to manage.    Sincerely,       Troy Verde MD

## 2024-08-05 NOTE — PROGRESS NOTES
I have supervised and reviewed the notes, assessments, and/or procedures performed. The documented assessment and plan were developed cooperatively. I concur with the documentation of this patient encounter.    Aleksandr Kidd, PharmD

## 2024-08-09 ENCOUNTER — ANTICOAGULATION VISIT (OUTPATIENT)
Dept: PHARMACY | Facility: HOSPITAL | Age: 89
End: 2024-08-09
Payer: MEDICARE

## 2024-08-09 DIAGNOSIS — I48.21 PERMANENT ATRIAL FIBRILLATION: Primary | ICD-10-CM

## 2024-08-09 LAB
INR PPP: 1 (ref 0.91–1.09)
PROTHROMBIN TIME: 12 SECONDS (ref 10–13.8)

## 2024-08-09 PROCEDURE — 85610 PROTHROMBIN TIME: CPT

## 2024-08-09 PROCEDURE — G0463 HOSPITAL OUTPT CLINIC VISIT: HCPCS

## 2024-08-09 PROCEDURE — 36416 COLLJ CAPILLARY BLOOD SPEC: CPT

## 2024-08-09 NOTE — PROGRESS NOTES
Anticoagulation Clinic Progress Note  Anticoagulation Summary  As of 2024      INR goal:  2.0-3.0   TTR:  --   INR used for dosin.0 (2024)   Warfarin maintenance plan:  No maintenance plan   Weekly warfarin total:  0 mg   Plan last modified:  Aleksandr Kidd, PharmD (2024)   Next INR check:  2024   Target end date:      Indications    Permanent atrial fibrillation [I48.21]                 Anticoagulation Episode Summary       INR check location:      Preferred lab:      [facility]    Comments:  New patient transitioning to warfarin due to high cost of Eliquis. Will start warfarin 2.5 mg by mouth nightly starting today, 24, and coming to clinic for new patient appointment, 24.             Anticoagulation Care Providers       Provider Role Specialty Phone number    Troy Verde MD Referring Cardiology [phone]            Clinic Interview:  Patient Findings     Positives:  Change in medications    Negatives:  Signs/symptoms of thrombosis, Signs/symptoms of bleeding,   Laboratory test error suspected, Change in health, Change in alcohol use,   Change in activity, Upcoming invasive procedure, Emergency department   visit, Upcoming dental procedure, Missed doses, Extra doses, Change in   diet/appetite, Hospital admission, Bruising, Other complaints    Comments:  Reports she began taking warfarin 2.5 mg daily 3 days ago.   Reports she has been taking telmisartan and olmesartan; advised her to   contact Dr. Verde to confirm which she is supposed to take, as she   indicates that Dr. Verde provided her a prescription for olmesartan  on   24 (not documented). She reports she enjoys many vegetables in her   diet, including spinach and kale.       Clinical Outcomes     Negatives:  Major bleeding event, Thromboembolic event,   Anticoagulation-related hospital admission, Anticoagulation-related ED   visit, Anticoagulation-related fatality    Comments:   Reports she began taking warfarin 2.5 mg daily 3 days ago.   Reports she has been taking telmisartan and olmesartan; advised her to   contact Dr. Vered to confirm which she is supposed to take, as she   indicates that Dr. Verde provided her a prescription for olmesartan  on   24 (not documented). She reports she enjoys many vegetables in her   diet, including spinach and kale.         Education:  Dionne Kaiser is a new start in the [facility]. We discussed the followin) Warfarin's indication, mechanism, and dosing  2) Enforced the importance of taking warfarin as instructed and at the same time every day, preferably in the evening so that we can make dose adjustments more easily following subsequent clinic visits  3) What she should do about a missed dose; pts can take missed doses within about 12 hours of their usual scheduled dose, but she was instructed on the importance of not doubling up on doses unless told to do so by the [facility]  4) Explained possible side effects of warfarin therapy, including increased risk of bleeding, s/sx of bleeding and s/sx of any additional clots/PE/CVA.   5) Discussed monitoring of warfarin, the INR, goal INR range, and the frequency of monitoring  6) Reviewed drug/food/tobacco/EtOH interactions and provided written information covering these topics in more detail, explaining that green, leafy vegetables interact most heavily with warfarin  7) Instructed the pt not to take or discontinue any medications without informing her physician/pharmacist and reminded her to inform us of any dietary changes, as well  8) Explained that she would be coming into the clinic more frequently in these first few weeks of therapy as we try to adjust her dose and achieve a therapeutic INR x 2 consecutive readings. Once that is achieved, patient will follow up in clinic every 4 weeks, on average.    She stated no problems with transportation or scheduling  clinic appts in this manner. she expressed understanding of the information provided and has no additional questions at this time.    Dionne Kaiser was presented with a copy of the Patients Rights and Responsibilities. she expressed verbal consent and agreement to receive care in the [facility] under the current collaborative care agreement with [facility].       INR History:      8/5/2024     2:26 PM 8/9/2024    10:30 AM   Anticoagulation Monitoring   INR  1.0   INR Date  8/9/2024   INR Goal 2.0-3.0 2.0-3.0   Last Week Total 0 mg 7.5 mg   Next Week Total 10 mg 15 mg   Sun - 2.5 mg (8/11)   Mon 2.5 mg (8/5) 2.5 mg (8/12)   Tue 2.5 mg (8/6) -   Wed 2.5 mg (8/7) -   Thu 2.5 mg (8/8) -   Fri - 5 mg (8/9)   Sat - 5 mg (8/10)   Visit Report Report        Plan:  1. INR is Subtherapeutic today- see above in Anticoagulation Summary.   Will instruct Dionne Kaiser to Increase their warfarin regimen (5 mg x 2 days, 2.5 mg x 2 days) - see above in Anticoagulation Summary.  2. Follow up in 4 days to determine need for further increase in total weekly dose.   3. Patient declines warfarin refills.  4. Verbal and written information provided. Patient expresses understanding and has no further questions at this time.    Aleksandr Kidd, PharmD

## 2024-08-13 ENCOUNTER — ANTICOAGULATION VISIT (OUTPATIENT)
Dept: PHARMACY | Facility: HOSPITAL | Age: 89
End: 2024-08-13
Payer: MEDICARE

## 2024-08-13 DIAGNOSIS — I48.21 PERMANENT ATRIAL FIBRILLATION: Primary | ICD-10-CM

## 2024-08-13 LAB
INR PPP: 1.6 (ref 0.91–1.09)
PROTHROMBIN TIME: 19.6 SECONDS (ref 10–13.8)

## 2024-08-13 PROCEDURE — 36416 COLLJ CAPILLARY BLOOD SPEC: CPT

## 2024-08-13 PROCEDURE — 85610 PROTHROMBIN TIME: CPT

## 2024-08-13 PROCEDURE — G0463 HOSPITAL OUTPT CLINIC VISIT: HCPCS

## 2024-08-13 NOTE — PROGRESS NOTES
Anticoagulation Clinic Progress Note    Anticoagulation Summary  As of 2024      INR goal:  2.0-3.0   TTR:  --   INR used for dosin.6 (2024)   Warfarin maintenance plan:  5 mg every Tue, Thu, Sat; 2.5 mg all other days   Weekly warfarin total:  25 mg   Plan last modified:  Jerilyn Szymanski RPH (2024)   Next INR check:  2024   Target end date:      Indications    Permanent atrial fibrillation [I48.21]                 Anticoagulation Episode Summary       INR check location:      Preferred lab:      Send INR reminders to:  [facility] CLINICAL [place]    Comments:  New patient transitioning to warfarin due to high cost of Eliquis. Will start warfarin 2.5 mg by mouth nightly starting today, 24, and coming to clinic for new patient appointment, 24.             Anticoagulation Care Providers       Provider Role Specialty Phone number    Troy Verde MD Referring Cardiology [phone]            Clinic Interview:  Patient Findings     Negatives:  Signs/symptoms of thrombosis, Signs/symptoms of bleeding,   Laboratory test error suspected, Change in health, Change in alcohol use,   Change in activity, Upcoming invasive procedure, Emergency department   visit, Upcoming dental procedure, Missed doses, Extra doses, Change in   medications, Change in diet/appetite, Hospital admission, Bruising, Other   complaints      Clinical Outcomes     Negatives:  Major bleeding event, Thromboembolic event,   Anticoagulation-related hospital admission, Anticoagulation-related ED   visit, Anticoagulation-related fatality        INR History:      2024     2:26 PM 2024    10:30 AM 2024     8:45 AM   Anticoagulation Monitoring   INR  1.0 1.6   INR Date  2024   INR Goal 2.0-3.0 2.0-3.0 2.0-3.0   Last Week Total 0 mg 7.5 mg 22.5 mg   Next Week Total 10 mg 15 mg 25 mg   Sun - 2.5 mg () -   Mon 2.5 mg () 2.5 mg () -   Tue 2.5 mg () - 5 mg   Wed 2.5 mg () - 2.5 mg    Thu 2.5 mg (8/8) - 5 mg   Fri - 5 mg (8/9) -   Sat - 5 mg (8/10) -   Visit Report Report         Plan:  1. INR is Subtherapeutic today- see above in Anticoagulation Summary.  Will instruct Dionne Kaiser to Increase their warfarin regimen- see above in Anticoagulation Summary.  Increase to 5 mg on tues, thurs, sat and 2.5 mg AOD, rck on Friday   2. Follow up in 3 days  3. Patient declines warfarin refills.  4. Verbal and written information provided. Patient expresses understanding and has no further questions at this time.    Jerilyn Szymanski, [facility]

## 2024-08-16 ENCOUNTER — ANTICOAGULATION VISIT (OUTPATIENT)
Dept: PHARMACY | Facility: HOSPITAL | Age: 89
End: 2024-08-16
Payer: MEDICARE

## 2024-08-16 DIAGNOSIS — I48.21 PERMANENT ATRIAL FIBRILLATION: Primary | ICD-10-CM

## 2024-08-16 LAB
INR PPP: 2.3 (ref 0.91–1.09)
PROTHROMBIN TIME: 27.3 SECONDS (ref 10–13.8)

## 2024-08-16 PROCEDURE — 85610 PROTHROMBIN TIME: CPT

## 2024-08-16 PROCEDURE — G0463 HOSPITAL OUTPT CLINIC VISIT: HCPCS

## 2024-08-16 PROCEDURE — 36416 COLLJ CAPILLARY BLOOD SPEC: CPT

## 2024-08-16 NOTE — PROGRESS NOTES
I have supervised and reviewed the notes, assessments, and/or procedures performed. I personally performed the assessment and implemented the plan. I concur with the documentation of this patient encounter.    Aleksandr Kidd, PharmD

## 2024-08-16 NOTE — PROGRESS NOTES
Anticoagulation Clinic Progress Note    Anticoagulation Summary  As of 2024      INR goal:  2.0-3.0   TTR:  87.4% (1 d)   INR used for dosin.3 (2024)   Warfarin maintenance plan:  5 mg every Tue, Thu, Sat; 2.5 mg all other days   Weekly warfarin total:  25 mg   No change documented:  Estephania Monge, Pharmacy Technician   Plan last modified:  Jerilyn Szymanski RPH (2024)   Next INR check:  2024   Target end date:      Indications    Permanent atrial fibrillation [I48.21]                 Anticoagulation Episode Summary       INR check location:      Preferred lab:      Send INR reminders to:  [facility] CLINICAL [place]    Comments:  New patient transitioning to warfarin due to high cost of Eliquis. Will start warfarin 2.5 mg by mouth nightly starting today, 24, and coming to clinic for new patient appointment, 24.             Anticoagulation Care Providers       Provider Role Specialty Phone number    Troy Verde MD Referring Cardiology [phone]            Clinic Interview:  Patient Findings     Negatives:  Signs/symptoms of thrombosis, Signs/symptoms of bleeding,   Laboratory test error suspected, Change in health, Change in alcohol use,   Change in activity, Upcoming invasive procedure, Emergency department   visit, Upcoming dental procedure, Missed doses, Extra doses, Change in   medications, Change in diet/appetite, Hospital admission, Bruising, Other   complaints      Clinical Outcomes     Negatives:  Major bleeding event, Thromboembolic event,   Anticoagulation-related hospital admission, Anticoagulation-related ED   visit, Anticoagulation-related fatality        INR History:      2024     2:26 PM 2024    10:30 AM 2024     8:45 AM 2024     1:45 PM   Anticoagulation Monitoring   INR  1.0 1.6 2.3   INR Date  2024   INR Goal 2.0-3.0 2.0-3.0 2.0-3.0 2.0-3.0   Trend    Same   Last Week Total 0 mg 7.5 mg 22.5 mg 27.5 mg   Next Week  Total 10 mg 15 mg 25 mg 25 mg   Sun - 2.5 mg (8/11) - 2.5 mg   Mon 2.5 mg (8/5) 2.5 mg (8/12) - 2.5 mg   Tue 2.5 mg (8/6) - 5 mg 5 mg   Wed 2.5 mg (8/7) - 2.5 mg 2.5 mg   Thu 2.5 mg (8/8) - 5 mg 5 mg   Fri - 5 mg (8/9) - 2.5 mg   Sat - 5 mg (8/10) - 5 mg   Visit Report Report          Plan:  1. INR is therapeutic today- see above in Anticoagulation Summary.   Will instruct Dionne Kaiser to continue their warfarin regimen- see above in Anticoagulation Summary.  2. Follow up in 1 week.  3. Patient declines warfarin refills.  4. Verbal and written information provided. Patient expresses understanding and has no further questions at this time.    Estephania Monge, Pharmacy Technician

## 2024-08-23 ENCOUNTER — ANTICOAGULATION VISIT (OUTPATIENT)
Dept: PHARMACY | Facility: HOSPITAL | Age: 89
End: 2024-08-23
Payer: MEDICARE

## 2024-08-23 DIAGNOSIS — I48.21 PERMANENT ATRIAL FIBRILLATION: Primary | ICD-10-CM

## 2024-08-23 LAB
INR PPP: 2.8 (ref 0.91–1.09)
PROTHROMBIN TIME: 33.7 SECONDS (ref 10–13.8)

## 2024-08-23 PROCEDURE — 85610 PROTHROMBIN TIME: CPT

## 2024-08-23 PROCEDURE — 36416 COLLJ CAPILLARY BLOOD SPEC: CPT

## 2024-08-23 PROCEDURE — G0463 HOSPITAL OUTPT CLINIC VISIT: HCPCS

## 2024-08-23 RX ORDER — WARFARIN SODIUM 2.5 MG/1
TABLET ORAL
Qty: 130 TABLET | Refills: 1 | Status: SHIPPED | OUTPATIENT
Start: 2024-08-23

## 2024-08-23 NOTE — PROGRESS NOTES
Anticoagulation Clinic Progress Note    Anticoagulation Summary  As of 2024      INR goal:  2.0-3.0   TTR:  97.7% (1.1 wk)   INR used for dosin.8 (2024)   Warfarin maintenance plan:  5 mg every Tue, Thu, Sat; 2.5 mg all other days   Weekly warfarin total:  25 mg   No change documented:  Aleksandr Kidd, PharmD   Plan last modified:  Jerilyn Szymanski RPH (2024)   Next INR check:  2024   Target end date:      Indications    Permanent atrial fibrillation [I48.21]                 Anticoagulation Episode Summary       INR check location:      Preferred lab:      Send INR reminders to:  [facility] CLINICAL [place]    Comments:  New patient transitioning to warfarin due to high cost of Eliquis. Will start warfarin 2.5 mg by mouth nightly starting today, 24, and coming to clinic for new patient appointment, 24.             Anticoagulation Care Providers       Provider Role Specialty Phone number    Troy Verde MD Referring Cardiology [phone]            Clinic Interview:  Patient Findings     Negatives:  Signs/symptoms of thrombosis, Signs/symptoms of bleeding,   Laboratory test error suspected, Change in health, Change in alcohol use,   Change in activity, Upcoming invasive procedure, Emergency department   visit, Upcoming dental procedure, Missed doses, Extra doses, Change in   medications, Change in diet/appetite, Hospital admission, Bruising, Other   complaints      Clinical Outcomes     Negatives:  Major bleeding event, Thromboembolic event,   Anticoagulation-related hospital admission, Anticoagulation-related ED   visit, Anticoagulation-related fatality        INR History:      2024     2:26 PM 2024    10:30 AM 2024     8:45 AM 2024     1:45 PM 2024     1:45 PM   Anticoagulation Monitoring   INR  1.0 1.6 2.3 2.8   INR Date  2024   INR Goal 2.0-3.0 2.0-3.0 2.0-3.0 2.0-3.0 2.0-3.0   Trend    Same Same   Last Week Total 0 mg  7.5 mg 22.5 mg 27.5 mg 25 mg   Next Week Total 10 mg 15 mg 25 mg 25 mg 25 mg   Sun - 2.5 mg (8/11) - 2.5 mg 2.5 mg   Mon 2.5 mg (8/5) 2.5 mg (8/12) - 2.5 mg 2.5 mg   Tue 2.5 mg (8/6) - 5 mg 5 mg 5 mg   Wed 2.5 mg (8/7) - 2.5 mg 2.5 mg 2.5 mg   Thu 2.5 mg (8/8) - 5 mg 5 mg 5 mg   Fri - 5 mg (8/9) - 2.5 mg 2.5 mg   Sat - 5 mg (8/10) - 5 mg 5 mg   Visit Report Report           Plan:  1. INR is Therapeutic today- see above in Anticoagulation Summary.  Will instruct Dionne Kaiser to Continue their warfarin regimen- see above in Anticoagulation Summary.  2. Follow up in 2 weeks  3. Patient desires warfarin refills.  4. Verbal and written information provided. Patient expresses understanding and has no further questions at this time.    Aleksandr Kidd, PharmD

## 2024-09-09 ENCOUNTER — ANTICOAGULATION VISIT (OUTPATIENT)
Dept: PHARMACY | Facility: HOSPITAL | Age: 89
End: 2024-09-09
Payer: MEDICARE

## 2024-09-09 DIAGNOSIS — I48.21 PERMANENT ATRIAL FIBRILLATION: Primary | ICD-10-CM

## 2024-09-09 LAB
INR PPP: 2.5 (ref 0.91–1.09)
PROTHROMBIN TIME: 29.4 SECONDS (ref 10–13.8)

## 2024-09-09 PROCEDURE — 85610 PROTHROMBIN TIME: CPT

## 2024-09-09 PROCEDURE — 36416 COLLJ CAPILLARY BLOOD SPEC: CPT

## 2024-09-09 PROCEDURE — G0463 HOSPITAL OUTPT CLINIC VISIT: HCPCS

## 2024-09-09 NOTE — PROGRESS NOTES
I have supervised and reviewed the notes, assessments, and/or procedures performed. The documented assessment and plan were developed cooperatively, and the plan was implemented in my presence. I concur with the documentation of this patient encounter.    Meri Davila, LindaD

## 2024-09-09 NOTE — PROGRESS NOTES
Anticoagulation Clinic Progress Note    Anticoagulation Summary  As of 2024      INR goal:  2.0-3.0   TTR:  99.2% (3.6 wk)   INR used for dosin.5 (2024)   Warfarin maintenance plan:  5 mg every Tue, Thu, Sat; 2.5 mg all other days   Weekly warfarin total:  25 mg   No change documented:  Melida Avendaño, Pharmacy Intern   Plan last modified:  Jerilyn Szymanski RPH (2024)   Next INR check:  10/7/2024   Target end date:      Indications    Permanent atrial fibrillation [I48.21]                 Anticoagulation Episode Summary       INR check location:      Preferred lab:      [facility] [place]    Comments:  New patient transitioning to warfarin due to high cost of Eliquis. Will start warfarin 2.5 mg by mouth nightly starting today, 24, and coming to clinic for new patient appointment, 24.             Anticoagulation Care Providers       Provider Role Specialty Phone number    Troy Verde MD Referring Cardiology [phone]            Clinic Interview:  Patient Findings     Negatives:  Signs/symptoms of thrombosis, Signs/symptoms of bleeding,   Laboratory test error suspected, Change in health, Change in alcohol use,   Change in activity, Upcoming invasive procedure, Emergency department   visit, Upcoming dental procedure, Missed doses, Extra doses, Change in   medications, Change in diet/appetite, Hospital admission, Bruising, Other   complaints      Clinical Outcomes     Negatives:  Major bleeding event, Thromboembolic event,   Anticoagulation-related hospital admission, Anticoagulation-related ED   visit, Anticoagulation-related fatality        INR History:      2024     2:26 PM 2024    10:30 AM 2024     8:45 AM 2024     1:45 PM 2024     1:45 PM 2024     8:30 AM   Anticoagulation Monitoring   INR  1.0 1.6 2.3 2.8 2.5   INR Date  2024   INR Goal 2.0-3.0 2.0-3.0 2.0-3.0 2.0-3.0 2.0-3.0  2.0-3.0   Trend    Same Same Same   Last Week Total 0 mg 7.5 mg 22.5 mg 27.5 mg 25 mg 25 mg   Next Week Total 10 mg 15 mg 25 mg 25 mg 25 mg 25 mg   Sun - 2.5 mg (8/11) - 2.5 mg 2.5 mg 2.5 mg   Mon 2.5 mg (8/5) 2.5 mg (8/12) - 2.5 mg 2.5 mg 2.5 mg   Tue 2.5 mg (8/6) - 5 mg 5 mg 5 mg 5 mg   Wed 2.5 mg (8/7) - 2.5 mg 2.5 mg 2.5 mg 2.5 mg   Thu 2.5 mg (8/8) - 5 mg 5 mg 5 mg 5 mg   Fri - 5 mg (8/9) - 2.5 mg 2.5 mg 2.5 mg   Sat - 5 mg (8/10) - 5 mg 5 mg 5 mg   Visit Report Report            Plan:  1. INR is Therapeutic today- see above in Anticoagulation Summary.   Will instruct Dionne Kaiser to Continue their warfarin regimen- see above in Anticoagulation Summary.  2. Follow up in 4 weeks  3. They have been instructed to call if any changes in medications, doses, concerns, etc. Patient expresses understanding and has no further questions at this time.    Melida Avendaño, Pharmacy Intern

## 2024-09-26 ENCOUNTER — ANTICOAGULATION VISIT (OUTPATIENT)
Dept: PHARMACY | Facility: HOSPITAL | Age: 89
End: 2024-09-26
Payer: MEDICARE

## 2024-09-26 DIAGNOSIS — I48.21 PERMANENT ATRIAL FIBRILLATION: Primary | ICD-10-CM

## 2024-09-26 LAB
INR PPP: 3.6 (ref 0.91–1.09)
PROTHROMBIN TIME: 43.3 SECONDS (ref 10–13.8)

## 2024-09-26 PROCEDURE — 36416 COLLJ CAPILLARY BLOOD SPEC: CPT

## 2024-09-26 PROCEDURE — G0463 HOSPITAL OUTPT CLINIC VISIT: HCPCS

## 2024-09-26 PROCEDURE — 85610 PROTHROMBIN TIME: CPT

## 2024-10-22 ENCOUNTER — ANTICOAGULATION VISIT (OUTPATIENT)
Dept: PHARMACY | Facility: HOSPITAL | Age: 89
End: 2024-10-22
Payer: MEDICARE

## 2024-10-22 DIAGNOSIS — I48.21 PERMANENT ATRIAL FIBRILLATION: Primary | ICD-10-CM

## 2024-10-22 LAB
INR PPP: 4 (ref 0.91–1.09)
PROTHROMBIN TIME: 47.6 SECONDS (ref 10–13.8)

## 2024-10-22 PROCEDURE — 85610 PROTHROMBIN TIME: CPT

## 2024-10-22 PROCEDURE — G0463 HOSPITAL OUTPT CLINIC VISIT: HCPCS

## 2024-10-22 PROCEDURE — 36416 COLLJ CAPILLARY BLOOD SPEC: CPT

## 2024-10-22 NOTE — PROGRESS NOTES
Anticoagulation Clinic Progress Note    Anticoagulation Summary  As of 10/22/2024      INR goal:  2.0-3.0   TTR:  48.1% (2.3 mo)   INR used for dosin.0 (10/22/2024)   Warfarin maintenance plan:  5 mg every Tue, Sat; 2.5 mg all other days   Weekly warfarin total:  22.5 mg   Plan last modified:  Aleksandr Kidd, PharmD (10/22/2024)   Next INR check:  2024   Target end date:  --    Indications    Permanent atrial fibrillation [I48.21]                 Anticoagulation Episode Summary       INR check location:  --    Preferred lab:  --    Send INR reminders to:   [facility]    Comments:  New patient transitioning to warfarin due to high cost of Eliquis.             Anticoagulation Care Providers       Provider Role Specialty Phone number    Troy Verde MD Referring Cardiology [phone]            Clinic Interview:  Patient Findings     Negatives:  Signs/symptoms of thrombosis, Signs/symptoms of bleeding,   Laboratory test error suspected, Change in health, Change in alcohol use,   Change in activity, Upcoming invasive procedure, Emergency department   visit, Upcoming dental procedure, Missed doses, Extra doses, Change in   medications, Change in diet/appetite, Hospital admission, Bruising, Other   complaints      Clinical Outcomes     Negatives:  Major bleeding event, Thromboembolic event,   Anticoagulation-related hospital admission, Anticoagulation-related ED   visit, Anticoagulation-related fatality        INR History:      2024    10:30 AM 2024     8:45 AM 2024     1:45 PM 2024     1:45 PM 2024     8:30 AM 2024     8:45 AM 10/22/2024     9:45 AM   Anticoagulation Monitoring   INR 1.0 1.6 2.3 2.8 2.5 3.6 4.0   INR Date 2024 2024 2024 2024 2024 2024 10/22/2024   INR Goal 2.0-3.0 2.0-3.0 2.0-3.0 2.0-3.0 2.0-3.0 2.0-3.0 2.0-3.0   Trend   Same Same Same Same Down   Last Week Total 7.5 mg 22.5 mg 27.5 mg 25 mg 25 mg 25 mg 25 mg   Next  Week Total 15 mg 25 mg 25 mg 25 mg 25 mg 22.5 mg 17.5 mg   Sun 2.5 mg (8/11) - 2.5 mg 2.5 mg 2.5 mg 2.5 mg 2.5 mg   Mon 2.5 mg (8/12) - 2.5 mg 2.5 mg 2.5 mg 2.5 mg 2.5 mg   Tue - 5 mg 5 mg 5 mg 5 mg 5 mg Hold (10/22); Otherwise 5 mg   Wed - 2.5 mg 2.5 mg 2.5 mg 2.5 mg 2.5 mg 2.5 mg   Thu - 5 mg 5 mg 5 mg 5 mg 2.5 mg (9/26); Otherwise 5 mg 2.5 mg   Fri 5 mg (8/9) - 2.5 mg 2.5 mg 2.5 mg 2.5 mg 2.5 mg   Sat 5 mg (8/10) - 5 mg 5 mg 5 mg 5 mg 5 mg       Plan:  1. INR is Supratherapeutic today- see above in Anticoagulation Summary.  Will instruct Dionne Kaiser to Change their warfarin regimen (HOLD today, then decrease to 5 mg Tues/Sat, 2.5 mg all other days) - see above in Anticoagulation Summary.  2. Follow up in 2 weeks   3. Patient declines warfarin refills.  4. Verbal and written information provided. Patient expresses understanding and has no further questions at this time.    Aleksandr Kidd, PharmD

## 2024-11-05 ENCOUNTER — ANTICOAGULATION VISIT (OUTPATIENT)
Dept: PHARMACY | Facility: HOSPITAL | Age: 89
End: 2024-11-05
Payer: MEDICARE

## 2024-11-05 DIAGNOSIS — I48.21 PERMANENT ATRIAL FIBRILLATION: Primary | ICD-10-CM

## 2024-11-05 LAB
INR PPP: 2.7 (ref 0.91–1.09)
PROTHROMBIN TIME: 32 SECONDS (ref 10–13.8)

## 2024-11-05 PROCEDURE — 85610 PROTHROMBIN TIME: CPT

## 2024-11-05 PROCEDURE — 36416 COLLJ CAPILLARY BLOOD SPEC: CPT

## 2024-11-05 PROCEDURE — G0463 HOSPITAL OUTPT CLINIC VISIT: HCPCS

## 2024-11-05 NOTE — PROGRESS NOTES
Anticoagulation Clinic Progress Note    Anticoagulation Summary  As of 2024      INR goal:  2.0-3.0   TTR:  43.8% (2.7 mo)   INR used for dosin.7 (2024)   Warfarin maintenance plan:  5 mg every Tue, Sat; 2.5 mg all other days   Weekly warfarin total:  22.5 mg   No change documented:  Aleksandr Kidd, [place]   Plan last modified:  Aleksandr Kidd PharmD (10/22/2024)   Next INR check:  2024   Target end date:  --    Indications    Permanent atrial fibrillation [I48.21]                 Anticoagulation Episode Summary       INR check location:  --    Preferred lab:  --    [facility]    Comments:  New patient transitioning to warfarin due to high cost of Eliquis.             Anticoagulation Care Providers       Provider Role Specialty Phone number    Troy Verde MD Referring Cardiology [phone]            Clinic Interview:  Patient Findings     Negatives:  Signs/symptoms of thrombosis, Signs/symptoms of bleeding,   Laboratory test error suspected, Change in health, Change in alcohol use,   Change in activity, Upcoming invasive procedure, Emergency department   visit, Upcoming dental procedure, Missed doses, Extra doses, Change in   medications, Change in diet/appetite, Hospital admission, Bruising, Other   complaints      Clinical Outcomes     Negatives:  Major bleeding event, Thromboembolic event,   Anticoagulation-related hospital admission, Anticoagulation-related ED   visit, Anticoagulation-related fatality        INR History:      2024     8:45 AM 2024     1:45 PM 2024     1:45 PM 2024     8:30 AM 2024     8:45 AM 10/22/2024     9:45 AM 2024     8:45 AM   Anticoagulation Monitoring   INR 1.6 2.3 2.8 2.5 3.6 4.0 2.7   INR Date 2024 2024 2024 2024 2024 10/22/2024 2024   INR Goal 2.0-3.0 2.0-3.0 2.0-3.0 2.0-3.0 2.0-3.0 2.0-3.0 2.0-3.0   Trend  Same Same Same Same Down Same   Last Week Total 22.5  mg 27.5 mg 25 mg 25 mg 25 mg 25 mg 22.5 mg   Next Week Total 25 mg 25 mg 25 mg 25 mg 22.5 mg 17.5 mg 22.5 mg   Sun - 2.5 mg 2.5 mg 2.5 mg 2.5 mg 2.5 mg 2.5 mg   Mon - 2.5 mg 2.5 mg 2.5 mg 2.5 mg 2.5 mg 2.5 mg   Tue 5 mg 5 mg 5 mg 5 mg 5 mg Hold (10/22); Otherwise 5 mg 5 mg   Wed 2.5 mg 2.5 mg 2.5 mg 2.5 mg 2.5 mg 2.5 mg 2.5 mg   Thu 5 mg 5 mg 5 mg 5 mg 2.5 mg (9/26); Otherwise 5 mg 2.5 mg 2.5 mg   Fri - 2.5 mg 2.5 mg 2.5 mg 2.5 mg 2.5 mg 2.5 mg   Sat - 5 mg 5 mg 5 mg 5 mg 5 mg 5 mg       Plan:  1. INR is Therapeutic today- see above in Anticoagulation Summary.  Will instruct Dionne Kaiser to Continue their warfarin regimen- see above in Anticoagulation Summary.  2. Follow up in 2 weeks  3. Patient declines warfarin refills.  4. Verbal and written information provided. Patient expresses understanding and has no further questions at this time.    Aleksandr Kidd, PharmD

## 2024-11-19 ENCOUNTER — ANTICOAGULATION VISIT (OUTPATIENT)
Dept: PHARMACY | Facility: HOSPITAL | Age: 89
End: 2024-11-19
Payer: MEDICARE

## 2024-11-19 DIAGNOSIS — I48.21 PERMANENT ATRIAL FIBRILLATION: Primary | ICD-10-CM

## 2024-11-19 LAB
INR PPP: 4.5 (ref 0.91–1.09)
PROTHROMBIN TIME: 53.6 SECONDS (ref 10–13.8)

## 2024-11-19 PROCEDURE — 85610 PROTHROMBIN TIME: CPT

## 2024-11-19 PROCEDURE — 36416 COLLJ CAPILLARY BLOOD SPEC: CPT

## 2024-11-19 PROCEDURE — G0463 HOSPITAL OUTPT CLINIC VISIT: HCPCS

## 2024-11-19 NOTE — PROGRESS NOTES
Anticoagulation Clinic Progress Note    Anticoagulation Summary  As of 2024      INR goal:  2.0-3.0   TTR:  39.9% (3.2 mo)   INR used for dosin.5 (2024)   Warfarin maintenance plan:  5 mg every Tue, Sat; 2.5 mg all other days   Weekly warfarin total:  22.5 mg   Plan last modified:  Aleksandr Kidd, PharmD (10/22/2024)   Next INR check:  2024   Target end date:  --    Indications    Permanent atrial fibrillation [I48.21]                 Anticoagulation Episode Summary       INR check location:  --    Preferred lab:  --    Send INR reminders to:   [facility]    Comments:  New patient transitioning to warfarin due to high cost of Eliquis.             Anticoagulation Care Providers       Provider Role Specialty Phone number    Troy Verde MD Referring Cardiology [phone]            Clinic Interview:  Patient Findings     Negatives:  Signs/symptoms of thrombosis, Signs/symptoms of bleeding,   Laboratory test error suspected, Change in health, Change in alcohol use,   Change in activity, Upcoming invasive procedure, Emergency department   visit, Upcoming dental procedure, Missed doses, Extra doses, Change in   medications, Change in diet/appetite, Hospital admission, Bruising, Other   complaints      Clinical Outcomes     Negatives:  Major bleeding event, Thromboembolic event,   Anticoagulation-related hospital admission, Anticoagulation-related ED   visit, Anticoagulation-related fatality        INR History:      2024     1:45 PM 2024     1:45 PM 2024     8:30 AM 2024     8:45 AM 10/22/2024     9:45 AM 2024     8:45 AM 2024     8:45 AM   Anticoagulation Monitoring   INR 2.3 2.8 2.5 3.6 4.0 2.7 4.5   INR Date 2024 2024 2024 2024 10/22/2024 2024 2024   INR Goal 2.0-3.0 2.0-3.0 2.0-3.0 2.0-3.0 2.0-3.0 2.0-3.0 2.0-3.0   Trend Same Same Same Same Down Same Same   Last Week Total 27.5 mg 25 mg 25 mg 25 mg 25 mg 22.5 mg 22.5  mg   Next Week Total 25 mg 25 mg 25 mg 22.5 mg 17.5 mg 22.5 mg 17.5 mg   Sun 2.5 mg 2.5 mg 2.5 mg 2.5 mg 2.5 mg 2.5 mg 2.5 mg   Mon 2.5 mg 2.5 mg 2.5 mg 2.5 mg 2.5 mg 2.5 mg -   Tue 5 mg 5 mg 5 mg 5 mg Hold (10/22); Otherwise 5 mg 5 mg Hold (11/19)   Wed 2.5 mg 2.5 mg 2.5 mg 2.5 mg 2.5 mg 2.5 mg 2.5 mg   Thu 5 mg 5 mg 5 mg 2.5 mg (9/26); Otherwise 5 mg 2.5 mg 2.5 mg 2.5 mg   Fri 2.5 mg 2.5 mg 2.5 mg 2.5 mg 2.5 mg 2.5 mg 2.5 mg   Sat 5 mg 5 mg 5 mg 5 mg 5 mg 5 mg 5 mg       Plan:  1. INR is Supratherapeutic today- see above in Anticoagulation Summary.  Will instruct Dionne Kaiser to hold today then Continue their warfarin regimen- see above in Anticoagulation Summary.  2. Follow up in 1 week  3. Patient declines warfarin refills.  4. Verbal and written information provided. Patient expresses understanding and has no further questions at this time.    Juana Bolden [facility]

## 2024-11-25 ENCOUNTER — ANTICOAGULATION VISIT (OUTPATIENT)
Dept: PHARMACY | Facility: HOSPITAL | Age: 89
End: 2024-11-25
Payer: MEDICARE

## 2024-11-25 DIAGNOSIS — I48.21 PERMANENT ATRIAL FIBRILLATION: Primary | ICD-10-CM

## 2024-11-25 LAB
INR PPP: 2.4 (ref 0.91–1.09)
PROTHROMBIN TIME: 28.5 SECONDS (ref 10–13.8)

## 2024-11-25 PROCEDURE — 85610 PROTHROMBIN TIME: CPT

## 2024-11-25 PROCEDURE — G0463 HOSPITAL OUTPT CLINIC VISIT: HCPCS

## 2024-11-25 PROCEDURE — 36416 COLLJ CAPILLARY BLOOD SPEC: CPT

## 2024-11-25 RX ORDER — WARFARIN SODIUM 2.5 MG/1
TABLET ORAL
Qty: 130 TABLET | Refills: 1 | Status: SHIPPED | OUTPATIENT
Start: 2024-11-25

## 2024-11-25 NOTE — PROGRESS NOTES
Anticoagulation Clinic Progress Note    Anticoagulation Summary  As of 2024      INR goal:  2.0-3.0   TTR:  39.2% (3.4 mo)   INR used for dosin.4 (2024)   Warfarin maintenance plan:  5 mg every e, Sat; 2.5 mg all other days   Weekly warfarin total:  22.5 mg   No change documented:  Gail Cool RPH   Plan last modified:  Aleksandr Kidd, PharmD (10/22/2024)   Next INR check:  2024   Target end date:  --    Indications    Permanent atrial fibrillation [I48.21]                 Anticoagulation Episode Summary       INR check location:  --    Preferred lab:  --    Send INR reminders to:   [facility]    Comments:  New patient transitioning to warfarin due to high cost of Eliquis.             Anticoagulation Care Providers       Provider Role Specialty Phone number    Troy Verde MD Referring Cardiology [phone]            Clinic Interview:  Patient Findings     Negatives:  Signs/symptoms of thrombosis, Signs/symptoms of bleeding,   Laboratory test error suspected, Change in health, Change in alcohol use,   Change in activity, Upcoming invasive procedure, Emergency department   visit, Upcoming dental procedure, Missed doses, Extra doses, Change in   medications, Change in diet/appetite, Hospital admission, Bruising, Other   complaints      Clinical Outcomes     Negatives:  Major bleeding event, Thromboembolic event,   Anticoagulation-related hospital admission, Anticoagulation-related ED   visit, Anticoagulation-related fatality        INR History:      2024     1:45 PM 2024     8:30 AM 2024     8:45 AM 10/22/2024     9:45 AM 2024     8:45 AM 2024     8:45 AM 2024     9:00 AM   Anticoagulation Monitoring   INR 2.8 2.5 3.6 4.0 2.7 4.5 2.4   INR Date 2024 2024 2024 10/22/2024 2024 2024 2024   INR Goal 2.0-3.0 2.0-3.0 2.0-3.0 2.0-3.0 2.0-3.0 2.0-3.0 2.0-3.0   Trend Same Same Same Down Same Same Same   Last Week  Total 25 mg 25 mg 25 mg 25 mg 22.5 mg 22.5 mg 17.5 mg   Next Week Total 25 mg 25 mg 22.5 mg 17.5 mg 22.5 mg 17.5 mg 22.5 mg   Sun 2.5 mg 2.5 mg 2.5 mg 2.5 mg 2.5 mg 2.5 mg 2.5 mg   Mon 2.5 mg 2.5 mg 2.5 mg 2.5 mg 2.5 mg - 2.5 mg   Tue 5 mg 5 mg 5 mg Hold (10/22); Otherwise 5 mg 5 mg Hold (11/19) 5 mg   Wed 2.5 mg 2.5 mg 2.5 mg 2.5 mg 2.5 mg 2.5 mg 2.5 mg   Thu 5 mg 5 mg 2.5 mg (9/26); Otherwise 5 mg 2.5 mg 2.5 mg 2.5 mg 2.5 mg   Fri 2.5 mg 2.5 mg 2.5 mg 2.5 mg 2.5 mg 2.5 mg 2.5 mg   Sat 5 mg 5 mg 5 mg 5 mg 5 mg 5 mg 5 mg       Plan:  1. INR is Therapeutic today- see above in Anticoagulation Summary.  Will instruct Dionne SELAM Job to Continue their warfarin regimen- see above in Anticoagulation Summary.  2. Follow up in 2 weeks  3. Patient desires warfarin refills.  4. Verbal and written information provided. Patient expresses understanding and has no further questions at this time.    Gail Cool [facility]

## 2024-11-25 NOTE — PROGRESS NOTES
I have supervised and reviewed the notes, assessments, and/or procedures performed. I concur with the documentation of this patient encounter.    Aleksandr Kidd, PharmD

## 2024-12-09 ENCOUNTER — ANTICOAGULATION VISIT (OUTPATIENT)
Dept: PHARMACY | Facility: HOSPITAL | Age: 89
End: 2024-12-09
Payer: MEDICARE

## 2024-12-09 DIAGNOSIS — I48.21 PERMANENT ATRIAL FIBRILLATION: Primary | ICD-10-CM

## 2024-12-09 LAB
INR PPP: 1.7 (ref 0.91–1.09)
PROTHROMBIN TIME: 20 SECONDS (ref 10–13.8)

## 2024-12-09 PROCEDURE — G0463 HOSPITAL OUTPT CLINIC VISIT: HCPCS | Performed by: PHARMACIST

## 2024-12-09 PROCEDURE — 36416 COLLJ CAPILLARY BLOOD SPEC: CPT

## 2024-12-09 PROCEDURE — 85610 PROTHROMBIN TIME: CPT

## 2024-12-09 NOTE — PROGRESS NOTES
Anticoagulation Clinic Progress Note    Anticoagulation Summary  As of 2024      INR goal:  2.0-3.0   TTR:  41.4% (3.9 mo)   INR used for dosin.7 (2024)   Warfarin maintenance plan:  5 mg every Tue, Sat; 2.5 mg all other days   Weekly warfarin total:  22.5 mg   Plan last modified:  Aleksandr Kidd, PharmD (10/22/2024)   Next INR check:  2024   Target end date:  --    Indications    Permanent atrial fibrillation [I48.21]                 Anticoagulation Episode Summary       INR check location:  --    Preferred lab:  --    Send INR reminders to:   [facility] [place]    Comments:  New patient transitioning to warfarin due to high cost of Eliquis.             Anticoagulation Care Providers       Provider Role Specialty Phone number    Troy Verde MD Referring Cardiology [phone]            Clinic Interview:  Patient Findings     Positives:  Change in diet/appetite    Negatives:  Signs/symptoms of thrombosis, Signs/symptoms of bleeding,   Laboratory test error suspected, Change in health, Change in alcohol use,   Change in activity, Upcoming invasive procedure, Emergency department   visit, Upcoming dental procedure, Missed doses, Extra doses, Change in   medications, Hospital admission, Bruising, Other complaints    Comments:  Patient reports eating asparagus more recently and was unaware   this contains vitamin K. Will keep an eye on green intake.       Clinical Outcomes     Negatives:  Major bleeding event, Thromboembolic event,   Anticoagulation-related hospital admission, Anticoagulation-related ED   visit, Anticoagulation-related fatality    Comments:  Patient reports eating asparagus more recently and was unaware   this contains vitamin K. Will keep an eye on green intake.         INR History:      2024     8:30 AM 2024     8:45 AM 10/22/2024     9:45 AM 2024     8:45 AM 2024     8:45 AM 2024     9:00 AM 2024     8:30 AM   Anticoagulation Monitoring    INR 2.5 3.6 4.0 2.7 4.5 2.4 1.7   INR Date 9/9/2024 9/26/2024 10/22/2024 11/5/2024 11/19/2024 11/25/2024 12/9/2024   INR Goal 2.0-3.0 2.0-3.0 2.0-3.0 2.0-3.0 2.0-3.0 2.0-3.0 2.0-3.0   Trend Same Same Down Same Same Same Same   Last Week Total 25 mg 25 mg 25 mg 22.5 mg 22.5 mg 17.5 mg 22.5 mg   Next Week Total 25 mg 22.5 mg 17.5 mg 22.5 mg 17.5 mg 22.5 mg 25 mg   Sun 2.5 mg 2.5 mg 2.5 mg 2.5 mg 2.5 mg 2.5 mg 2.5 mg   Mon 2.5 mg 2.5 mg 2.5 mg 2.5 mg - 2.5 mg 5 mg (12/9); Otherwise 2.5 mg   Tue 5 mg 5 mg Hold (10/22); Otherwise 5 mg 5 mg Hold (11/19) 5 mg 5 mg   Wed 2.5 mg 2.5 mg 2.5 mg 2.5 mg 2.5 mg 2.5 mg 2.5 mg   Thu 5 mg 2.5 mg (9/26); Otherwise 5 mg 2.5 mg 2.5 mg 2.5 mg 2.5 mg 2.5 mg   Fri 2.5 mg 2.5 mg 2.5 mg 2.5 mg 2.5 mg 2.5 mg 2.5 mg   Sat 5 mg 5 mg 5 mg 5 mg 5 mg 5 mg 5 mg       Plan:  1. INR is Subtherapeutic today- see above in Anticoagulation Summary.  Will instruct Dionne Kaiser to boost with 5 mg (2 tablets) today, then continue their warfarin regimen- see above in Anticoagulation Summary.  2. Follow up in 2 weeks  3. Patient declines warfarin refills.  4. Verbal and written information provided. Patient expresses understanding and has no further questions at this time.    Mio Vital, PharmD

## 2024-12-23 ENCOUNTER — ANTICOAGULATION VISIT (OUTPATIENT)
Dept: PHARMACY | Facility: HOSPITAL | Age: 89
End: 2024-12-23
Payer: MEDICARE

## 2024-12-23 DIAGNOSIS — I48.21 PERMANENT ATRIAL FIBRILLATION: Primary | ICD-10-CM

## 2024-12-23 LAB
INR PPP: 2 (ref 0.91–1.09)
PROTHROMBIN TIME: 23.6 SECONDS (ref 10–13.8)

## 2024-12-23 PROCEDURE — G0463 HOSPITAL OUTPT CLINIC VISIT: HCPCS

## 2024-12-23 PROCEDURE — 36416 COLLJ CAPILLARY BLOOD SPEC: CPT

## 2024-12-23 PROCEDURE — 85610 PROTHROMBIN TIME: CPT

## 2024-12-23 NOTE — PROGRESS NOTES
Anticoagulation Clinic Progress Note    Anticoagulation Summary  As of 2024      INR goal:  2.0-3.0   TTR:  36.9% (4.3 mo)   INR used for dosin.0 (2024)   Warfarin maintenance plan:  5 mg every e, Sat; 2.5 mg all other days   Weekly warfarin total:  22.5 mg   No change documented:  Aleksandr Kidd, [place]   Plan last modified:  Aleksandr Kidd PharmD (10/22/2024)   Next INR check:  2025   Target end date:  --    Indications    Permanent atrial fibrillation [I48.21]                 Anticoagulation Episode Summary       INR check location:  --    Preferred lab:  --    [facility]    Comments:  New patient transitioning to warfarin due to high cost of Eliquis.             Anticoagulation Care Providers       Provider Role Specialty Phone number    Troy Verde MD Referring Cardiology [phone]            Clinic Interview:  Patient Findings     Positives:  Change in health, Change in medications, Change in   diet/appetite, Other complaints    Negatives:  Signs/symptoms of thrombosis, Signs/symptoms of bleeding,   Laboratory test error suspected, Change in alcohol use, Change in   activity, Upcoming invasive procedure, Emergency department visit,   Upcoming dental procedure, Missed doses, Extra doses, Hospital admission,   Bruising    Comments:  Reports she experienced vomiting and diarrhea x 2-3 days   beginning 24. She is feeling and eating well at present with the   exception of occasional hypotension (SBP ~80) despite having stopped her   antihypertensives 12/15/24). She also reports she stopped her omeprazole   ~3-4 weeks ago, and she has not experienced any GERD symptoms.      Clinical Outcomes     Negatives:  Major bleeding event, Thromboembolic event,   Anticoagulation-related hospital admission, Anticoagulation-related ED   visit, Anticoagulation-related fatality    Comments:  Reports she experienced vomiting and diarrhea x 2-3 days    beginning 12/9/24. She is feeling and eating well at present with the   exception of occasional hypotension (SBP ~80) despite having stopped her   antihypertensives 12/15/24). She also reports she stopped her omeprazole   ~3-4 weeks ago, and she has not experienced any GERD symptoms.        INR History:      9/26/2024     8:45 AM 10/22/2024     9:45 AM 11/5/2024     8:45 AM 11/19/2024     8:45 AM 11/25/2024     9:00 AM 12/9/2024     8:30 AM 12/23/2024     8:45 AM   Anticoagulation Monitoring   INR 3.6 4.0 2.7 4.5 2.4 1.7 2.0   INR Date 9/26/2024 10/22/2024 11/5/2024 11/19/2024 11/25/2024 12/9/2024 12/23/2024   INR Goal 2.0-3.0 2.0-3.0 2.0-3.0 2.0-3.0 2.0-3.0 2.0-3.0 2.0-3.0   Trend Same Down Same Same Same Same Same   Last Week Total 25 mg 25 mg 22.5 mg 22.5 mg 17.5 mg 22.5 mg 22.5 mg   Next Week Total 22.5 mg 17.5 mg 22.5 mg 17.5 mg 22.5 mg 25 mg 22.5 mg   Sun 2.5 mg 2.5 mg 2.5 mg 2.5 mg 2.5 mg 2.5 mg 2.5 mg   Mon 2.5 mg 2.5 mg 2.5 mg - 2.5 mg 5 mg (12/9); Otherwise 2.5 mg 2.5 mg   Tue 5 mg Hold (10/22); Otherwise 5 mg 5 mg Hold (11/19) 5 mg 5 mg 5 mg   Wed 2.5 mg 2.5 mg 2.5 mg 2.5 mg 2.5 mg 2.5 mg 2.5 mg   Thu 2.5 mg (9/26); Otherwise 5 mg 2.5 mg 2.5 mg 2.5 mg 2.5 mg 2.5 mg 2.5 mg   Fri 2.5 mg 2.5 mg 2.5 mg 2.5 mg 2.5 mg 2.5 mg 2.5 mg   Sat 5 mg 5 mg 5 mg 5 mg 5 mg 5 mg 5 mg       Plan:  1. INR is Therapeutic today- see above in Anticoagulation Summary.  Will instruct Dionne SELAM Job to Continue their warfarin regimen- see above in Anticoagulation Summary.  2. Follow up in 2 weeks  3. Patient declines warfarin refills.  4. Verbal and written information provided. Patient expresses understanding and has no further questions at this time.    Aleksandr Kidd, PharmD

## 2025-01-09 ENCOUNTER — ANTICOAGULATION VISIT (OUTPATIENT)
Dept: PHARMACY | Facility: HOSPITAL | Age: OVER 89
End: 2025-01-09
Payer: MEDICARE

## 2025-01-09 DIAGNOSIS — I48.21 PERMANENT ATRIAL FIBRILLATION: Primary | ICD-10-CM

## 2025-01-09 LAB
INR PPP: 2.4 (ref 0.91–1.09)
PROTHROMBIN TIME: 28.8 SECONDS (ref 10–13.8)

## 2025-01-09 PROCEDURE — 85610 PROTHROMBIN TIME: CPT

## 2025-01-09 PROCEDURE — G0463 HOSPITAL OUTPT CLINIC VISIT: HCPCS

## 2025-01-09 PROCEDURE — 36416 COLLJ CAPILLARY BLOOD SPEC: CPT

## 2025-01-09 NOTE — PROGRESS NOTES
Anticoagulation Clinic Progress Note    Anticoagulation Summary  As of 2025      INR goal:  2.0-3.0   TTR:  44.2% (4.9 mo)   INR used for dosin.4 (2025)   Warfarin maintenance plan:  5 mg every e, Sat; 2.5 mg all other days   Weekly warfarin total:  22.5 mg   No change documented:  Triny Turner, PharmD   Plan last modified:  Aleksandr Kidd, PharmD (10/22/2024)   Next INR check:  2025   Target end date:  --    Indications    Permanent atrial fibrillation [I48.21]                 Anticoagulation Episode Summary       INR check location:  --    Preferred lab:  --    Send INR reminders to:   [facility]    Comments:  New patient transitioning to warfarin due to high cost of Eliquis.             Anticoagulation Care Providers       Provider Role Specialty Phone number    Troy Verde MD Referring Cardiology [phone]            Clinic Interview:  Patient Findings     Positives:  Change in health, Change in medications    Negatives:  Signs/symptoms of thrombosis, Signs/symptoms of bleeding,   Laboratory test error suspected, Change in alcohol use, Change in   activity, Upcoming invasive procedure, Emergency department visit,   Upcoming dental procedure, Missed doses, Extra doses, Change in   diet/appetite, Hospital admission, Bruising, Other complaints    Comments:  Stopped hydrochlorothiazide for low blood pressure, improved   NVD from last visit      Clinical Outcomes     Negatives:  Major bleeding event, Thromboembolic event,   Anticoagulation-related hospital admission, Anticoagulation-related ED   visit, Anticoagulation-related fatality    Comments:  Stopped hydrochlorothiazide for low blood pressure, improved   NVD from last visit        INR History:      10/22/2024     9:45 AM 2024     8:45 AM 2024     8:45 AM 2024     9:00 AM 2024     8:30 AM 2024     8:45 AM 2025     9:15 AM   Anticoagulation Monitoring   INR 4.0 2.7 4.5 2.4 1.7 2.0 2.4    INR Date 10/22/2024 11/5/2024 11/19/2024 11/25/2024 12/9/2024 12/23/2024 1/9/2025   INR Goal 2.0-3.0 2.0-3.0 2.0-3.0 2.0-3.0 2.0-3.0 2.0-3.0 2.0-3.0   Trend Down Same Same Same Same Same Same   Last Week Total 25 mg 22.5 mg 22.5 mg 17.5 mg 22.5 mg 22.5 mg 22.5 mg   Next Week Total 17.5 mg 22.5 mg 17.5 mg 22.5 mg 25 mg 22.5 mg 22.5 mg   Sun 2.5 mg 2.5 mg 2.5 mg 2.5 mg 2.5 mg 2.5 mg 2.5 mg   Mon 2.5 mg 2.5 mg - 2.5 mg 5 mg (12/9); Otherwise 2.5 mg 2.5 mg 2.5 mg   Tue Hold (10/22); Otherwise 5 mg 5 mg Hold (11/19) 5 mg 5 mg 5 mg 5 mg   Wed 2.5 mg 2.5 mg 2.5 mg 2.5 mg 2.5 mg 2.5 mg 2.5 mg   Thu 2.5 mg 2.5 mg 2.5 mg 2.5 mg 2.5 mg 2.5 mg 2.5 mg   Fri 2.5 mg 2.5 mg 2.5 mg 2.5 mg 2.5 mg 2.5 mg 2.5 mg   Sat 5 mg 5 mg 5 mg 5 mg 5 mg 5 mg 5 mg       Plan:  1. INR is Therapeutic today- see above in Anticoagulation Summary.  Will instruct Dionne Kaiser to Continue their warfarin regimen- see above in Anticoagulation Summary.  2. Follow up in 2 weeks  3. Patient declines warfarin refills.  4. Verbal and written information provided. Patient expresses understanding and has no further questions at this time.    Triny Turner, LindaD

## 2025-01-23 ENCOUNTER — ANTICOAGULATION VISIT (OUTPATIENT)
Dept: PHARMACY | Facility: HOSPITAL | Age: OVER 89
End: 2025-01-23
Payer: MEDICARE

## 2025-01-23 DIAGNOSIS — I48.21 PERMANENT ATRIAL FIBRILLATION: Primary | ICD-10-CM

## 2025-01-23 LAB
INR PPP: 1.5 (ref 0.91–1.09)
PROTHROMBIN TIME: 17.7 SECONDS (ref 10–13.8)

## 2025-01-23 PROCEDURE — 85610 PROTHROMBIN TIME: CPT | Performed by: INTERNAL MEDICINE

## 2025-01-23 PROCEDURE — G0463 HOSPITAL OUTPT CLINIC VISIT: HCPCS

## 2025-01-23 NOTE — PROGRESS NOTES
Anticoagulation Clinic Progress Note    Anticoagulation Summary  As of 2025      INR goal:  2.0-3.0   TTR:  44.2% (5.4 mo)   INR used for dosin.5 (2025)   Warfarin maintenance plan:  5 mg every Tue, Thu, Sat; 2.5 mg all other days   Weekly warfarin total:  25 mg   Plan last modified:  Triny Turner, PharmD (2025)   Next INR check:  2025   Target end date:  --    Indications    Permanent atrial fibrillation [I48.21]                 Anticoagulation Episode Summary       INR check location:  --    Preferred lab:  --    Send INR reminders to:   [facility] [place]    Comments:  New patient transitioning to warfarin due to high cost of Eliquis.             Anticoagulation Care Providers       Provider Role Specialty Phone number    Troy Verde MD Referring Cardiology [phone]            Clinic Interview:  Patient Findings     Positives:  Change in diet/appetite    Negatives:  Signs/symptoms of thrombosis, Signs/symptoms of bleeding,   Laboratory test error suspected, Change in health, Change in alcohol use,   Change in activity, Upcoming invasive procedure, Emergency department   visit, Upcoming dental procedure, Missed doses, Extra doses, Change in   medications, Hospital admission, Bruising, Other complaints    Comments:  Patient endorses wanting to start eating more greens including   kale and broccoli      Clinical Outcomes     Negatives:  Major bleeding event, Thromboembolic event,   Anticoagulation-related hospital admission, Anticoagulation-related ED   visit, Anticoagulation-related fatality    Comments:  Patient endorses wanting to start eating more greens including   kale and broccoli        INR History:      2024     8:45 AM 2024     8:45 AM 2024     9:00 AM 2024     8:30 AM 2024     8:45 AM 2025     9:15 AM 2025     9:00 AM   Anticoagulation Monitoring   INR 2.7 4.5 2.4 1.7 2.0 2.4 1.5   INR Date 2024  12/9/2024 12/23/2024 1/9/2025 1/23/2025   INR Goal 2.0-3.0 2.0-3.0 2.0-3.0 2.0-3.0 2.0-3.0 2.0-3.0 2.0-3.0   Trend Same Same Same Same Same Same Up   Last Week Total 22.5 mg 22.5 mg 17.5 mg 22.5 mg 22.5 mg 22.5 mg 22.5 mg   Next Week Total 22.5 mg 17.5 mg 22.5 mg 25 mg 22.5 mg 22.5 mg 25 mg   Sun 2.5 mg 2.5 mg 2.5 mg 2.5 mg 2.5 mg 2.5 mg 2.5 mg   Mon 2.5 mg - 2.5 mg 5 mg (12/9); Otherwise 2.5 mg 2.5 mg 2.5 mg 2.5 mg   Tue 5 mg Hold (11/19) 5 mg 5 mg 5 mg 5 mg 5 mg   Wed 2.5 mg 2.5 mg 2.5 mg 2.5 mg 2.5 mg 2.5 mg 2.5 mg   Thu 2.5 mg 2.5 mg 2.5 mg 2.5 mg 2.5 mg 2.5 mg 5 mg   Fri 2.5 mg 2.5 mg 2.5 mg 2.5 mg 2.5 mg 2.5 mg 2.5 mg   Sat 5 mg 5 mg 5 mg 5 mg 5 mg 5 mg 5 mg       Plan:  1. INR is Subtherapeutic today- see above in Anticoagulation Summary.  Will instruct Dionne Kaiser to Increase their warfarin regimen- see above in Anticoagulation Summary.  2. In regards to patient's wishes to change diet, recommended she slowly increase green/vegetable intake to 2 servings/week so the clinic can make adjustments as necessary.  3. Follow up in 2 weeks  4. Patient declines warfarin refills.  5. Verbal and written information provided. Patient expresses understanding and has no further questions at this time.    Triny Turner, PharmD   abnormal

## 2025-01-27 ENCOUNTER — OFFICE VISIT (OUTPATIENT)
Dept: ONCOLOGY | Facility: CLINIC | Age: OVER 89
End: 2025-01-27
Payer: MEDICARE

## 2025-01-27 ENCOUNTER — LAB (OUTPATIENT)
Dept: LAB | Facility: HOSPITAL | Age: OVER 89
End: 2025-01-27
Payer: MEDICARE

## 2025-01-27 VITALS
DIASTOLIC BLOOD PRESSURE: 66 MMHG | BODY MASS INDEX: 26.17 KG/M2 | TEMPERATURE: 97.8 F | SYSTOLIC BLOOD PRESSURE: 137 MMHG | HEIGHT: 64 IN | OXYGEN SATURATION: 96 % | WEIGHT: 153.3 LBS | HEART RATE: 77 BPM

## 2025-01-27 DIAGNOSIS — C91.10 CLL (CHRONIC LYMPHOCYTIC LEUKEMIA): ICD-10-CM

## 2025-01-27 DIAGNOSIS — C91.10 CLL (CHRONIC LYMPHOCYTIC LEUKEMIA): Primary | ICD-10-CM

## 2025-01-27 LAB
BASOPHILS # BLD AUTO: 0.05 10*3/MM3 (ref 0–0.2)
BASOPHILS NFR BLD AUTO: 0.3 % (ref 0–1.5)
DEPRECATED RDW RBC AUTO: 46.5 FL (ref 37–54)
EOSINOPHIL # BLD AUTO: 0.12 10*3/MM3 (ref 0–0.4)
EOSINOPHIL NFR BLD AUTO: 0.7 % (ref 0.3–6.2)
ERYTHROCYTE [DISTWIDTH] IN BLOOD BY AUTOMATED COUNT: 12.8 % (ref 12.3–15.4)
HCT VFR BLD AUTO: 41.6 % (ref 34–46.6)
HGB BLD-MCNC: 13.3 G/DL (ref 12–15.9)
IMM GRANULOCYTES # BLD AUTO: 0.07 10*3/MM3 (ref 0–0.05)
IMM GRANULOCYTES NFR BLD AUTO: 0.4 % (ref 0–0.5)
LYMPHOCYTES # BLD AUTO: 7.2 10*3/MM3 (ref 0.7–3.1)
LYMPHOCYTES NFR BLD AUTO: 41.7 % (ref 19.6–45.3)
MCH RBC QN AUTO: 31.7 PG (ref 26.6–33)
MCHC RBC AUTO-ENTMCNC: 32 G/DL (ref 31.5–35.7)
MCV RBC AUTO: 99.3 FL (ref 79–97)
MONOCYTES # BLD AUTO: 1.86 10*3/MM3 (ref 0.1–0.9)
MONOCYTES NFR BLD AUTO: 10.8 % (ref 5–12)
NEUTROPHILS NFR BLD AUTO: 46.1 % (ref 42.7–76)
NEUTROPHILS NFR BLD AUTO: 7.98 10*3/MM3 (ref 1.7–7)
NRBC BLD AUTO-RTO: 0 /100 WBC (ref 0–0.2)
PLATELET # BLD AUTO: 214 10*3/MM3 (ref 140–450)
PMV BLD AUTO: 11 FL (ref 6–12)
RBC # BLD AUTO: 4.19 10*6/MM3 (ref 3.77–5.28)
WBC NRBC COR # BLD AUTO: 17.28 10*3/MM3 (ref 3.4–10.8)

## 2025-01-27 PROCEDURE — 36415 COLL VENOUS BLD VENIPUNCTURE: CPT

## 2025-01-27 PROCEDURE — 85025 COMPLETE CBC W/AUTO DIFF WBC: CPT

## 2025-01-27 NOTE — PROGRESS NOTES
Subjective .     REASON FOR FOLLOWUP:  CLL.                             HISTORY OF PRESENT ILLNESS:  The patient is a 89 y.o. year old female  who is here for follow-up with the above-mentioned history.    No new problems.  No fever, chills, weight loss, night sweats.  No unexplained areas of pain    Past Medical History:   Diagnosis Date    Arthritis     Asthma     CKD (chronic kidney disease) stage 3, GFR 30-59 ml/min     CLL (chronic lymphocytic leukemia)     Stage 0, trisomy 12    GERD (gastroesophageal reflux disease)     H/O Frequent PACs and PVCs     H/O Lymphocytosis     H/O Transient amnesia     HTN (hypertension)     Hyperlipidemia     Hypothyroid     Lumbar stenosis     Neuropathy     Legs    Osteopenia     Seasonal allergies     Ulcerative colitis        HEMATOLOGIC/ONCOLOGIC HISTORY:  (History from previous dates can be found in the separate document.)    MEDICATIONS    Current Outpatient Medications:     Cholecalciferol (VITAMIN D-3 PO), Take 2,000 Units by mouth daily., Disp: , Rfl:     denosumab (Prolia) 60 MG/ML solution prefilled syringe syringe, Inject 1 mL under the skin into the appropriate area as directed Every 6 (Six) Months., Disp: , Rfl:     fluticasone (FLONASE) 50 MCG/ACT nasal spray, 1 spray Daily As Needed for Allergies., Disp: , Rfl:     folic acid (FOLVITE) 1 MG tablet, Take 1 tablet by mouth Daily., Disp: , Rfl:     hydroCHLOROthiazide (MICROZIDE) 12.5 MG capsule, , Disp: , Rfl:     levothyroxine (SYNTHROID, LEVOTHROID) 75 MCG tablet, Take 1 tablet by mouth Daily., Disp: , Rfl:     lidocaine (LIDODERM) 5 %, Lidoderm 5 % topical patch  APPLY 1-2 PATCH BY TRANSDERMAL ROUTE ONCE DAILY (MAY WEAR UP TO 12HOURS.), Disp: , Rfl:     loratadine (CLARITIN) 10 MG tablet, Take 1 tablet by mouth Daily., Disp: , Rfl:     omeprazole (PriLOSEC) 20 MG capsule, Take 1 capsule by mouth Daily., Disp: , Rfl:     rosuvastatin (CRESTOR) 20 MG tablet, Take 2 tablets by mouth Daily., Disp: , Rfl:      sulfaSALAzine (AZULFIDINE) 500 MG tablet, Take 1 tablet by mouth 2 (Two) Times a Day., Disp: , Rfl:     telmisartan (MICARDIS) 40 MG tablet, Take 1 tablet by mouth Daily., Disp: , Rfl:     warfarin (COUMADIN) 2.5 MG tablet, Take two tablets (5 mg) by mouth on Tues, and Sat, and take one tablet (2.5 mg) by mouth all other days or as directed., Disp: 130 tablet, Rfl: 1    allopurinol (ZYLOPRIM) 100 MG tablet, allopurinol 100 mg tablet  Take 1 tablet every day by oral route. (Patient not taking: Reported on 2025), Disp: , Rfl:     metoprolol succinate XL (TOPROL-XL) 25 MG 24 hr tablet, Take 1 tablet by mouth Daily. (Patient not taking: Reported on 2025), Disp: 90 tablet, Rfl: 3    traMADol (ULTRAM) 50 MG tablet, Take 1 tablet every 4 hours as needed for pain.  Maximum of 4 tablets per 24 hours. (Patient not taking: Reported on 2025), Disp: , Rfl:     ALLERGIES:     Allergies   Allergen Reactions    Penicillins Anaphylaxis       SOCIAL HISTORY:       Social History     Socioeconomic History    Marital status:     Number of children: 3   Tobacco Use    Smoking status: Former     Current packs/day: 0.00     Average packs/day: 1.5 packs/day for 40.0 years (60.0 ttl pk-yrs)     Types: Cigarettes     Start date:      Quit date:      Years since quittin.1     Passive exposure: Never    Smokeless tobacco: Never   Vaping Use    Vaping status: Never Used   Substance and Sexual Activity    Alcohol use: Yes     Alcohol/week: 7.0 standard drinks of alcohol     Types: 7 Glasses of wine per week     Comment: 1 drink daily    Drug use: Never    Sexual activity: Defer         FAMILY HISTORY:  Cancer-related family history includes Colon cancer in her mother.    REVIEW OF SYSTEMS:  Review of Systems   Constitutional:  Negative for activity change.   HENT:  Negative for nosebleeds and trouble swallowing.    Respiratory:  Negative for shortness of breath and wheezing.    Cardiovascular:  Negative for  "chest pain and palpitations.   Gastrointestinal:  Negative for constipation, diarrhea and nausea.   Genitourinary:  Negative for dysuria and hematuria.   Musculoskeletal:  Negative for arthralgias and myalgias.   Skin:  Negative for rash and wound.   Neurological:  Negative for seizures and syncope.   Hematological:  Negative for adenopathy. Does not bruise/bleed easily.   Psychiatric/Behavioral:  Negative for confusion.       Objective    Vitals:    01/27/25 1057   BP: 137/66   Pulse: 77   Temp: 97.8 °F (36.6 °C)   TempSrc: Oral   SpO2: 96%   Weight: 69.5 kg (153 lb 4.8 oz)   Height: 162.6 cm (64.02\")   PainSc: 0-No pain         1/27/2025    10:58 AM   Current Status   ECOG score 1      PHYSICAL EXAM:        CONSTITUTIONAL:  Vital signs reviewed.  No distress, looks comfortable.  EYES:  Conjunctiva and lids unremarkable.  PERRLA  EARS,NOSE,MOUTH,THROAT:  Ears and nose appear unremarkable.  Lips, teeth, gums appear unremarkable.  RESPIRATORY:  Normal respiratory effort.  Lungs clear to auscultation bilaterally.  CARDIOVASCULAR:  Normal S1, S2.  No murmurs rubs or gallops.  No significant lower extremity edema.  GASTROINTESTINAL: Abdomen appears unremarkable.  Nontender.  No hepatomegaly.  No splenomegaly.  LYMPHATIC:  No cervical, supraclavicular, axillary lymphadenopathy.  SKIN:  Warm.  No rashes.  PSYCHIATRIC:  Normal judgment and insight.  Normal mood and affect.        RECENT LABS:        WBC   Date/Time Value Ref Range Status   01/27/2025 10:49 AM 17.28 (H) 3.40 - 10.80 10*3/mm3 Final     Hemoglobin   Date/Time Value Ref Range Status   01/27/2025 10:49 AM 13.3 12.0 - 15.9 g/dL Final     Platelets   Date/Time Value Ref Range Status   01/27/2025 10:49  140 - 450 10*3/mm3 Final       Assessment/Plan     ASSESSMENT:  There are no diagnoses linked to this encounter.        *CLL, stage 0, trisomy 12 (prognostic significance not clear).  Has not required any treatment.  WBC 9 in 2012, 11 in 2013.   13--15 " [id number].  Continues to have no lymphadenopathy or splenomegaly.  CLL remains stable.  Continues to be stable.    *Leukocytosis, due to lymphocytosis.  WBC 17.3, from 20.6, from 16.5, from 16.5, 18.9, from 17.5, from 17.9, from 16.7, from 15.3, from 18.6, from 22.2    *Lymphocytosis.  Due to CLL.  Stable.  Monitor.  ALC 7200, from 8150, from 8530, from 9450, from 9720, from 10,060, from 9970, from 8490, from 9090    *Macrocytosis.  MCV 99.3, from 100.5, from 100.5, from 98.3, from 96.2, from 96.5, from 96.9, from 95.2    *Non-drenching, intermittent, Night sweats.    This does not appear to be due to CLL since everything else looks stable/asymptomatic.   10/27/2021 night sweats are not drenching, and almost none for the past month  5/2/2022: Denies night sweats  11/21/2022: Denies night sweats  7/12/2023: Denies night sweats  1/8/2024: Denies night sweats  7/15/2024: Denies night sweats  1/27/2025: Denies night sweats    *A-fib.  Follows with Dr. Troy Verde.  On Coumadin.    *Overweight.  Being overweight is a risk factor for malignancies.  Ideally, lose weight  Body mass index is 26.3 kg/m².  BMI 25 to <30 is overweight  BMI 30 to <35 is class 1 obesity  BMI 35 to <40 is class 2 obesity  BMI 40 or higher is class 3 obesity   Remains slightly overweight.  Ideally, lose weight    PLAN:  M.D. CBC 6 months  (I have previously offered annual follow-up but she prefers 6 months)    I reviewed her last cardiology note in the last 3 INRs which were not ordered by me.

## 2025-01-30 ENCOUNTER — TRANSCRIBE ORDERS (OUTPATIENT)
Dept: PET IMAGING | Facility: HOSPITAL | Age: OVER 89
End: 2025-01-30
Payer: MEDICARE

## 2025-01-30 ENCOUNTER — HOSPITAL ENCOUNTER (OUTPATIENT)
Dept: PET IMAGING | Facility: HOSPITAL | Age: OVER 89
Discharge: HOME | End: 2025-01-30
Payer: MEDICARE

## 2025-01-30 ENCOUNTER — APPOINTMENT (OUTPATIENT)
Dept: WOMENS IMAGING | Facility: HOSPITAL | Age: OVER 89
End: 2025-01-30
Payer: MEDICARE

## 2025-01-30 DIAGNOSIS — M81.0 HIGH RISK FOR FRACTURE DUE TO OSTEOPOROSIS BY DEXA SCAN: Primary | ICD-10-CM

## 2025-01-30 PROCEDURE — 77063 BREAST TOMOSYNTHESIS BI: CPT | Performed by: RADIOLOGY

## 2025-01-30 PROCEDURE — 77067 SCR MAMMO BI INCL CAD: CPT | Performed by: RADIOLOGY

## 2025-01-30 PROCEDURE — 77080 DXA BONE DENSITY AXIAL: CPT

## 2025-02-05 ENCOUNTER — OFFICE VISIT (OUTPATIENT)
Dept: CARDIOLOGY | Facility: CLINIC | Age: OVER 89
End: 2025-02-05
Payer: MEDICARE

## 2025-02-05 VITALS
HEIGHT: 64 IN | SYSTOLIC BLOOD PRESSURE: 110 MMHG | DIASTOLIC BLOOD PRESSURE: 62 MMHG | HEART RATE: 102 BPM | BODY MASS INDEX: 26.02 KG/M2 | OXYGEN SATURATION: 97 % | WEIGHT: 152.4 LBS

## 2025-02-05 DIAGNOSIS — I44.4 LAFB (LEFT ANTERIOR FASCICULAR BLOCK): ICD-10-CM

## 2025-02-05 DIAGNOSIS — R06.09 DYSPNEA ON EXERTION: ICD-10-CM

## 2025-02-05 DIAGNOSIS — I48.21 PERMANENT ATRIAL FIBRILLATION: Primary | ICD-10-CM

## 2025-02-05 DIAGNOSIS — I49.3 PVCS (PREMATURE VENTRICULAR CONTRACTIONS): ICD-10-CM

## 2025-02-05 DIAGNOSIS — J84.9 ILD (INTERSTITIAL LUNG DISEASE): ICD-10-CM

## 2025-02-05 PROCEDURE — 93000 ELECTROCARDIOGRAM COMPLETE: CPT | Performed by: NURSE PRACTITIONER

## 2025-02-05 PROCEDURE — 1160F RVW MEDS BY RX/DR IN RCRD: CPT | Performed by: NURSE PRACTITIONER

## 2025-02-05 PROCEDURE — 1159F MED LIST DOCD IN RCRD: CPT | Performed by: NURSE PRACTITIONER

## 2025-02-05 PROCEDURE — 99214 OFFICE O/P EST MOD 30 MIN: CPT | Performed by: NURSE PRACTITIONER

## 2025-02-05 RX ORDER — METOPROLOL SUCCINATE 25 MG/1
25 TABLET, EXTENDED RELEASE ORAL NIGHTLY
Qty: 90 TABLET | Refills: 3 | Status: SHIPPED | OUTPATIENT
Start: 2025-02-05

## 2025-02-05 RX ORDER — CODEINE PHOSPHATE AND GUAIFENESIN 10; 100 MG/5ML; MG/5ML
SOLUTION ORAL
COMMUNITY
Start: 2024-12-30

## 2025-02-05 NOTE — PROGRESS NOTES
Date of Office Visit: 2025  Encounter Provider: SANTY Fermin  Place of Service: [facility] CARDIOLOGY  Patient Name: Dionne Kaiser  :1933  Primary Cardiologist: Dr. Troy Verde    Chief Complaint   Patient presents with    Atrial Fibrillation    Follow-up   :     HPI: Dionne Kaiser is a 89 y.o. female who presents today for 6-month cardiac follow-up visit.  She is a new patient to me and I reviewed her medical records.    She has known CLL, interstitial lung disease, hypothyroidism, neuropathy, ulcerative colitis, CKD, and GERD.  She follows with Dr. Carty for residual lung disease.    She has a history of PVCs and PACs.  In , she was diagnosed with asymptomatic atrial fibrillation.  Echocardiogram showed LVEF 52%, diastolic function indeterminate, severe left atrial dilation, right atrium moderately dilated, mild mitral annular calcification, mitral leaflet thickening, mild mitral regurgitation, and mild tricuspid regurgitation.  She was prescribed apixaban and metoprolol, but ran out of the medication.  The apixaban was not affordable.    In 2024, she established care with Dr. Troy Verde.  Dr. Verde worsening of the metoprolol.  Dr. Verde started her on warfarin and referred her to the [facility] anticoagulation clinic.  24-hour Holter monitor showed atrial fibrillation, average heart rate 79 bpm, and 3% PVC burden.    She presents today for follow-up visit.  Upon review of her medications, she is not taking the metoprolol and she said she did not know that she should be.  When she wakes up in the middle of the night to use the restroom, she is aware of her heartbeat.  During the daytime, she does not feel palpitations.  She reports dyspnea with exertion and going up steps.  She denies chest pain, PND, orthopnea, edema, dizziness, syncope, or bleeding.  Blood pressure normal and heart rate elevated today.  She remains on warfarin with INRs followed  at the [facility] anticoagulation clinic.      Past Medical History:   Diagnosis Date    Arthritis     Asthma     CKD (chronic kidney disease) stage 3, GFR 30-59 ml/min     CLL (chronic lymphocytic leukemia)     Stage 0, trisomy 12    GERD (gastroesophageal reflux disease)     H/O Frequent PACs and PVCs     H/O Lymphocytosis     H/O Transient amnesia     HTN (hypertension)     Hyperlipidemia     Hypothyroid     ILD (interstitial lung disease)     LAFB (left anterior fascicular block)     Lumbar stenosis     Neuropathy     Legs    Osteopenia     Permanent atrial fibrillation 2024    PVCs (premature ventricular contractions)     Seasonal allergies     Ulcerative colitis        Past Surgical History:   Procedure Laterality Date    SKIN CANCER EXCISION  2015    TONSILLECTOMY         Social History     Socioeconomic History    Marital status:     Number of children: 3   Tobacco Use    Smoking status: Former     Current packs/day: 0.00     Average packs/day: 1.5 packs/day for 40.0 years (60.0 ttl pk-yrs)     Types: Cigarettes     Start date:      Quit date:      Years since quittin.1     Passive exposure: Never    Smokeless tobacco: Never   Vaping Use    Vaping status: Never Used   Substance and Sexual Activity    Alcohol use: Yes     Alcohol/week: 7.0 standard drinks of alcohol     Types: 7 Glasses of wine per week     Comment: 1 drink daily    Drug use: Never    Sexual activity: Defer       Family History   Problem Relation Age of Onset    Colon cancer Mother     Alcohol abuse Father     Other Neg Hx         Negative for premature coronary disease       The following portion of the patient's history were reviewed and updated as appropriate: past medical history, past surgical history, past social history, past family history, allergies, current medications, and problem list.    Review of Systems   Constitutional: Negative.   Cardiovascular:  Positive for dyspnea on exertion and palpitations.  "  Hematologic/Lymphatic: Negative.    Neurological: Negative.        Allergies   Allergen Reactions    Penicillins Anaphylaxis         Current Outpatient Medications:     Cholecalciferol (VITAMIN D-3 PO), Take 2,000 Units by mouth daily., Disp: , Rfl:     denosumab (Prolia) 60 MG/ML solution prefilled syringe syringe, Inject 1 mL under the skin into the appropriate area as directed Every 6 (Six) Months., Disp: , Rfl:     fluticasone (FLONASE) 50 MCG/ACT nasal spray, 1 spray Daily As Needed for Allergies., Disp: , Rfl:     folic acid (FOLVITE) 1 MG tablet, Take 1 tablet by mouth Daily., Disp: , Rfl:     guaiFENesin-codeine (ROMILAR-AC) 100-10 MG/5ML solution/syrup, TAKE 10 ML BY MOUTH 6 TIMES A DAY, Disp: , Rfl:     hydroCHLOROthiazide (MICROZIDE) 12.5 MG capsule, , Disp: , Rfl:     levothyroxine (SYNTHROID, LEVOTHROID) 75 MCG tablet, Take 1 tablet by mouth Daily., Disp: , Rfl:     lidocaine (LIDODERM) 5 %, Lidoderm 5 % topical patch  APPLY 1-2 PATCH BY TRANSDERMAL ROUTE ONCE DAILY (MAY WEAR UP TO 12HOURS.), Disp: , Rfl:     loratadine (CLARITIN) 10 MG tablet, Take 1 tablet by mouth Daily., Disp: , Rfl:     omeprazole (PriLOSEC) 20 MG capsule, Take 1 capsule by mouth Daily., Disp: , Rfl:     rosuvastatin (CRESTOR) 20 MG tablet, Take 2 tablets by mouth Daily., Disp: , Rfl:     sulfaSALAzine (AZULFIDINE) 500 MG tablet, Take 1 tablet by mouth 2 (Two) Times a Day., Disp: , Rfl:     telmisartan (MICARDIS) 40 MG tablet, Take 1 tablet by mouth Daily., Disp: , Rfl:     warfarin (COUMADIN) 2.5 MG tablet, Take two tablets (5 mg) by mouth on Tues, and Sat, and take one tablet (2.5 mg) by mouth all other days or as directed., Disp: 130 tablet, Rfl: 1         Objective:     Vitals:    02/05/25 1113   BP: 110/62   BP Location: Right arm   Patient Position: Sitting   Cuff Size: Adult   Pulse: 102   SpO2: 97%   Weight: 69.1 kg (152 lb 6.4 oz)   Height: 162.6 cm (64.02\")     Body mass index is 26.15 kg/m².    PHYSICAL EXAM:    Vitals " Reviewed.   General Appearance: No acute distress, well developed and well nourished.   HENT: No hearing loss noted.    Respiratory: No signs of respiratory distress. Respiration rhythm and depth normal.  Clear to auscultation.   Cardiovascular:  Jugular Venous Pressure: Normal  Heart Rate and Rhythm: Irregular, irregular.  Heart Sounds: Normal S1 and S2. No S3 or S4 noted.  Murmurs: No murmurs noted. No rubs, thrills, or gallops.   Lower Extremities: No edema noted.  Musculoskeletal: Normal movement of extremities.  Skin: General appearance normal.    Psychiatric: Patient alert and oriented to person, place, and time. Speech and behavior appropriate. Normal mood and affect.       ECG 12 Lead    Date/Time: 2/5/2025 11:19 AM  Performed by: Lety Regan APRN    Authorized by: Lety Regan APRN  Comparison: compared with previous ECG from 8/5/2024  Similar to previous ECG  Rhythm: atrial fibrillation  Ectopy: unifocal PVCs  Rate: tachycardic  BPM: 102  Conduction: left anterior fascicular block  ST Segments: ST segments normal  T Waves: T waves normal  QRS axis: normal    Clinical impression: abnormal EKG            Assessment:       Diagnosis Plan   1. Permanent atrial fibrillation        2. LAFB (left anterior fascicular block)        3. PVCs (premature ventricular contractions)        4. ILD (interstitial lung disease)        5. Dyspnea on exertion               Plan:       1.  Permanent Atrial Fibrillation.  Diagnosed in 2024.  [id number] score of 4.  Apixaban was not affordable.  She remains on warfarin with INRs followed at the [facility] anticoagulation clinic.  She denies bleeding.  Heart rate elevated today.  She is aware of her heartbeat in the middle of the nighttime.  She is not taking metoprolol.  Restart metoprolol succinate 25 mg 1 tablet at bedtime.    2.  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    3.  Asymptomatic PVCs noted on EKG.  Restart metoprolol.    4/5.  Interstitial lung disease and  dyspnea with exertion.  Follows with Dr. Carty.    6.  We will check on her in a month via phone to see how she is doing with the metoprolol.  Follow-up with Dr. Verde in 6 months.    As always, it has been a pleasure to participate in your patient's care. Thank you.         Sincerely,         SANTY Madison  [facility] Cardiology      Dictated utilizing Dragon Dictation

## 2025-02-06 ENCOUNTER — ANTICOAGULATION VISIT (OUTPATIENT)
Dept: PHARMACY | Facility: HOSPITAL | Age: OVER 89
End: 2025-02-06
Payer: MEDICARE

## 2025-02-06 DIAGNOSIS — I48.21 PERMANENT ATRIAL FIBRILLATION: Primary | ICD-10-CM

## 2025-02-06 LAB
INR PPP: 2.8 (ref 0.91–1.09)
PROTHROMBIN TIME: 33.6 SECONDS (ref 10–13.8)

## 2025-02-06 PROCEDURE — 85610 PROTHROMBIN TIME: CPT

## 2025-02-06 PROCEDURE — G0463 HOSPITAL OUTPT CLINIC VISIT: HCPCS

## 2025-02-06 NOTE — PROGRESS NOTES
Anticoagulation Clinic Progress Note    Anticoagulation Summary  As of 2025      INR goal:  2.0-3.0   TTR:  45.6% (5.8 mo)   INR used for dosin.8 (2025)   Warfarin maintenance plan:  5 mg every Tue, Thu, Sat; 2.5 mg all other days   Weekly warfarin total:  25 mg   No change documented:  Aleksandr Kidd, PharmD   Plan last modified:  Triny Turner, PharmD (2025)   Next INR check:  2025   Target end date:  --    Indications    Permanent atrial fibrillation [I48.21]                 Anticoagulation Episode Summary       INR check location:  --    Preferred lab:  --    Send INR reminders to:   [facility]    Comments:  New patient transitioning to warfarin due to high cost of Eliquis.             Anticoagulation Care Providers       Provider Role Specialty Phone number    Troy Verde MD Referring Cardiology [phone]            Clinic Interview:  Patient Findings     Negatives:  Signs/symptoms of thrombosis, Signs/symptoms of bleeding,   Laboratory test error suspected, Change in health, Change in alcohol use,   Change in activity, Upcoming invasive procedure, Emergency department   visit, Upcoming dental procedure, Missed doses, Extra doses, Change in   medications, Change in diet/appetite, Hospital admission, Bruising, Other   complaints      Clinical Outcomes     Negatives:  Major bleeding event, Thromboembolic event,   Anticoagulation-related hospital admission, Anticoagulation-related ED   visit, Anticoagulation-related fatality        INR History:      2024     8:45 AM 2024     9:00 AM 2024     8:30 AM 2024     8:45 AM 2025     9:15 AM 2025     9:00 AM 2025     8:30 AM   Anticoagulation Monitoring   INR 4.5 2.4 1.7 2.0 2.4 1.5 2.8   INR Date 2024   INR Goal 2.0-3.0 2.0-3.0 2.0-3.0 2.0-3.0 2.0-3.0 2.0-3.0 2.0-3.0   Trend Same Same Same Same Same Up Same   Last Week  Total 22.5 mg 17.5 mg 22.5 mg 22.5 mg 22.5 mg 22.5 mg 25 mg   Next Week Total 17.5 mg 22.5 mg 25 mg 22.5 mg 22.5 mg 25 mg 25 mg   Sun 2.5 mg 2.5 mg 2.5 mg 2.5 mg 2.5 mg 2.5 mg 2.5 mg   Mon - 2.5 mg 5 mg (12/9); Otherwise 2.5 mg 2.5 mg 2.5 mg 2.5 mg 2.5 mg   Tue Hold (11/19) 5 mg 5 mg 5 mg 5 mg 5 mg 5 mg   Wed 2.5 mg 2.5 mg 2.5 mg 2.5 mg 2.5 mg 2.5 mg 2.5 mg   Thu 2.5 mg 2.5 mg 2.5 mg 2.5 mg 2.5 mg 5 mg 5 mg   Fri 2.5 mg 2.5 mg 2.5 mg 2.5 mg 2.5 mg 2.5 mg 2.5 mg   Sat 5 mg 5 mg 5 mg 5 mg 5 mg 5 mg 5 mg       Plan:  1. INR is Therapeutic today- see above in Anticoagulation Summary.  Will instruct Dionne Kaiser to Continue their warfarin regimen- see above in Anticoagulation Summary.  2. Follow up in 2 weeks  3. Patient declines warfarin refills.  4. Verbal and written information provided. Patient expresses understanding and has no further questions at this time.    Aleksandr Kidd, PharmD

## 2025-02-20 ENCOUNTER — ANTICOAGULATION VISIT (OUTPATIENT)
Dept: PHARMACY | Facility: HOSPITAL | Age: OVER 89
End: 2025-02-20
Payer: MEDICARE

## 2025-02-20 DIAGNOSIS — I48.21 PERMANENT ATRIAL FIBRILLATION: Primary | ICD-10-CM

## 2025-02-20 LAB
INR PPP: 4 (ref 0.91–1.09)
PROTHROMBIN TIME: 48.3 SECONDS (ref 10–13.8)

## 2025-02-20 PROCEDURE — G0463 HOSPITAL OUTPT CLINIC VISIT: HCPCS

## 2025-02-20 PROCEDURE — 85610 PROTHROMBIN TIME: CPT

## 2025-02-20 NOTE — PROGRESS NOTES
I have supervised and reviewed the notes, assessments, and/or procedures performed. The documented assessment and plan were developed cooperatively, and the plan was implemented in my presence. I concur with the documentation of this patient encounter.    Jerilyn Szymanski, [facility], located within [facility]

## 2025-02-20 NOTE — PROGRESS NOTES
Anticoagulation Clinic Progress Note    Anticoagulation Summary  As of 2025      INR goal:  2.0-3.0   TTR:  43.5% (6.3 mo)   INR used for dosin.0 (2025)   Warfarin maintenance plan:  5 mg every Tue, Thu, Sat; 2.5 mg all other days   Weekly warfarin total:  25 mg   Plan last modified:  Triny Turner, PharmD (2025)   Next INR check:  3/6/2025   Target end date:  --    Indications    Permanent atrial fibrillation [I48.21]                 Anticoagulation Episode Summary       INR check location:  --    Preferred lab:  --    Send INR reminders to:   [facility] [place]    Comments:  New patient transitioning to warfarin due to high cost of Eliquis.             Anticoagulation Care Providers       Provider Role Specialty Phone number    Troy Verde MD Referring Cardiology [phone]            Clinic Interview:  Patient Findings     Positives:  Change in diet/appetite    Negatives:  Signs/symptoms of thrombosis, Signs/symptoms of bleeding,   Laboratory test error suspected, Change in health, Change in alcohol use,   Change in activity, Upcoming invasive procedure, Emergency department   visit, Upcoming dental procedure, Missed doses, Extra doses, Change in   medications, Hospital admission, Bruising, Other complaints    Comments:  25: INR=4.0. Pt states she has been having a lot of soup   over the past week vs her normal diet which consists of a meat and a   vegetable (normally broccoli). Discussed with pt the importance of   consistency in diet and provided ideas on how to incorporate dark leafy   greens into soup meals.       Clinical Outcomes     Negatives:  Major bleeding event, Thromboembolic event,   Anticoagulation-related hospital admission, Anticoagulation-related ED   visit, Anticoagulation-related fatality    Comments:  25: INR=4.0. Pt states she has been having a lot of soup   over the past week vs her normal diet which consists of a meat and a   vegetable  (normally broccoli). Discussed with pt the importance of   consistency in diet and provided ideas on how to incorporate dark leafy   greens into soup meals.         INR History:      11/25/2024     9:00 AM 12/9/2024     8:30 AM 12/23/2024     8:45 AM 1/9/2025     9:15 AM 1/23/2025     9:00 AM 2/6/2025     8:30 AM 2/20/2025     8:30 AM   Anticoagulation Monitoring   INR 2.4 1.7 2.0 2.4 1.5 2.8 4.0   INR Date 11/25/2024 12/9/2024 12/23/2024 1/9/2025 1/23/2025 2/6/2025 2/20/2025   INR Goal 2.0-3.0 2.0-3.0 2.0-3.0 2.0-3.0 2.0-3.0 2.0-3.0 2.0-3.0   Trend Same Same Same Same Up Same Same   Last Week Total 17.5 mg 22.5 mg 22.5 mg 22.5 mg 22.5 mg 25 mg 25 mg   Next Week Total 22.5 mg 25 mg 22.5 mg 22.5 mg 25 mg 25 mg 20 mg   Sun 2.5 mg 2.5 mg 2.5 mg 2.5 mg 2.5 mg 2.5 mg 2.5 mg   Mon 2.5 mg 5 mg (12/9); Otherwise 2.5 mg 2.5 mg 2.5 mg 2.5 mg 2.5 mg 2.5 mg   Tue 5 mg 5 mg 5 mg 5 mg 5 mg 5 mg 5 mg   Wed 2.5 mg 2.5 mg 2.5 mg 2.5 mg 2.5 mg 2.5 mg 2.5 mg   Thu 2.5 mg 2.5 mg 2.5 mg 2.5 mg 5 mg 5 mg Hold (2/20); Otherwise 5 mg   Fri 2.5 mg 2.5 mg 2.5 mg 2.5 mg 2.5 mg 2.5 mg 2.5 mg   Sat 5 mg 5 mg 5 mg 5 mg 5 mg 5 mg 5 mg       Plan:  1. INR is Supratherapeutic today- see above in Anticoagulation Summary.  Will instruct Dionne Kaiser to HOLD 5 mg dose today (Th 2/20/25) & continue their warfarin regimen starting tomorrow (Fr 2/21/25) of 5 mg TuThSa, 2.5 mg AOD  2. Follow up in 2 weeks  3. Patient declines warfarin refills.  4. Verbal and written information provided. Patient expresses understanding and has no further questions at this time.    Kelli CorreaScott, Pharmacy Intern

## 2025-03-05 ENCOUNTER — TELEPHONE (OUTPATIENT)
Dept: CARDIOLOGY | Facility: CLINIC | Age: OVER 89
End: 2025-03-05
Payer: MEDICARE

## 2025-03-05 NOTE — TELEPHONE ENCOUNTER
Reviewed recommendations with patient, verbalized understanding, will call with any further questions or complaints.  Pt scheduled for HR/BP check tomorrow morning per her request.    Kristina Aguilar RN  Triage Nurse  03/05/25 12:02 EST

## 2025-03-05 NOTE — TELEPHONE ENCOUNTER
I spoke with pt.  She states that she has been taking metoprolol and is tolerating it well.  She states that her BP monitor is not working, and that she doesn't have the money to replace it right now (reads error when she tries to use it).    Kristina Aguilar, RN  Triage RN  03/05/25 11:42 EST

## 2025-03-05 NOTE — TELEPHONE ENCOUNTER
I recently saw her in the office and her heart rate was elevated.  I restarted metoprolol 25 mg 1 tablet daily.  Please call to see how she is doing on the metoprolol and reassess home blood pressure and heart rates.  Thank you so much.

## 2025-03-05 NOTE — TELEPHONE ENCOUNTER
Since she is 89, I just want to keep her safe with blood pressure and heart rates, please arrange a complementary blood pressure/heart rate check in the office in the near future.  Thank you

## 2025-03-06 ENCOUNTER — ANTICOAGULATION VISIT (OUTPATIENT)
Dept: PHARMACY | Facility: HOSPITAL | Age: OVER 89
End: 2025-03-06
Payer: MEDICARE

## 2025-03-06 ENCOUNTER — CLINICAL SUPPORT (OUTPATIENT)
Dept: CARDIOLOGY | Facility: CLINIC | Age: OVER 89
End: 2025-03-06
Payer: MEDICARE

## 2025-03-06 VITALS — OXYGEN SATURATION: 96 % | WEIGHT: 152 LBS | HEIGHT: 64 IN | BODY MASS INDEX: 25.95 KG/M2

## 2025-03-06 DIAGNOSIS — I48.21 PERMANENT ATRIAL FIBRILLATION: Primary | ICD-10-CM

## 2025-03-06 LAB
INR PPP: 3.3 (ref 0.91–1.09)
PROTHROMBIN TIME: 39 SECONDS (ref 10–13.8)

## 2025-03-06 PROCEDURE — G0463 HOSPITAL OUTPT CLINIC VISIT: HCPCS

## 2025-03-06 PROCEDURE — 85610 PROTHROMBIN TIME: CPT

## 2025-03-06 PROCEDURE — 99211 OFF/OP EST MAY X REQ PHY/QHP: CPT | Performed by: NURSE PRACTITIONER

## 2025-03-06 NOTE — PROGRESS NOTES
Patient is in the office today for an Blood pressure and heart rate check only.      Patient stated that she is feeling well today no complaints her blood pressure cuff that she brought with her in the office doesn't work and she doesn't have the money to afford right now to replace it at the current moment. She is doing just fine with the metoprolol 25 mg tablet once per day she just took it last night.     I checked her blood pressure and heart rate today in the office while the patient was in the exam room with our manual blood pressure cuff and it was performed on her left arm and it read 110/66 with an O2 of 96% and her pulse rate was 81.     Spoke with Lety MADERA and she was pleased with the readings and stated that the patient continue the same method with her medication as prescribed with the Metoprolol 25 mg tablet once per day and just keep her follow up appointment and if any questions or concerns arise to follow up with our office and the patient was advised and voiced an understanding  and was taken up front to be checked out.

## 2025-03-06 NOTE — PROGRESS NOTES
Anticoagulation Clinic Progress Note    Anticoagulation Summary  As of 3/6/2025      INR goal:  2.0-3.0   TTR:  40.5% (6.8 mo)   INR used for dosing:  3.3 (3/6/2025)   Warfarin maintenance plan:  5 mg every Tue, Sat; 2.5 mg all other days   Weekly warfarin total:  22.5 mg   Plan last modified:  Jerilyn Szymanski RPH (3/6/2025)   Next INR check:  3/20/2025   Target end date:  --    Indications    Permanent atrial fibrillation [I48.21]                 Anticoagulation Episode Summary       INR check location:  --    Preferred lab:  --    Send INR reminders to:   [facility] DECK[facility]    Comments:  New patient transitioning to warfarin due to high cost of Eliquis.             Anticoagulation Care Providers       Provider Role Specialty Phone number    Troy Verde MD Referring Cardiology [phone]            Clinic Interview:  Patient Findings     Positives:  Change in diet/appetite    Negatives:  Signs/symptoms of thrombosis, Signs/symptoms of bleeding,   Laboratory test error suspected, Change in health, Change in alcohol use,   Change in activity, Upcoming invasive procedure, Emergency department   visit, Upcoming dental procedure, Missed doses, Extra doses, Change in   medications, Hospital admission, Bruising, Other complaints    Comments:  INR=3.3. Pt reports having more leafy greens than normal this   week. No other changes. Instructed pt to change dosing regimen to 5 mg   TuSa, and 2.5 mg AODs. Recheck in 2 weeks.      Clinical Outcomes     Negatives:  Major bleeding event, Thromboembolic event,   Anticoagulation-related hospital admission, Anticoagulation-related ED   visit, Anticoagulation-related fatality    Comments:  INR=3.3. Pt reports having more leafy greens than normal this   week. No other changes. Instructed pt to change dosing regimen to 5 mg   TuSa, and 2.5 mg AODs. Recheck in 2 weeks.        INR History:      12/9/2024     8:30 AM 12/23/2024     8:45 AM 1/9/2025     9:15 AM 1/23/2025      9:00 AM 2/6/2025     8:30 AM 2/20/2025     8:30 AM 3/6/2025     8:45 AM   Anticoagulation Monitoring   INR 1.7 2.0 2.4 1.5 2.8 4.0 3.3   INR Date 12/9/2024 12/23/2024 1/9/2025 1/23/2025 2/6/2025 2/20/2025 3/6/2025   INR Goal 2.0-3.0 2.0-3.0 2.0-3.0 2.0-3.0 2.0-3.0 2.0-3.0 2.0-3.0   Trend Same Same Same Up Same Same Down   Last Week Total 22.5 mg 22.5 mg 22.5 mg 22.5 mg 25 mg 25 mg 25 mg   Next Week Total 25 mg 22.5 mg 22.5 mg 25 mg 25 mg 20 mg 22.5 mg   Sun 2.5 mg 2.5 mg 2.5 mg 2.5 mg 2.5 mg 2.5 mg 2.5 mg   Mon 5 mg (12/9); Otherwise 2.5 mg 2.5 mg 2.5 mg 2.5 mg 2.5 mg 2.5 mg 2.5 mg   Tue 5 mg 5 mg 5 mg 5 mg 5 mg 5 mg 5 mg   Wed 2.5 mg 2.5 mg 2.5 mg 2.5 mg 2.5 mg 2.5 mg 2.5 mg   Thu 2.5 mg 2.5 mg 2.5 mg 5 mg 5 mg Hold (2/20); Otherwise 5 mg 2.5 mg   Fri 2.5 mg 2.5 mg 2.5 mg 2.5 mg 2.5 mg 2.5 mg 2.5 mg   Sat 5 mg 5 mg 5 mg 5 mg 5 mg 5 mg 5 mg       Plan:  1. INR is Supratherapeutic today- see above in Anticoagulation Summary.  Will instruct Dionne Kaiser to Change their warfarin regimen (5 mg TuSa, 2.5 mg AODs) - see above in Anticoagulation Summary.  2. Follow up in 2 weeks  3. Patient declines warfarin refills.  4. Verbal and written information provided. Patient expresses understanding and has no further questions at this time.    Kelli CorreaDenmark, Pharmacy Intern

## 2025-03-20 ENCOUNTER — ANTICOAGULATION VISIT (OUTPATIENT)
Dept: PHARMACY | Facility: HOSPITAL | Age: OVER 89
End: 2025-03-20
Payer: MEDICARE

## 2025-03-20 DIAGNOSIS — I48.21 PERMANENT ATRIAL FIBRILLATION: Primary | ICD-10-CM

## 2025-03-20 LAB
INR PPP: 1 (ref 0.91–1.09)
PROTHROMBIN TIME: 12.3 SECONDS (ref 10–13.8)

## 2025-03-20 PROCEDURE — 85610 PROTHROMBIN TIME: CPT

## 2025-03-20 PROCEDURE — G0463 HOSPITAL OUTPT CLINIC VISIT: HCPCS

## 2025-03-20 NOTE — PROGRESS NOTES
I have supervised and reviewed the notes, assessments, and/or procedures performed. The documented assessment and plan were developed cooperatively, and the plan was implemented in my presence. I concur with the documentation of this patient encounter.    Jerilyn Szymanski, [facility]

## 2025-03-20 NOTE — PROGRESS NOTES
Anticoagulation Clinic Progress Note    Anticoagulation Summary  As of 3/20/2025      INR goal:  2.0-3.0   TTR:  40.7% (7.2 mo)   INR used for dosin.0 (3/20/2025)   Warfarin maintenance plan:  5 mg every Tue, Sat; 2.5 mg all other days   Weekly warfarin total:  22.5 mg   Plan last modified:  Jerilyn Szymanski RPH (3/6/2025)   Next INR check:  3/27/2025   Target end date:  --    Indications    Permanent atrial fibrillation [I48.21]                 Anticoagulation Episode Summary       INR check location:  --    Preferred lab:  --    Send INR reminders to:   [facility]    Comments:  New patient transitioning to warfarin due to high cost of Eliquis.             Anticoagulation Care Providers       Provider Role Specialty Phone number    Troy Verde MD Referring Cardiology [phone]            Clinic Interview:  Patient Findings     Positives:  Change in medications    Negatives:  Signs/symptoms of thrombosis, Signs/symptoms of bleeding,   Laboratory test error suspected, Change in health, Change in alcohol use,   Change in activity, Upcoming invasive procedure, Emergency department   visit, Upcoming dental procedure, Missed doses, Extra doses, Change in   diet/appetite, Hospital admission, Bruising, Other complaints    Comments:  Pt has been taking her telmisartan PRN vs prescribed   maintenance due to low/soft BP readings but no interactions between   telmisartan & warfarin.       Clinical Outcomes     Negatives:  Major bleeding event, Thromboembolic event,   Anticoagulation-related hospital admission, Anticoagulation-related ED   visit, Anticoagulation-related fatality    Comments:  Pt has been taking her telmisartan PRN vs prescribed   maintenance due to low/soft BP readings but no interactions between   telmisartan & warfarin.         INR History:      2024     8:45 AM 2025     9:15 AM 2025     9:00 AM 2025     8:30 AM 2025     8:30 AM 3/6/2025     8:45 AM  3/20/2025     8:45 AM   Anticoagulation Monitoring   INR 2.0 2.4 1.5 2.8 4.0 3.3 1.0   INR Date 12/23/2024 1/9/2025 1/23/2025 2/6/2025 2/20/2025 3/6/2025 3/20/2025   INR Goal 2.0-3.0 2.0-3.0 2.0-3.0 2.0-3.0 2.0-3.0 2.0-3.0 2.0-3.0   Trend Same Same Up Same Same Down Same   Last Week Total 22.5 mg 22.5 mg 22.5 mg 25 mg 25 mg 25 mg 22.5 mg   Next Week Total 22.5 mg 22.5 mg 25 mg 25 mg 20 mg 22.5 mg 26.25 mg   Sun 2.5 mg 2.5 mg 2.5 mg 2.5 mg 2.5 mg 2.5 mg 2.5 mg   Mon 2.5 mg 2.5 mg 2.5 mg 2.5 mg 2.5 mg 2.5 mg 2.5 mg   Tue 5 mg 5 mg 5 mg 5 mg 5 mg 5 mg 5 mg   Wed 2.5 mg 2.5 mg 2.5 mg 2.5 mg 2.5 mg 2.5 mg 2.5 mg   Thu 2.5 mg 2.5 mg 5 mg 5 mg Hold (2/20); Otherwise 5 mg 2.5 mg 5 mg (3/20)   Fri 2.5 mg 2.5 mg 2.5 mg 2.5 mg 2.5 mg 2.5 mg 3.75 mg (3/21)   Sat 5 mg 5 mg 5 mg 5 mg 5 mg 5 mg 5 mg       Plan:  1. INR is Subtherapeutic today- see above in Anticoagulation Summary.  Will instruct Dionne Kaiser to take 5 mg (2 tabs) booster dose today, 3.7 mg (1.5 tabs) booster dose tomorrow, then Continue their warfarin regimen (5 mg TuSa, 2.5 mg AOD) - see above in Anticoagulation Summary.  2. Follow up in 1 week  3. Patient declines warfarin refills.  4. Verbal and written information provided. Patient expresses understanding and has no further questions at this time.    Kelli CorreaChloe, Pharmacy Intern

## 2025-03-27 ENCOUNTER — ANTICOAGULATION VISIT (OUTPATIENT)
Dept: PHARMACY | Facility: HOSPITAL | Age: OVER 89
End: 2025-03-27
Payer: MEDICARE

## 2025-03-27 DIAGNOSIS — I48.21 PERMANENT ATRIAL FIBRILLATION: Primary | ICD-10-CM

## 2025-03-27 LAB
INR PPP: 1.6 (ref 0.91–1.09)
PROTHROMBIN TIME: 19.8 SECONDS (ref 10–13.8)

## 2025-03-27 PROCEDURE — G0463 HOSPITAL OUTPT CLINIC VISIT: HCPCS

## 2025-03-27 PROCEDURE — 85610 PROTHROMBIN TIME: CPT

## 2025-03-27 NOTE — PROGRESS NOTES
I have supervised and reviewed the notes, assessments, and/or procedures performed. The documented assessment and plan were developed cooperatively, and the plan was implemented in my presence. I concur with the documentation of this patient encounter.    Aleksandr Kidd, PharmD

## 2025-03-27 NOTE — PROGRESS NOTES
Anticoagulation Clinic Progress Note    Anticoagulation Summary  As of 3/27/2025      INR goal:  2.0-3.0   TTR:  39.4% (7.5 mo)   INR used for dosin.6 (3/27/2025)   Warfarin maintenance plan:  2.5 mg every Tue, Sat; 3.75 mg all other days   Weekly warfarin total:  23.75 mg   Plan last modified:  Aleksandr Kidd, PharmD (3/27/2025)   Next INR check:  4/3/2025   Target end date:  --    Indications    Permanent atrial fibrillation [I48.21]                 Anticoagulation Episode Summary       INR check location:  --    Preferred lab:  --    Send INR reminders to:   [facility] [place]    Comments:  New patient transitioning to warfarin due to high cost of Eliquis.             Anticoagulation Care Providers       Provider Role Specialty Phone number    Troy Verde MD Referring Cardiology [phone]            Clinic Interview:  Patient Findings     Positives:  Change in diet/appetite    Negatives:  Signs/symptoms of thrombosis, Signs/symptoms of bleeding,   Laboratory test error suspected, Change in health, Change in alcohol use,   Change in activity, Upcoming invasive procedure, Emergency department   visit, Upcoming dental procedure, Missed doses, Extra doses, Change in   medications, Hospital admission, Bruising, Other complaints    Comments:  Pt reports eating more spinach, cabbage, and other leafy   greens. Pt was provided educational handout on warfarin + diet.      Clinical Outcomes     Negatives:  Major bleeding event, Thromboembolic event,   Anticoagulation-related hospital admission, Anticoagulation-related ED   visit, Anticoagulation-related fatality    Comments:  Pt reports eating more spinach, cabbage, and other leafy   greens. Pt was provided educational handout on warfarin + diet.        INR History:      2025     9:15 AM 2025     9:00 AM 2025     8:30 AM 2025     8:30 AM 3/6/2025     8:45 AM 3/20/2025     8:45 AM 3/27/2025     8:45 AM   Anticoagulation Monitoring   INR  2.4 1.5 2.8 4.0 3.3 1.0 1.6   INR Date 1/9/2025 1/23/2025 2/6/2025 2/20/2025 3/6/2025 3/20/2025 3/27/2025   INR Goal 2.0-3.0 2.0-3.0 2.0-3.0 2.0-3.0 2.0-3.0 2.0-3.0 2.0-3.0   Trend Same Up Same Same Down Same Up   Last Week Total 22.5 mg 22.5 mg 25 mg 25 mg 25 mg 22.5 mg 26.25 mg   Next Week Total 22.5 mg 25 mg 25 mg 20 mg 22.5 mg 26.25 mg 23.75 mg   Sun 2.5 mg 2.5 mg 2.5 mg 2.5 mg 2.5 mg 2.5 mg 3.75 mg   Mon 2.5 mg 2.5 mg 2.5 mg 2.5 mg 2.5 mg 2.5 mg 3.75 mg   Tue 5 mg 5 mg 5 mg 5 mg 5 mg 5 mg 2.5 mg   Wed 2.5 mg 2.5 mg 2.5 mg 2.5 mg 2.5 mg 2.5 mg 3.75 mg   Thu 2.5 mg 5 mg 5 mg Hold (2/20); Otherwise 5 mg 2.5 mg 5 mg (3/20) 3.75 mg   Fri 2.5 mg 2.5 mg 2.5 mg 2.5 mg 2.5 mg 3.75 mg (3/21) 3.75 mg   Sat 5 mg 5 mg 5 mg 5 mg 5 mg 5 mg 2.5 mg       Plan:  1. INR is Subtherapeutic today- see above in Anticoagulation Summary.  Will instruct Dionne DOSS Job to Change their warfarin regimen to 2.5 mg TuSa, 3.75 mg AOD - see above in Anticoagulation Summary.  2. Follow up in 1 week  3. Patient declines warfarin refills.  4. Verbal and written information provided. Patient expresses understanding and has no further questions at this time.    Kelli CorreaLa Puente, Pharmacy Intern

## 2025-04-03 ENCOUNTER — ANTICOAGULATION VISIT (OUTPATIENT)
Dept: PHARMACY | Facility: HOSPITAL | Age: OVER 89
End: 2025-04-03
Payer: MEDICARE

## 2025-04-03 DIAGNOSIS — I48.21 PERMANENT ATRIAL FIBRILLATION: Primary | ICD-10-CM

## 2025-04-03 LAB
INR PPP: 2.1 (ref 0.91–1.09)
PROTHROMBIN TIME: 25.7 SECONDS (ref 10–13.8)

## 2025-04-03 PROCEDURE — 85610 PROTHROMBIN TIME: CPT

## 2025-04-03 PROCEDURE — G0463 HOSPITAL OUTPT CLINIC VISIT: HCPCS

## 2025-04-03 NOTE — PROGRESS NOTES
Anticoagulation Clinic Progress Note    Anticoagulation Summary  As of 4/3/2025      INR goal:  2.0-3.0   TTR:  38.8% (7.7 mo)   INR used for dosin.1 (4/3/2025)   Warfarin maintenance plan:  2.5 mg every e, Sat; 3.75 mg all other days   Weekly warfarin total:  23.75 mg   No change documented:  Aleksandr Kidd, [place]   Plan last modified:  Aleksandr Kidd PharmD (3/27/2025)   Next INR check:  2025   Target end date:  --    Indications    Permanent atrial fibrillation [I48.21]                 Anticoagulation Episode Summary       INR check location:  --    Preferred lab:  --    [facility]    Comments:  New patient transitioning to warfarin due to high cost of Eliquis.             Anticoagulation Care Providers       Provider Role Specialty Phone number    Troy Verde MD Referring Cardiology [phone]            Clinic Interview:  Patient Findings     Negatives:  Signs/symptoms of thrombosis, Signs/symptoms of bleeding,   Laboratory test error suspected, Change in health, Change in alcohol use,   Change in activity, Upcoming invasive procedure, Emergency department   visit, Upcoming dental procedure, Missed doses, Extra doses, Change in   medications, Change in diet/appetite, Hospital admission, Bruising, Other   complaints      Clinical Outcomes     Negatives:  Major bleeding event, Thromboembolic event,   Anticoagulation-related hospital admission, Anticoagulation-related ED   visit, Anticoagulation-related fatality        INR History:      2025     9:00 AM 2025     8:30 AM 2025     8:30 AM 3/6/2025     8:45 AM 3/20/2025     8:45 AM 3/27/2025     8:45 AM 4/3/2025     8:45 AM   Anticoagulation Monitoring   INR 1.5 2.8 4.0 3.3 1.0 1.6 2.1   INR Date 2025 2025 2025 3/6/2025 3/20/2025 3/27/2025 4/3/2025   INR Goal 2.0-3.0 2.0-3.0 2.0-3.0 2.0-3.0 2.0-3.0 2.0-3.0 2.0-3.0   Trend Up Same Same Down Same Up Same   Last Week Total 22.5 mg 25  mg 25 mg 25 mg 22.5 mg 26.25 mg 23.75 mg   Next Week Total 25 mg 25 mg 20 mg 22.5 mg 26.25 mg 23.75 mg 23.75 mg   Sun 2.5 mg 2.5 mg 2.5 mg 2.5 mg 2.5 mg 3.75 mg 3.75 mg   Mon 2.5 mg 2.5 mg 2.5 mg 2.5 mg 2.5 mg 3.75 mg 3.75 mg   Tue 5 mg 5 mg 5 mg 5 mg 5 mg 2.5 mg 2.5 mg   Wed 2.5 mg 2.5 mg 2.5 mg 2.5 mg 2.5 mg 3.75 mg 3.75 mg   Thu 5 mg 5 mg Hold (2/20); Otherwise 5 mg 2.5 mg 5 mg (3/20) 3.75 mg 3.75 mg   Fri 2.5 mg 2.5 mg 2.5 mg 2.5 mg 3.75 mg (3/21) 3.75 mg 3.75 mg   Sat 5 mg 5 mg 5 mg 5 mg 5 mg 2.5 mg 2.5 mg       Plan:  1. INR is Therapeutic today- see above in Anticoagulation Summary.  Will instruct Dionne SELAM Job to Continue their warfarin regimen- see above in Anticoagulation Summary.  2. Follow up in 2 weeks  3. Patient declines warfarin refills.  4. Verbal and written information provided. Patient expresses understanding and has no further questions at this time.    Aleksandr Kidd, PharmD

## 2025-04-17 ENCOUNTER — ANTICOAGULATION VISIT (OUTPATIENT)
Dept: PHARMACY | Facility: HOSPITAL | Age: OVER 89
End: 2025-04-17
Payer: MEDICARE

## 2025-04-17 DIAGNOSIS — I48.21 PERMANENT ATRIAL FIBRILLATION: Primary | ICD-10-CM

## 2025-04-17 LAB
INR PPP: 1.1 (ref 0.91–1.09)
PROTHROMBIN TIME: 13.7 SECONDS (ref 10–13.8)

## 2025-04-17 PROCEDURE — 85610 PROTHROMBIN TIME: CPT

## 2025-04-17 PROCEDURE — G0463 HOSPITAL OUTPT CLINIC VISIT: HCPCS

## 2025-04-17 NOTE — PROGRESS NOTES
Anticoagulation Clinic Progress Note    Anticoagulation Summary  As of 2025      INR goal:  2.0-3.0   TTR:  37.2% (8.2 mo)   INR used for dosin.1 (2025)   Warfarin maintenance plan:  2.5 mg every Tue; 3.75 mg all other days   Weekly warfarin total:  25 mg   Plan last modified:  Jerilyn Szymanski RPH (2025)   Next INR check:  2025   Target end date:  --    Indications    Permanent atrial fibrillation [I48.21]                 Anticoagulation Episode Summary       INR check location:  --    Preferred lab:  --    [facility]    Comments:  New patient transitioning to warfarin due to high cost of Eliquis.             Anticoagulation Care Providers       Provider Role Specialty Phone number    Troy Verde MD Referring Cardiology [phone]            Clinic Interview:  Patient Findings     Positives:  Change in diet/appetite    Negatives:  Signs/symptoms of thrombosis, Signs/symptoms of bleeding,   Laboratory test error suspected, Change in health, Change in alcohol use,   Change in activity, Upcoming invasive procedure, Emergency department   visit, Upcoming dental procedure, Missed doses, Extra doses, Change in   medications, Hospital admission, Bruising, Other complaints    Comments:  Pt reports eating tuna fish in oil for lunch past week and she   normally gets tuna in water      Clinical Outcomes     Negatives:  Major bleeding event, Thromboembolic event,   Anticoagulation-related hospital admission, Anticoagulation-related ED   visit, Anticoagulation-related fatality    Comments:  Pt reports eating tuna fish in oil for lunch past week and she   normally gets tuna in water        INR History:      2025     8:30 AM 3/6/2025     8:45 AM 3/20/2025     8:45 AM 3/27/2025     8:45 AM 4/3/2025     8:45 AM 2025     8:45 AM 2025     9:02 AM   Anticoagulation Monitoring   INR 4.0 3.3 1.0 1.6 2.1 1.1 --   INR Date 2025 3/6/2025 3/20/2025  3/27/2025 4/3/2025 4/17/2025    INR Goal 2.0-3.0 2.0-3.0 2.0-3.0 2.0-3.0 2.0-3.0 2.0-3.0 2.0-3.0   Trend Same Down Same Up Same Up    Last Week Total 25 mg 25 mg 22.5 mg 26.25 mg 23.75 mg 23.75 mg    Next Week Total 20 mg 22.5 mg 26.25 mg 23.75 mg 23.75 mg 26.25 mg    Sun 2.5 mg 2.5 mg 2.5 mg 3.75 mg 3.75 mg 3.75 mg    Mon 2.5 mg 2.5 mg 2.5 mg 3.75 mg 3.75 mg 3.75 mg    Tue 5 mg 5 mg 5 mg 2.5 mg 2.5 mg 2.5 mg    Wed 2.5 mg 2.5 mg 2.5 mg 3.75 mg 3.75 mg 3.75 mg    Thu Hold (2/20); Otherwise 5 mg 2.5 mg 5 mg (3/20) 3.75 mg 3.75 mg 5 mg (4/17); Otherwise 3.75 mg    Fri 2.5 mg 2.5 mg 3.75 mg (3/21) 3.75 mg 3.75 mg 3.75 mg    Sat 5 mg 5 mg 5 mg 2.5 mg 2.5 mg 3.75 mg        Plan:  1. INR is Subtherapeutic today- see above in Anticoagulation Summary.   Will instruct Dionne Kaiser to Increase their warfarin regimen- see above in Anticoagulation Summary.  2. Follow up in 1 week  3. They have been instructed to call if any changes in medications, doses, concerns, etc. Patient expresses understanding and has no further questions at this time.    Jesika Duggan, Pharmacy Intern

## 2025-04-17 NOTE — PROGRESS NOTES
I have supervised and reviewed the notes, assessments, and/or procedures performed. The documented assessment and plan were developed cooperatively, and the plan was implemented in my presence. I concur with the documentation of this patient encounter.    Jerilyn Szymanski, [facility]

## 2025-04-25 ENCOUNTER — ANTICOAGULATION VISIT (OUTPATIENT)
Dept: PHARMACY | Facility: HOSPITAL | Age: OVER 89
End: 2025-04-25
Payer: MEDICARE

## 2025-04-25 DIAGNOSIS — I48.21 PERMANENT ATRIAL FIBRILLATION: Primary | ICD-10-CM

## 2025-04-25 LAB
INR PPP: 1.6 (ref 0.91–1.09)
PROTHROMBIN TIME: 19.7 SECONDS (ref 10–13.8)

## 2025-04-25 PROCEDURE — 85610 PROTHROMBIN TIME: CPT

## 2025-04-25 PROCEDURE — G0463 HOSPITAL OUTPT CLINIC VISIT: HCPCS | Performed by: PHARMACIST

## 2025-04-25 NOTE — PROGRESS NOTES
Anticoagulation Clinic Progress Note    Anticoagulation Summary  As of 2025      INR goal:  2.0-3.0   TTR:  36.0% (8.4 mo)   INR used for dosin.6 (2025)   Warfarin maintenance plan:  3.75 mg every day   Weekly warfarin total:  26.25 mg   Plan last modified:  Jesika Duggan, Pharmacy Intern (2025)   Next INR check:  2025   Target end date:  --    Indications    Permanent atrial fibrillation [I48.21]                 Anticoagulation Episode Summary       INR check location:  --    Preferred lab:  --    Send INR reminders to:  [facility]    Comments:  New patient transitioning to warfarin due to high cost of Eliquis.             Anticoagulation Care Providers       Provider Role Specialty Phone number    Troy Verde MD Referring Cardiology [phone]            Clinic Interview:  Patient Findings     Negatives:  Signs/symptoms of thrombosis, Signs/symptoms of bleeding,   Laboratory test error suspected, Change in health, Change in alcohol use,   Change in activity, Upcoming invasive procedure, Emergency department   visit, Upcoming dental procedure, Missed doses, Extra doses, Change in   medications, Change in diet/appetite, Hospital admission, Bruising, Other   complaints      Clinical Outcomes     Negatives:  Major bleeding event, Thromboembolic event,   Anticoagulation-related hospital admission, Anticoagulation-related ED   visit, Anticoagulation-related fatality        INR History:      3/6/2025     8:45 AM 3/20/2025     8:45 AM 3/27/2025     8:45 AM 4/3/2025     8:45 AM 2025     8:45 AM 2025     9:02 AM 2025     8:45 AM   Anticoagulation Monitoring   INR 3.3 1.0 1.6 2.1 1.1 -- 1.6   INR Date 3/6/2025 3/20/2025 3/27/2025 4/3/2025 2025  2025   INR Goal 2.0-3.0 2.0-3.0 2.0-3.0 2.0-3.0 2.0-3.0 2.0-3.0 2.0-3.0   Trend Down Same Up Same Up  Up   Last Week Total 25 mg 22.5 mg 26.25 mg 23.75 mg 23.75 mg  25 mg   Next Week Total 22.5 mg 26.25 mg 23.75  mg 23.75 mg 26.25 mg  27.5 mg   Sun 2.5 mg 2.5 mg 3.75 mg 3.75 mg 3.75 mg  3.75 mg   Mon 2.5 mg 2.5 mg 3.75 mg 3.75 mg 3.75 mg  3.75 mg   Tue 5 mg 5 mg 2.5 mg 2.5 mg 2.5 mg  3.75 mg   Wed 2.5 mg 2.5 mg 3.75 mg 3.75 mg 3.75 mg  3.75 mg   Thu 2.5 mg 5 mg (3/20) 3.75 mg 3.75 mg 5 mg (4/17); Otherwise 3.75 mg  3.75 mg   Fri 2.5 mg 3.75 mg (3/21) 3.75 mg 3.75 mg 3.75 mg  5 mg (4/25); Otherwise 3.75 mg   Sat 5 mg 5 mg 2.5 mg 2.5 mg 3.75 mg  3.75 mg       Plan:  1. INR is Subtherapeutic today- see above in Anticoagulation Summary.   Will instruct Dionne Kaiser to Increase their warfarin regimen- see above in Anticoagulation Summary.  2. Follow up in 1.5 weeks  3. They have been instructed to call if any changes in medications, doses, concerns, etc. Patient expresses understanding and has no further questions at this time.    Jesika Duggan, Pharmacy Intern

## 2025-05-05 ENCOUNTER — ANTICOAGULATION VISIT (OUTPATIENT)
Dept: PHARMACY | Facility: HOSPITAL | Age: OVER 89
End: 2025-05-05
Payer: MEDICARE

## 2025-05-05 DIAGNOSIS — I48.21 PERMANENT ATRIAL FIBRILLATION: Primary | ICD-10-CM

## 2025-05-05 LAB
INR PPP: 2.3 (ref 0.91–1.09)
PROTHROMBIN TIME: 27.9 SECONDS (ref 10–13.8)

## 2025-05-05 PROCEDURE — 85610 PROTHROMBIN TIME: CPT

## 2025-05-05 PROCEDURE — G0463 HOSPITAL OUTPT CLINIC VISIT: HCPCS

## 2025-05-05 NOTE — PROGRESS NOTES
Anticoagulation Clinic Progress Note    Anticoagulation Summary  As of 2025      INR goal:  2.0-3.0   TTR:  36.3% (8.8 mo)   INR used for dosin.3 (2025)   Warfarin maintenance plan:  3.75 mg every day   Weekly warfarin total:  26.25 mg   No change documented:  Maryann Alberts   Plan last modified:  Jesika Duggan, Pharmacy Intern (2025)   Next INR check:  2025   Target end date:  --    Indications    Permanent atrial fibrillation [I48.21]                 Anticoagulation Episode Summary       INR check location:  --    Preferred lab:  --    Send INR reminders to:   [facility]    Comments:  New patient transitioning to warfarin due to high cost of Eliquis.             Anticoagulation Care Providers       Provider Role Specialty Phone number    Troy Verde MD Referring Cardiology [phone]            Clinic Interview:  Patient Findings     Positives:  Change in alcohol use    Negatives:  Signs/symptoms of thrombosis, Signs/symptoms of bleeding,   Laboratory test error suspected, Change in health, Change in activity,   Upcoming invasive procedure, Emergency department visit, Upcoming dental   procedure, Missed doses, Extra doses, Change in medications, Change in   diet/appetite, Hospital admission, Bruising, Other complaints    Comments:  Patient reported etoh over the weekend.      Clinical Outcomes     Negatives:  Major bleeding event, Thromboembolic event,   Anticoagulation-related hospital admission, Anticoagulation-related ED   visit, Anticoagulation-related fatality    Comments:  Patient reported etoh over the weekend.        INR History:      3/20/2025     8:45 AM 3/27/2025     8:45 AM 4/3/2025     8:45 AM 2025     8:45 AM 2025     9:02 AM 2025     8:45 AM 2025     8:30 AM   Anticoagulation Monitoring   INR 1.0 1.6 2.1 1.1 -- 1.6 2.3   INR Date 3/20/2025 3/27/2025 4/3/2025 2025  2025 2025   INR Goal 2.0-3.0 2.0-3.0 2.0-3.0  2.0-3.0 2.0-3.0 2.0-3.0 2.0-3.0   Trend Same Up Same Up  Up Same   Last Week Total 22.5 mg 26.25 mg 23.75 mg 23.75 mg  25 mg 26.25 mg   Next Week Total 26.25 mg 23.75 mg 23.75 mg 26.25 mg  27.5 mg 26.25 mg   Sun 2.5 mg 3.75 mg 3.75 mg 3.75 mg  3.75 mg 3.75 mg   Mon 2.5 mg 3.75 mg 3.75 mg 3.75 mg  3.75 mg 3.75 mg   Tue 5 mg 2.5 mg 2.5 mg 2.5 mg  3.75 mg 3.75 mg   Wed 2.5 mg 3.75 mg 3.75 mg 3.75 mg  3.75 mg 3.75 mg   Thu 5 mg (3/20) 3.75 mg 3.75 mg 5 mg (4/17); Otherwise 3.75 mg  3.75 mg 3.75 mg   Fri 3.75 mg (3/21) 3.75 mg 3.75 mg 3.75 mg  5 mg (4/25); Otherwise 3.75 mg 3.75 mg   Sat 5 mg 2.5 mg 2.5 mg 3.75 mg  3.75 mg 3.75 mg       Plan:  1. INR is therapeutic today- see above in Anticoagulation Summary.   Will instruct Dionne Kaiser to continue their warfarin regimen- see above in Anticoagulation Summary.  2. Follow up in 2 weeks.  3. Patient declines warfarin refills.  4. Verbal and written information provided. Patient expresses understanding and has no further questions at this time.    Maryann Alberts

## 2025-05-19 ENCOUNTER — ANTICOAGULATION VISIT (OUTPATIENT)
Dept: PHARMACY | Facility: HOSPITAL | Age: OVER 89
End: 2025-05-19
Payer: MEDICARE

## 2025-05-19 DIAGNOSIS — I48.21 PERMANENT ATRIAL FIBRILLATION: Primary | ICD-10-CM

## 2025-05-19 LAB
INR PPP: 2.2 (ref 0.91–1.09)
PROTHROMBIN TIME: 26.3 SECONDS (ref 10–13.8)

## 2025-05-19 PROCEDURE — 85610 PROTHROMBIN TIME: CPT

## 2025-05-19 PROCEDURE — G0463 HOSPITAL OUTPT CLINIC VISIT: HCPCS

## 2025-05-19 NOTE — PROGRESS NOTES
I have supervised and reviewed the notes, assessments, and/or procedures performed. The documented assessment and plan were developed cooperatively, and the plan was implemented in my presence. I concur with the documentation of this patient encounter.    Jerilyn Szymanski, [facility]

## 2025-05-19 NOTE — PROGRESS NOTES
Anticoagulation Clinic Progress Note    Anticoagulation Summary  As of 2025      INR goal:  2.0-3.0   TTR:  39.5% (9.2 mo)   INR used for dosin.2 (2025)   Warfarin maintenance plan:  3.75 mg every day   Weekly warfarin total:  26.25 mg   No change documented:  Kary Reyes   Plan last modified:  Jesika Duggan, Pharmacy Intern (2025)   Next INR check:  2025   Target end date:  --    Indications    Permanent atrial fibrillation [I48.21]                 Anticoagulation Episode Summary       INR check location:  --    Preferred lab:  --    Send INR reminders to:   [facility] [place]    Comments:  New patient transitioning to warfarin due to high cost of Eliquis.             Anticoagulation Care Providers       Provider Role Specialty Phone number    Troy Verde MD Referring Cardiology [phone]            Clinic Interview:  Patient Findings     Negatives:  Signs/symptoms of thrombosis, Signs/symptoms of bleeding,   Laboratory test error suspected, Change in health, Change in alcohol use,   Change in activity, Upcoming invasive procedure, Emergency department   visit, Upcoming dental procedure, Missed doses, Extra doses, Change in   medications, Change in diet/appetite, Hospital admission, Bruising, Other   complaints    Comments:  Pt reports no changes; pt has upcoming trip to [place] from   -      Clinical Outcomes     Negatives:  Major bleeding event, Thromboembolic event,   Anticoagulation-related hospital admission, Anticoagulation-related ED   visit, Anticoagulation-related fatality    Comments:  Pt reports no changes; pt has upcoming trip to [place] from   -        INR History:      3/27/2025     8:45 AM 4/3/2025     8:45 AM 2025     8:45 AM 2025     9:02 AM 2025     8:45 AM 2025     8:30 AM 2025     8:30 AM   Anticoagulation Monitoring   INR 1.6 2.1 1.1 -- 1.6 2.3 2.2   INR Date 3/27/2025 4/3/2025 2025  2025 2025  5/19/2025   INR Goal 2.0-3.0 2.0-3.0 2.0-3.0 2.0-3.0 2.0-3.0 2.0-3.0 2.0-3.0   Trend Up Same Up  Up Same Same   Last Week Total 26.25 mg 23.75 mg 23.75 mg  25 mg 26.25 mg 26.25 mg   Next Week Total 23.75 mg 23.75 mg 26.25 mg  27.5 mg 26.25 mg 26.25 mg   Sun 3.75 mg 3.75 mg 3.75 mg  3.75 mg 3.75 mg 3.75 mg   Mon 3.75 mg 3.75 mg 3.75 mg  3.75 mg 3.75 mg 3.75 mg   Tue 2.5 mg 2.5 mg 2.5 mg  3.75 mg 3.75 mg 3.75 mg   Wed 3.75 mg 3.75 mg 3.75 mg  3.75 mg 3.75 mg 3.75 mg   Thu 3.75 mg 3.75 mg 5 mg (4/17); Otherwise 3.75 mg  3.75 mg 3.75 mg 3.75 mg   Fri 3.75 mg 3.75 mg 3.75 mg  5 mg (4/25); Otherwise 3.75 mg 3.75 mg 3.75 mg   Sat 2.5 mg 2.5 mg 3.75 mg  3.75 mg 3.75 mg 3.75 mg       Plan:  1. INR is Therapeutic today- see above in Anticoagulation Summary.  Will instruct Dionne Kaiser to Continue their warfarin regimen- see above in Anticoagulation Summary.  2. Follow up on 6/25/25 following trip to [place]  3. Patient declines warfarin refills.  4. Verbal and written information provided. Patient expresses understanding and has no further questions at this time.    Kary Reyes

## 2025-06-25 ENCOUNTER — ANTICOAGULATION VISIT (OUTPATIENT)
Dept: PHARMACY | Facility: HOSPITAL | Age: OVER 89
End: 2025-06-25
Payer: MEDICARE

## 2025-06-25 DIAGNOSIS — I48.21 PERMANENT ATRIAL FIBRILLATION: Primary | ICD-10-CM

## 2025-06-25 LAB
INR PPP: 2.4 (ref 0.91–1.09)
PROTHROMBIN TIME: 29 SECONDS (ref 10–13.8)

## 2025-06-25 PROCEDURE — G0463 HOSPITAL OUTPT CLINIC VISIT: HCPCS

## 2025-06-25 PROCEDURE — 85610 PROTHROMBIN TIME: CPT

## 2025-06-25 NOTE — PROGRESS NOTES
Anticoagulation Clinic Progress Note    Anticoagulation Summary  As of 2025      INR goal:  2.0-3.0   TTR:  46.6% (10.5 mo)   INR used for dosin.4 (2025)   Warfarin maintenance plan:  3.75 mg every day   Weekly warfarin total:  26.25 mg   No change documented:  Aleksandr Kidd, PharmD   Plan last modified:  Jesika Duggan, Pharmacy Intern (2025)   Next INR check:  2025   Target end date:  --    Indications    Permanent atrial fibrillation [I48.21]                 Anticoagulation Episode Summary       INR check location:  --    Preferred lab:  --    Send INR reminders to:   [facility] [place]    Comments:  New patient transitioning to warfarin due to high cost of Eliquis.             Anticoagulation Care Providers       Provider Role Specialty Phone number    Troy Verde MD Referring Cardiology [phone]            Clinic Interview:  Patient Findings     Negatives:  Signs/symptoms of thrombosis, Signs/symptoms of bleeding,   Laboratory test error suspected, Change in health, Change in alcohol use,   Change in activity, Upcoming invasive procedure, Emergency department   visit, Upcoming dental procedure, Missed doses, Extra doses, Change in   medications, Change in diet/appetite, Hospital admission, Bruising, Other   complaints      Clinical Outcomes     Negatives:  Major bleeding event, Thromboembolic event,   Anticoagulation-related hospital admission, Anticoagulation-related ED   visit, Anticoagulation-related fatality        INR History:      4/3/2025     8:45 AM 2025     8:45 AM 2025     9:02 AM 2025     8:45 AM 2025     8:30 AM 2025     8:30 AM 2025     8:30 AM   Anticoagulation Monitoring   INR 2.1 1.1 -- 1.6 2.3 2.2 2.4   INR Date 4/3/2025 2025  2025 2025 2025 2025   INR Goal 2.0-3.0 2.0-3.0 2.0-3.0 2.0-3.0 2.0-3.0 2.0-3.0 2.0-3.0   Trend Same Up  Up Same Same Same   Last Week Total 23.75 mg 23.75 mg  25 mg 26.25 mg 26.25  mg 26.25 mg   Next Week Total 23.75 mg 26.25 mg  27.5 mg 26.25 mg 26.25 mg 26.25 mg   Sun 3.75 mg 3.75 mg  3.75 mg 3.75 mg 3.75 mg 3.75 mg   Mon 3.75 mg 3.75 mg  3.75 mg 3.75 mg 3.75 mg 3.75 mg   Tue 2.5 mg 2.5 mg  3.75 mg 3.75 mg 3.75 mg 3.75 mg   Wed 3.75 mg 3.75 mg  3.75 mg 3.75 mg 3.75 mg 3.75 mg   Thu 3.75 mg 5 mg (4/17); Otherwise 3.75 mg  3.75 mg 3.75 mg 3.75 mg 3.75 mg   Fri 3.75 mg 3.75 mg  5 mg (4/25); Otherwise 3.75 mg 3.75 mg 3.75 mg 3.75 mg   Sat 2.5 mg 3.75 mg  3.75 mg 3.75 mg 3.75 mg 3.75 mg       Plan:  1. INR is Therapeutic today- see above in Anticoagulation Summary.  Will instruct Dionne Kaiser to Continue their warfarin regimen- see above in Anticoagulation Summary.  2. Follow up in 4 weeks  3. Patient declines warfarin refills.  4. Verbal and written information provided. Patient expresses understanding and has no further questions at this time.    Aleksandr Kidd, PharmD

## 2025-07-09 ENCOUNTER — OFFICE VISIT (OUTPATIENT)
Dept: ONCOLOGY | Facility: CLINIC | Age: OVER 89
End: 2025-07-09
Payer: MEDICARE

## 2025-07-09 ENCOUNTER — LAB (OUTPATIENT)
Dept: LAB | Facility: HOSPITAL | Age: OVER 89
End: 2025-07-09
Payer: MEDICARE

## 2025-07-09 VITALS
BODY MASS INDEX: 25.4 KG/M2 | HEIGHT: 64 IN | OXYGEN SATURATION: 97 % | HEART RATE: 74 BPM | WEIGHT: 148.8 LBS | SYSTOLIC BLOOD PRESSURE: 129 MMHG | TEMPERATURE: 97.4 F | DIASTOLIC BLOOD PRESSURE: 85 MMHG

## 2025-07-09 DIAGNOSIS — C91.10 CLL (CHRONIC LYMPHOCYTIC LEUKEMIA): Primary | ICD-10-CM

## 2025-07-09 DIAGNOSIS — C91.10 CLL (CHRONIC LYMPHOCYTIC LEUKEMIA): ICD-10-CM

## 2025-07-09 LAB
BASOPHILS # BLD AUTO: 0.06 10*3/MM3 (ref 0–0.2)
BASOPHILS NFR BLD AUTO: 0.3 % (ref 0–1.5)
DEPRECATED RDW RBC AUTO: 45.1 FL (ref 37–54)
EOSINOPHIL # BLD AUTO: 0.14 10*3/MM3 (ref 0–0.4)
EOSINOPHIL NFR BLD AUTO: 0.7 % (ref 0.3–6.2)
ERYTHROCYTE [DISTWIDTH] IN BLOOD BY AUTOMATED COUNT: 12.2 % (ref 12.3–15.4)
HCT VFR BLD AUTO: 38.9 % (ref 34–46.6)
HGB BLD-MCNC: 12.4 G/DL (ref 12–15.9)
IMM GRANULOCYTES # BLD AUTO: 0.08 10*3/MM3 (ref 0–0.05)
IMM GRANULOCYTES NFR BLD AUTO: 0.4 % (ref 0–0.5)
LYMPHOCYTES # BLD AUTO: 13.06 10*3/MM3 (ref 0.7–3.1)
LYMPHOCYTES NFR BLD AUTO: 67.6 % (ref 19.6–45.3)
MCH RBC QN AUTO: 32.3 PG (ref 26.6–33)
MCHC RBC AUTO-ENTMCNC: 31.9 G/DL (ref 31.5–35.7)
MCV RBC AUTO: 101.3 FL (ref 79–97)
MONOCYTES # BLD AUTO: 1.51 10*3/MM3 (ref 0.1–0.9)
MONOCYTES NFR BLD AUTO: 7.8 % (ref 5–12)
NEUTROPHILS NFR BLD AUTO: 23.2 % (ref 42.7–76)
NEUTROPHILS NFR BLD AUTO: 4.47 10*3/MM3 (ref 1.7–7)
NRBC BLD AUTO-RTO: 0 /100 WBC (ref 0–0.2)
PLATELET # BLD AUTO: 186 10*3/MM3 (ref 140–450)
PMV BLD AUTO: 10.8 FL (ref 6–12)
RBC # BLD AUTO: 3.84 10*6/MM3 (ref 3.77–5.28)
WBC NRBC COR # BLD AUTO: 19.32 10*3/MM3 (ref 3.4–10.8)

## 2025-07-09 PROCEDURE — 85025 COMPLETE CBC W/AUTO DIFF WBC: CPT

## 2025-07-09 PROCEDURE — 36415 COLL VENOUS BLD VENIPUNCTURE: CPT

## 2025-07-09 NOTE — PROGRESS NOTES
Subjective .     REASON FOR FOLLOWUP:  CLL.                             HISTORY OF PRESENT ILLNESS:  The patient is a 89 y.o. year old female  who is here for follow-up with the above-mentioned history.    No new issues.  No fever chills weight loss night sweats.  No unexplained areas of pain    Past Medical History:   Diagnosis Date    Arthritis     Asthma     CKD (chronic kidney disease) stage 3, GFR 30-59 ml/min     CLL (chronic lymphocytic leukemia)     Stage 0, trisomy 12    GERD (gastroesophageal reflux disease)     H/O Frequent PACs and PVCs     H/O Lymphocytosis     H/O Transient amnesia     HTN (hypertension)     Hyperlipidemia     Hypothyroid     ILD (interstitial lung disease)     LAFB (left anterior fascicular block)     Lumbar stenosis     Neuropathy     Legs    Osteopenia     Permanent atrial fibrillation 08/05/2024    PVCs (premature ventricular contractions)     Seasonal allergies     Ulcerative colitis        HEMATOLOGIC/ONCOLOGIC HISTORY:  (History from previous dates can be found in the separate document.)    MEDICATIONS    Current Outpatient Medications:     Cholecalciferol (VITAMIN D-3 PO), Take 2,000 Units by mouth daily., Disp: , Rfl:     denosumab (Prolia) 60 MG/ML solution prefilled syringe syringe, Inject 1 mL under the skin into the appropriate area as directed Every 6 (Six) Months., Disp: , Rfl:     fluticasone (FLONASE) 50 MCG/ACT nasal spray, 1 spray Daily As Needed for Allergies., Disp: , Rfl:     folic acid (FOLVITE) 1 MG tablet, Take 1 tablet by mouth Daily., Disp: , Rfl:     guaiFENesin-codeine (ROMILAR-AC) 100-10 MG/5ML solution/syrup, TAKE 10 ML BY MOUTH 6 TIMES A DAY, Disp: , Rfl:     hydroCHLOROthiazide (MICROZIDE) 12.5 MG capsule, , Disp: , Rfl:     levothyroxine (SYNTHROID, LEVOTHROID) 75 MCG tablet, Take 1 tablet by mouth Daily., Disp: , Rfl:     lidocaine (LIDODERM) 5 %, Lidoderm 5 % topical patch  APPLY 1-2 PATCH BY TRANSDERMAL ROUTE ONCE DAILY (MAY WEAR UP TO 12HOURS.),  Disp: , Rfl:     loratadine (CLARITIN) 10 MG tablet, Take 1 tablet by mouth Daily., Disp: , Rfl:     metoprolol succinate XL (TOPROL-XL) 25 MG 24 hr tablet, Take 1 tablet by mouth Every Night., Disp: 90 tablet, Rfl: 3    omeprazole (PriLOSEC) 20 MG capsule, Take 1 capsule by mouth Daily., Disp: , Rfl:     rosuvastatin (CRESTOR) 20 MG tablet, Take 2 tablets by mouth Daily., Disp: , Rfl:     sulfaSALAzine (AZULFIDINE) 500 MG tablet, Take 1 tablet by mouth 2 (Two) Times a Day., Disp: , Rfl:     telmisartan (MICARDIS) 40 MG tablet, Take 1 tablet by mouth Daily., Disp: , Rfl:     warfarin (COUMADIN) 2.5 MG tablet, Take two tablets (5 mg) by mouth on Tues, and Sat, and take one tablet (2.5 mg) by mouth all other days or as directed., Disp: 130 tablet, Rfl: 1    ALLERGIES:     Allergies   Allergen Reactions    Penicillins Anaphylaxis       SOCIAL HISTORY:       Social History     Socioeconomic History    Marital status:     Number of children: 3   Tobacco Use    Smoking status: Former     Current packs/day: 0.00     Average packs/day: 1.5 packs/day for 40.0 years (60.0 ttl pk-yrs)     Types: Cigarettes     Start date:      Quit date:      Years since quittin.5     Passive exposure: Past    Smokeless tobacco: Never   Vaping Use    Vaping status: Never Used   Substance and Sexual Activity    Alcohol use: Yes     Alcohol/week: 7.0 standard drinks of alcohol     Types: 7 Glasses of wine per week     Comment: 1 drink daily    Drug use: Never    Sexual activity: Defer         FAMILY HISTORY:  Cancer-related family history includes Colon cancer in her mother.    REVIEW OF SYSTEMS:  Review of Systems   Constitutional:  Negative for activity change.   HENT:  Negative for nosebleeds and trouble swallowing.    Respiratory:  Negative for shortness of breath and wheezing.    Cardiovascular:  Negative for chest pain and palpitations.   Gastrointestinal:  Negative for constipation, diarrhea and nausea.  "  Genitourinary:  Negative for dysuria and hematuria.   Musculoskeletal:  Negative for arthralgias and myalgias.   Skin:  Negative for rash and wound.   Neurological:  Negative for seizures and syncope.   Hematological:  Negative for adenopathy. Does not bruise/bleed easily.   Psychiatric/Behavioral:  Negative for confusion.       Objective    Vitals:    07/09/25 1517   BP: 129/85   Pulse: 74   Temp: 97.4 °F (36.3 °C)   TempSrc: Oral   SpO2: 97%   Weight: 67.5 kg (148 lb 12.8 oz)   Height: 162.6 cm (64.02\")   PainSc: 0-No pain         7/9/2025     3:21 PM   Current Status   ECOG score 0      PHYSICAL EXAM:        CONSTITUTIONAL:  Vital signs reviewed.  No distress, looks comfortable.  EYES:  Conjunctiva and lids unremarkable.  PERRLA  EARS,NOSE,MOUTH,THROAT:  Ears and nose appear unremarkable.  Lips, teeth, gums appear unremarkable.  RESPIRATORY:  Normal respiratory effort.  Lungs clear to auscultation bilaterally.  CARDIOVASCULAR:  Normal S1, S2.  No murmurs rubs or gallops.  No significant lower extremity edema.  GASTROINTESTINAL: Abdomen appears unremarkable.  Nontender.  No hepatomegaly.  No splenomegaly.  LYMPHATIC:  No cervical, supraclavicular, axillary lymphadenopathy.  SKIN:  Warm.  No rashes.  PSYCHIATRIC:  Normal judgment and insight.  Normal mood and affect.        RECENT LABS:        WBC   Date/Time Value Ref Range Status   07/09/2025 03:08 PM 19.32 (H) 3.40 - 10.80 10*3/mm3 Final     Hemoglobin   Date/Time Value Ref Range Status   07/09/2025 03:08 PM 12.4 12.0 - 15.9 g/dL Final     Platelets   Date/Time Value Ref Range Status   07/09/2025 03:08  140 - 450 10*3/mm3 Final       Assessment/Plan     ASSESSMENT:  There are no diagnoses linked to this encounter.        *CLL, stage 0, trisomy 12 (prognostic significance not clear).  Has not required any treatment.  WBC 9 in 2012, 11 in 2013.   13--15 [id number].  Continues to have no lymphadenopathy or splenomegaly.  CLL remains stable.  Continues to be " stable.  Relatively stable.    *Leukocytosis, due to lymphocytosis.  WBC 19.3, from 17.3, from 20.6, from 16.5, from 16.5, 18.9, from 17.5, from 17.9, from 16.7, from 15.3, from 18.6, from 22.2    *Lymphocytosis.  Due to CLL.  Stable.  Monitor.  ALC 13,060, from 7200, from 8150, from 8530, from 9450, from 9720, from 10,060, from 9970, from 8490, from 9090    *Macrocytosis.  .3, from 99.3, from 100.5, from 100.5, from 98.3, from 96.2, from 96.5, from 96.9, from 95.2    *Non-drenching, intermittent, Night sweats.    This does not appear to be due to CLL since everything else looks stable/asymptomatic.   10/27/2021 night sweats are not drenching, and almost none for the past month  5/2/2022: Denies night sweats  11/21/2022: Denies night sweats  7/12/2023: Denies night sweats  1/8/2024: Denies night sweats  7/15/2024: Denies night sweats  1/27/2025: Denies night sweats  7/9/2025: Denies night sweats    *A-fib.  Follows with Dr. Troy Verde.  On Coumadin.    *Overweight.  Being overweight is a risk factor for malignancies.  Ideally, lose weight  Body mass index is 25.53 kg/m².  BMI 25 to <30 is overweight  BMI 30 to <35 is class 1 obesity  BMI 35 to <40 is class 2 obesity  BMI 40 or higher is class 3 obesity   Remaining slightly overweight.  Ideally, lose weight    PLAN:  MLACHO CBC 6 months  (I have previously offered annual follow-up but she prefers 6 months)    I reviewed last 3 INR's which were not ordered by us

## 2025-07-23 ENCOUNTER — ANTICOAGULATION VISIT (OUTPATIENT)
Dept: PHARMACY | Facility: HOSPITAL | Age: OVER 89
End: 2025-07-23
Payer: MEDICARE

## 2025-07-23 DIAGNOSIS — I48.21 PERMANENT ATRIAL FIBRILLATION: Primary | ICD-10-CM

## 2025-07-23 LAB
INR PPP: 4.4 (ref 0.91–1.09)
PROTHROMBIN TIME: 53.3 SECONDS (ref 10–13.8)

## 2025-07-23 PROCEDURE — G0463 HOSPITAL OUTPT CLINIC VISIT: HCPCS

## 2025-07-23 PROCEDURE — 85610 PROTHROMBIN TIME: CPT

## 2025-07-23 NOTE — PROGRESS NOTES
Anticoagulation Clinic Progress Note    Anticoagulation Summary  As of 2025      INR goal:  2.0-3.0   TTR:  45.2% (11.4 mo)   INR used for dosin.4 (2025)   Warfarin maintenance plan:  3.75 mg every day   Weekly warfarin total:  26.25 mg   Plan last modified:  Jesika Duggan, Pharmacy Intern (2025)   Next INR check:  2025   Target end date:  --    Indications    Permanent atrial fibrillation [I48.21]                 Anticoagulation Episode Summary       INR check location:  --    Preferred lab:  --    Send INR reminders to:   [facility] [place]    Comments:  New patient transitioning to warfarin due to high cost of Eliquis.             Anticoagulation Care Providers       Provider Role Specialty Phone number    Troy Verde MD Referring Cardiology [phone]            Clinic Interview:  Patient Findings     Positives:  Change in diet/appetite    Negatives:  Signs/symptoms of thrombosis, Signs/symptoms of bleeding,   Laboratory test error suspected, Change in health, Change in alcohol use,   Change in activity, Upcoming invasive procedure, Emergency department   visit, Upcoming dental procedure, Missed doses, Extra doses, Change in   medications, Hospital admission, Bruising, Other complaints    Comments:  Reports she ate some mangoes last week.       Clinical Outcomes     Negatives:  Major bleeding event, Thromboembolic event,   Anticoagulation-related hospital admission, Anticoagulation-related ED   visit, Anticoagulation-related fatality    Comments:  Reports she ate some mangoes last week.         INR History:      2025     8:45 AM 2025     9:02 AM 2025     8:45 AM 2025     8:30 AM 2025     8:30 AM 2025     8:30 AM 2025     8:30 AM   Anticoagulation Monitoring   INR 1.1 -- 1.6 2.3 2.2 2.4 4.4   INR Date 2025   INR Goal 2.0-3.0 2.0-3.0 2.0-3.0 2.0-3.0 2.0-3.0 2.0-3.0 2.0-3.0   Trend Up  Up  Same Same Same Same   Last Week Total 23.75 mg  25 mg 26.25 mg 26.25 mg 26.25 mg 26.25 mg   Next Week Total 26.25 mg  27.5 mg 26.25 mg 26.25 mg 26.25 mg 22.5 mg   Sun 3.75 mg  3.75 mg 3.75 mg 3.75 mg 3.75 mg 3.75 mg   Mon 3.75 mg  3.75 mg 3.75 mg 3.75 mg 3.75 mg 3.75 mg   Tue 2.5 mg  3.75 mg 3.75 mg 3.75 mg 3.75 mg 3.75 mg   Wed 3.75 mg  3.75 mg 3.75 mg 3.75 mg 3.75 mg Hold (7/23)   Thu 5 mg (4/17); Otherwise 3.75 mg  3.75 mg 3.75 mg 3.75 mg 3.75 mg 3.75 mg   Fri 3.75 mg  5 mg (4/25); Otherwise 3.75 mg 3.75 mg 3.75 mg 3.75 mg 3.75 mg   Sat 3.75 mg  3.75 mg 3.75 mg 3.75 mg 3.75 mg 3.75 mg       Plan:  1. INR is Supratherapeutic today- see above in Anticoagulation Summary.  Will instruct Dionne Kaiser to Change their warfarin regimen (HOLD today, then resume 3.75 mg daily) - see above in Anticoagulation Summary.  2. Follow up in 1 week  3. Patient declines warfarin refills.  4. Verbal and written information provided. Patient expresses understanding and has no further questions at this time.    Aleksandr Kidd, PharmD

## 2025-07-30 ENCOUNTER — ANTICOAGULATION VISIT (OUTPATIENT)
Dept: PHARMACY | Facility: HOSPITAL | Age: OVER 89
End: 2025-07-30
Payer: MEDICARE

## 2025-07-30 DIAGNOSIS — I48.21 PERMANENT ATRIAL FIBRILLATION: Primary | ICD-10-CM

## 2025-07-30 LAB
INR PPP: 3.2 (ref 0.91–1.09)
PROTHROMBIN TIME: 38.3 SECONDS (ref 10–13.8)

## 2025-07-30 PROCEDURE — G0463 HOSPITAL OUTPT CLINIC VISIT: HCPCS

## 2025-07-30 PROCEDURE — 85610 PROTHROMBIN TIME: CPT

## 2025-07-30 NOTE — PROGRESS NOTES
Anticoagulation Clinic Progress Note    Anticoagulation Summary  As of 7/30/2025      INR goal:  2.0-3.0   TTR:  44.3% (11.6 mo)   INR used for dosing:  3.2 (7/30/2025)   Warfarin maintenance plan:  2.5 mg every Wed; 3.75 mg all other days   Weekly warfarin total:  25 mg   Plan last modified:  Jerilyn Szymanski RPH (7/30/2025)   Next INR check:  8/13/2025   Target end date:  --    Indications    Permanent atrial fibrillation [I48.21]                 Anticoagulation Episode Summary       INR check location:  --    Preferred lab:  --    Send INR reminders to:   [facility]    Comments:  New patient transitioning to warfarin due to high cost of Eliquis.             Anticoagulation Care Providers       Provider Role Specialty Phone number    Troy Verde MD Referring Cardiology [phone]            Clinic Interview:  Patient Findings     Negatives:  Signs/symptoms of thrombosis, Signs/symptoms of bleeding,   Laboratory test error suspected, Change in health, Change in alcohol use,   Change in activity, Upcoming invasive procedure, Emergency department   visit, Upcoming dental procedure, Missed doses, Extra doses, Change in   medications, Change in diet/appetite, Hospital admission, Bruising, Other   complaints      Clinical Outcomes     Negatives:  Major bleeding event, Thromboembolic event,   Anticoagulation-related hospital admission, Anticoagulation-related ED   visit, Anticoagulation-related fatality        INR History:      4/17/2025     9:02 AM 4/25/2025     8:45 AM 5/5/2025     8:30 AM 5/19/2025     8:30 AM 6/25/2025     8:30 AM 7/23/2025     8:30 AM 7/30/2025     8:30 AM   Anticoagulation Monitoring   INR -- 1.6 2.3 2.2 2.4 4.4 3.2   INR Date  4/25/2025 5/5/2025 5/19/2025 6/25/2025 7/23/2025 7/30/2025   INR Goal 2.0-3.0 2.0-3.0 2.0-3.0 2.0-3.0 2.0-3.0 2.0-3.0 2.0-3.0   Trend  Up Same Same Same Same Down   Last Week Total  25 mg 26.25 mg 26.25 mg 26.25 mg 26.25 mg 22.5 mg   Next Week Total   27.5 mg 26.25 mg 26.25 mg 26.25 mg 22.5 mg 25 mg   Sun  3.75 mg 3.75 mg 3.75 mg 3.75 mg 3.75 mg 3.75 mg   Mon  3.75 mg 3.75 mg 3.75 mg 3.75 mg 3.75 mg 3.75 mg   Tue  3.75 mg 3.75 mg 3.75 mg 3.75 mg 3.75 mg 3.75 mg   Wed  3.75 mg 3.75 mg 3.75 mg 3.75 mg Hold (7/23) 2.5 mg   Thu  3.75 mg 3.75 mg 3.75 mg 3.75 mg 3.75 mg 3.75 mg   Fri  5 mg (4/25); Otherwise 3.75 mg 3.75 mg 3.75 mg 3.75 mg 3.75 mg 3.75 mg   Sat  3.75 mg 3.75 mg 3.75 mg 3.75 mg 3.75 mg 3.75 mg       Plan:  1. INR is Supratherapeutic today- see above in Anticoagulation Summary.  Will instruct Dionne Kaiser to Change their warfarin regimen- see above in Anticoagulation Summary.  Reduce to 2.5 mg on wed, 3.75 mg AOD, rck 2 weeks   2. Follow up in 2 weeks  3. Patient declines warfarin refills.  4. Verbal and written information provided. Patient expresses understanding and has no further questions at this time.    Jerilyn Szymanski [facility]

## 2025-08-06 ENCOUNTER — OFFICE VISIT (OUTPATIENT)
Dept: CARDIOLOGY | Age: OVER 89
End: 2025-08-06
Payer: MEDICARE

## 2025-08-06 VITALS
BODY MASS INDEX: 25.71 KG/M2 | SYSTOLIC BLOOD PRESSURE: 114 MMHG | DIASTOLIC BLOOD PRESSURE: 60 MMHG | HEART RATE: 79 BPM | WEIGHT: 150.6 LBS | HEIGHT: 64 IN

## 2025-08-06 DIAGNOSIS — I49.3 PVCS (PREMATURE VENTRICULAR CONTRACTIONS): ICD-10-CM

## 2025-08-06 DIAGNOSIS — I10 PRIMARY HYPERTENSION: ICD-10-CM

## 2025-08-06 DIAGNOSIS — I48.21 PERMANENT ATRIAL FIBRILLATION: Primary | ICD-10-CM

## 2025-08-06 PROCEDURE — 1159F MED LIST DOCD IN RCRD: CPT | Performed by: INTERNAL MEDICINE

## 2025-08-06 PROCEDURE — 1160F RVW MEDS BY RX/DR IN RCRD: CPT | Performed by: INTERNAL MEDICINE

## 2025-08-06 PROCEDURE — 99214 OFFICE O/P EST MOD 30 MIN: CPT | Performed by: INTERNAL MEDICINE

## 2025-08-06 PROCEDURE — 93000 ELECTROCARDIOGRAM COMPLETE: CPT | Performed by: INTERNAL MEDICINE

## 2025-08-06 RX ORDER — ROSUVASTATIN CALCIUM 40 MG/1
1 TABLET, COATED ORAL DAILY
COMMUNITY
Start: 2025-07-09

## 2025-08-13 ENCOUNTER — ANTICOAGULATION VISIT (OUTPATIENT)
Dept: PHARMACY | Facility: HOSPITAL | Age: OVER 89
End: 2025-08-13
Payer: MEDICARE

## 2025-08-13 DIAGNOSIS — I48.21 PERMANENT ATRIAL FIBRILLATION: Primary | ICD-10-CM

## 2025-08-13 LAB
INR PPP: 3.1 (ref 0.91–1.09)
PROTHROMBIN TIME: 36.9 SECONDS (ref 10–13.8)

## 2025-08-13 PROCEDURE — 85610 PROTHROMBIN TIME: CPT

## 2025-08-13 PROCEDURE — G0463 HOSPITAL OUTPT CLINIC VISIT: HCPCS

## 2025-08-13 RX ORDER — WARFARIN SODIUM 2.5 MG/1
TABLET ORAL
Qty: 120 TABLET | Refills: 1 | Status: SHIPPED | OUTPATIENT
Start: 2025-08-13

## 2025-08-27 ENCOUNTER — ANTICOAGULATION VISIT (OUTPATIENT)
Dept: PHARMACY | Facility: HOSPITAL | Age: OVER 89
End: 2025-08-27
Payer: MEDICARE

## 2025-08-27 DIAGNOSIS — I48.21 PERMANENT ATRIAL FIBRILLATION: Primary | ICD-10-CM

## 2025-08-27 LAB
INR PPP: 2.9 (ref 0.91–1.09)
PROTHROMBIN TIME: 34.5 SECONDS (ref 10–13.8)

## 2025-08-27 PROCEDURE — G0463 HOSPITAL OUTPT CLINIC VISIT: HCPCS

## 2025-08-27 PROCEDURE — 85610 PROTHROMBIN TIME: CPT

## 2025-08-27 RX ORDER — WARFARIN SODIUM 2.5 MG/1
TABLET ORAL
Qty: 120 TABLET | Refills: 1 | Status: SHIPPED | OUTPATIENT
Start: 2025-08-27